# Patient Record
Sex: MALE | Race: BLACK OR AFRICAN AMERICAN | NOT HISPANIC OR LATINO | Employment: FULL TIME | URBAN - METROPOLITAN AREA
[De-identification: names, ages, dates, MRNs, and addresses within clinical notes are randomized per-mention and may not be internally consistent; named-entity substitution may affect disease eponyms.]

---

## 2017-03-03 ENCOUNTER — OFFICE VISIT (OUTPATIENT)
Dept: UROLOGY | Facility: CLINIC | Age: 57
End: 2017-03-03
Payer: COMMERCIAL

## 2017-03-03 VITALS
WEIGHT: 273.81 LBS | HEIGHT: 73 IN | BODY MASS INDEX: 36.29 KG/M2 | HEART RATE: 93 BPM | DIASTOLIC BLOOD PRESSURE: 90 MMHG | SYSTOLIC BLOOD PRESSURE: 168 MMHG

## 2017-03-03 DIAGNOSIS — B00.9 HSV-2 INFECTION: ICD-10-CM

## 2017-03-03 DIAGNOSIS — N40.1 BPH WITH URINARY OBSTRUCTION: Primary | ICD-10-CM

## 2017-03-03 DIAGNOSIS — N13.8 BPH WITH URINARY OBSTRUCTION: Primary | ICD-10-CM

## 2017-03-03 DIAGNOSIS — N48.1 BALANITIS: ICD-10-CM

## 2017-03-03 PROCEDURE — 99213 OFFICE O/P EST LOW 20 MIN: CPT | Mod: PBBFAC | Performed by: NURSE PRACTITIONER

## 2017-03-03 PROCEDURE — 99999 PR PBB SHADOW E&M-EST. PATIENT-LVL III: CPT | Mod: PBBFAC,,, | Performed by: NURSE PRACTITIONER

## 2017-03-03 PROCEDURE — 99214 OFFICE O/P EST MOD 30 MIN: CPT | Mod: S$PBB,,, | Performed by: NURSE PRACTITIONER

## 2017-03-03 RX ORDER — VALACYCLOVIR HYDROCHLORIDE 1 G/1
1000 TABLET, FILM COATED ORAL 3 TIMES DAILY
Qty: 21 TABLET | Refills: 1 | Status: SHIPPED | OUTPATIENT
Start: 2017-03-03 | End: 2017-03-03 | Stop reason: SDUPTHER

## 2017-03-03 RX ORDER — NAPROXEN 500 MG/1
TABLET ORAL
Refills: 0 | COMMUNITY
Start: 2017-02-10 | End: 2017-03-03

## 2017-03-03 RX ORDER — CLOTRIMAZOLE AND BETAMETHASONE DIPROPIONATE 10; .64 MG/G; MG/G
CREAM TOPICAL 2 TIMES DAILY
Qty: 15 G | Refills: 2 | Status: SHIPPED | OUTPATIENT
Start: 2017-03-03 | End: 2017-04-02

## 2017-03-03 RX ORDER — NAPROXEN 500 MG/1
500 TABLET ORAL 2 TIMES DAILY
COMMUNITY
End: 2018-06-05 | Stop reason: ALTCHOICE

## 2017-03-03 RX ORDER — TAMSULOSIN HYDROCHLORIDE 0.4 MG/1
0.4 CAPSULE ORAL NIGHTLY
Qty: 30 CAPSULE | Refills: 12 | Status: SHIPPED | OUTPATIENT
Start: 2017-03-03 | End: 2018-06-05 | Stop reason: SDUPTHER

## 2017-03-03 RX ORDER — VALACYCLOVIR HYDROCHLORIDE 1 G/1
1000 TABLET, FILM COATED ORAL 3 TIMES DAILY
Qty: 21 TABLET | Refills: 1
Start: 2017-03-03 | End: 2018-06-05 | Stop reason: SDUPTHER

## 2017-03-03 NOTE — PATIENT INSTRUCTIONS
Herpes  If you have herpes, youre not alone. Millions of Americans have it. Herpes has no cure. But you can control it and learn how to protect yourself and others from outbreaks.  What is herpes?  Herpes is a chronic (lifelong) virus. It can cause sores and discomfort. You get it from contact with someone who carries the virus. If sores occur on the lips, you have oral herpes. If sores occur on the penis or around the vagina, you have genital herpes.  Herpes outbreaks  · The first outbreak of herpes sores is usually the most severe. Then, the soldiers of the bodys immune system, white blood cells, produce antibodies. These antibodies help neutralize the herpes virus and may help make future attacks less severe.  · Some people have only one outbreak of sores. Some people have periods of frequent outbreaks (every few weeks). Outbreaks of herpes sores may occur less frequently over time.  · Herpes sores may appear without a cause. Outbreaks are more likely when the immune system is weak. Other viral infections (such as a cold) can cause outbreaks. Stress from a poor diet, fatigue, or emotional upset can lead to outbreaks of sores.  To help prevent outbreaks  · The best way to prevent sores is to boost your immune system. To do this:  ¨ Get plenty of sleep.  ¨ Reduce stress and tension.  ¨ Eat a balanced diet. Your health care provider may suggest taking supplements to help assure that you get all the nutrients you need.  · To prevent oral herpes outbreaks, avoid overexposure to wind, sun, and extreme temperatures. Use sunscreen and lip balm on affected areas.  · Ask your health care provider about medications that can help prevent outbreaks.  How herpes spreads to others  Herpes can be spread during an outbreak. But even without sores present, you can still shed the virus and infect others. You can take steps to prevent this.  To protect yourself and others  · If you have an oral sore, avoid kissing and  oral-genital contact.  · If you have a genital sore, avoid intercourse. Also avoid oral-genital contact.  · Wash your hands after touching a sore.  · Use a condom each time you have sex. You can pass the virus even when sores arent present. If youre unsure about the timing of certain kinds of physical contact, ask your health care provider.  · Tell any new partners that you have herpes.  · If youre a woman, have Pap tests as often as your healthcare provider recommends.   · In some cases, daily antiviral medication (valavyclovir), in addition to consistent condom use, may reduce your chances of spreading herpes to an uninfected partner. Ask your doctor if this medication would be helpful for you.  Resources  American Social Health Association STD Hotline  546.921.2368  www.ashastd.org  Centers for Disease Control and Prevention  852.132.3624  www.cdc.gov/std   Date Last Reviewed: 10/27/2014  © 6209-0732 [employer]. [address] [place] [address], [place] [address]. All rights reserved. This information is not intended as a substitute for professional medical care. Always follow your healthcare professional's instructions.        Living with Herpes  To speed healing, take care of open herpes sores. To reduce outbreaks, take care of your health. And to keep from infecting others, learn how to avoid spreading the virus.     To ease symptoms  · Start episodic treatment at the first sign of symptoms, such as itching or tingling.  · Take ibuprofen or acetaminophen to limit any pain.  · Sit in a warm or cool bath or use a moist compress to lessen the itching of sores. For some women, genital outbreaks cause burning during urination. In such cases, urinating in a tub of warm water helps reduce burning.  · Wear white cotton underwear and loose clothing during outbreaks. Dont wear nylon underwear or tight clothes. They can prevent sores from healing.     To speed healing  · Wash sores with mild soap and water. Pat  the sores completely dry.  · Always wash your hands after touching a sore.  · Dont bandage sores. Air helps them heal.  · Avoid using any ointment unless it is prescribed. Applying the wrong jelly or cream may hold in moisture and slow healing.  · Dont pick at the sores. This can slow healing, and might cause a sore to become infected.  · If you wear contacts, wash your hands well before putting them in.     To reduce outbreaks  · Eat a balanced diet. Your health care provider may suggest taking supplements. These help ensure that you get all the nutrients you need.  · Get plenty of sleep. This helps your immune system work its best.  · Limit stress and tension. Both can weaken the bodys defenses.  · Limit exposure to sun, wind, and extreme heat or cold. Wear sunscreen and lip balm to help prevent outbreaks.     To protect others  · Tell your current sex partner and any future partners that you have herpes. If you dont know what to say, ask your health care provider for help.  · Use a latex condom that covers the affected areas each time you have sex. This reduces the risk of passing herpes to your partner.  · Avoid kissing when you have an oral sore.  · Do not have intercourse when genital sores are present. Also keep in mind, herpes can be passed during oral sex and with anal contact.  · Dont share towels, toothbrushes, lip balm, or lipstick when you have a sore.  · Some evidence supports taking daily antiviral medications to help reduce the likelihood of transmission to your partner.  Date Last Reviewed: 10/28/2014  © 7501-9076 [employer]. [address] [place] [address], [place] [address]. All rights reserved. This information is not intended as a substitute for professional medical care. Always follow your healthcare professional's instructions.

## 2017-03-03 NOTE — PROGRESS NOTES
Subjective:       Patient ID: Bhupinder Garces is a 56 y.o. male.    Chief Complaint: Benign Prostatic Hypertrophy (follow up per patient -no longer on flomax )    HPI Comments: Bhupinder Garces is a 54 y.o. Male with history of BPH.  He was last seen in clinic 07/15/2015  He has history of back pain, prostatitis, & hematospermia.    Today he is here for annual check.  He no longer taking Flomax.  LUTS include frequency/nocturia 3-5x.    He not having issues with ED, but does complain of itching around head of penis.  He reports history of HSV and believes having a break out.  No lesions, burning; only itching.  urinary frequency/nocturia.                                 PSA                     0.62                02/05/2015                        PSA                      0.40                03/22/2011               Past Medical History:    Prostatitis                                                   No past surgical history on file.    No family history on file.      Social History    Marital Status:              Spouse Name:                       Years of Education:                 Number of children:               Occupational History    None on file    Social History Main Topics    Smoking Status: Never Smoker                      Smokeless Status: Not on file                       Alcohol Use: No              Drug Use: Not on file     Sexual Activity: Not on file          Other Topics            Concern    None on file    Social History Narrative    None on file        Allergies:  Review of patient's allergies indicates no known allergies.    Medications:  Current outpatient prescriptions: oxaprozin (DAYPRO) 600 mg tablet, Take 1 tablet (600 mg total) by mouth every 12 (twelve) hours. Take with food., Disp: 60 tablet, Rfl: 1;  tamsulosin (FLOMAX) 0.4 mg Cp24, TAKE 1 CAPSULE BY MOUTH EVERY DAY, Disp: 90 capsule, Rfl: 11                Review of Systems   Constitutional: Negative.  Negative for activity change,  appetite change and fever.   HENT: Negative.  Negative for facial swelling and trouble swallowing.    Eyes: Negative.    Respiratory: Negative.  Negative for shortness of breath.    Cardiovascular: Negative.  Negative for chest pain and palpitations.   Gastrointestinal: Negative for abdominal pain, constipation, diarrhea, nausea and vomiting.   Genitourinary: Positive for frequency and nocturia. Negative for difficulty urinating, dysuria, enuresis, flank pain, genital sores, hematuria, penile pain, scrotal swelling, testicular pain and urgency.        FOS good;  (+) penis itching.     Musculoskeletal: Negative for back pain, gait problem and neck stiffness.   Skin: Negative.  Negative for wound.   Neurological: Negative for dizziness, tremors, seizures, syncope, speech difficulty, light-headedness and headaches.   Hematological: Does not bruise/bleed easily.   Psychiatric/Behavioral: Negative for confusion and hallucinations. The patient is not nervous/anxious.        Objective:      Physical Exam   Nursing note and vitals reviewed.  Constitutional: He is oriented to person, place, and time. Vital signs are normal. He appears well-developed and well-nourished. He is active and cooperative.  Non-toxic appearance. He does not have a sickly appearance.   Urine dipped clear of infection in the office today.     HENT:   Head: Normocephalic and atraumatic.   Right Ear: External ear normal.   Left Ear: External ear normal.   Nose: Nose normal.   Mouth/Throat: Mucous membranes are normal.   Eyes: Conjunctivae and lids are normal. No scleral icterus.   Neck: Trachea normal, normal range of motion and full passive range of motion without pain. Neck supple. No JVD present. No tracheal deviation present.   Cardiovascular: Normal rate, regular rhythm, S1 normal and S2 normal.    Pulmonary/Chest: Effort normal and breath sounds normal. No respiratory distress. He exhibits no tenderness.   Abdominal: Soft. Normal appearance and  bowel sounds are normal. There is no hepatosplenomegaly. There is no tenderness. There is no rebound, no guarding and no CVA tenderness.   Genitourinary: Rectum normal and testes normal. Rectal exam shows no external hemorrhoid, no mass and no tenderness. Prostate is enlarged. Right testis shows no mass, no swelling and no tenderness. Left testis shows no mass, no swelling and no tenderness. Penile erythema present. No phimosis, paraphimosis, hypospadias or penile tenderness. No discharge found.       Genitourinary Comments: (+) erythema around corona; no lesions/ulcerations noted. hypopigmented areas around distal penis     Musculoskeletal: Normal range of motion.   Neurological: He is alert and oriented to person, place, and time. He has normal strength.   Skin: Skin is warm, dry and intact.     Psychiatric: He has a normal mood and affect. His behavior is normal. Judgment and thought content normal.       Assessment:       1. BPH with urinary obstruction    2. Balanitis    3. HSV-2 infection        Plan:         I spent 25 minutes with the patient of which more than half was spent in direct consultation with the patient in regards to our treatment and plan.    Education and recommendations of today's plan of care including home remedies.  Discussed his LUTS; diet modifications; avoiding bladder irritants.  Restart Flomax; benefits, risks, se discussed  Discussed HSV and typical symptoms with outbreak.  This appears to be more balanitis; trial of Lotrisone cream BID x 7 days.  Rx to hold for Valtrex 1000mg TID x 7days for typical outbreak.  Educational material given.  PSA today.  RTC 3 months to check LUTS

## 2017-03-03 NOTE — MR AVS SNAPSHOT
[facility] - Urology 4th Floor  [address]  [facility] [address]  Phone: [phone]                  Bhupinder Garces   3/3/2017 2:40 PM   Office Visit    Description:  Male : 1960   Provider:  Poly King NP   Department:  [facility] - Urology 4th Floor           Reason for Visit     Benign Prostatic Hypertrophy           Diagnoses this Visit        Comments    BPH with urinary obstruction    -  Primary     Balanitis         HSV-1 infection                To Do List           Goals (5 Years of Data)     None      Follow-Up and Disposition     Return in about 3 months (around 6/3/2017).       These Medications        Disp Refills Start End    tamsulosin (FLOMAX) 0.4 mg Cp24 30 capsule 12 3/3/2017 2017    Take 1 capsule (0.4 mg total) by mouth every evening. - Oral    Pharmacy: [address] [place] [address] [address] [facility] Ph #: [phone]       clotrimazole-betamethasone 1-0.05% (LOTRISONE) cream 15 g 2 3/3/2017 2017    Apply topically 2 (two) times daily. Apply very thin layer to clean affected area twice a day for 7 - 10 days. - Topical (Top)    Pharmacy: [address] [place] [address], [place] - [address] [facility] Ph #: [phone]       valacyclovir (VALTREX) 1000 MG tablet 21 tablet 1 3/3/2017 3/10/2017    Take 1 tablet (1,000 mg total) by mouth 3 (three) times daily. - Oral    Pharmacy: [address] [place] [address] [place] [facility] [address] [facility]: [phone]         [facility]     [facility] On Call Nurse Care Line -  Assistance  Registered nurses in the [facility] provide clinical advisement, health education, appointment booking, and other advisory services.  Call for this free service at [phone].             Medications           Message regarding Medications     Verify the changes and/or additions to your medication  "regime listed below are the same as discussed with your clinician today.  If any of these changes or additions are incorrect, please notify your healthcare provider.        START taking these NEW medications        Refills    tamsulosin (FLOMAX) 0.4 mg Cp24 12    Sig: Take 1 capsule (0.4 mg total) by mouth every evening.    Class: Normal    Route: Oral    clotrimazole-betamethasone 1-0.05% (LOTRISONE) cream 2    Sig: Apply topically 2 (two) times daily. Apply very thin layer to clean affected area twice a day for 7 - 10 days.    Class: Normal    Route: Topical (Top)    valacyclovir (VALTREX) 1000 MG tablet 1    Sig: Take 1 tablet (1,000 mg total) by mouth 3 (three) times daily.    Class: Print    Route: Oral           Verify that the below list of medications is an accurate representation of the medications you are currently taking.  If none reported, the list may be blank. If incorrect, please contact your healthcare provider. Carry this list with you in case of emergency.           Current Medications     naproxen (NAPROSYN) 500 MG tablet Take 500 mg by mouth 2 (two) times daily.    clotrimazole-betamethasone 1-0.05% (LOTRISONE) cream Apply topically 2 (two) times daily. Apply very thin layer to clean affected area twice a day for 7 - 10 days.    tamsulosin (FLOMAX) 0.4 mg Cp24 Take 1 capsule (0.4 mg total) by mouth every evening.    valacyclovir (VALTREX) 1000 MG tablet Take 1 tablet (1,000 mg total) by mouth 3 (three) times daily.           Clinical Reference Information           Your Vitals Were     BP Pulse Height Weight BMI    168/90 93 6' 1" (1.854 m) 124.2 kg (273 lb 13 oz) 36.13 kg/m2      Blood Pressure          Most Recent Value    BP  (!)  168/90      Allergies as of 3/3/2017     No Known Allergies      Immunizations Administered on Date of Encounter - 3/3/2017     None      Orders Placed During Today's Visit      Normal Orders This Visit    POCT urine dipstick without microscope     Future " Labs/Procedures Expected by Expires    Prostate Specific Antigen, Diagnostic  3/3/2017 4/7/2018      [facility] Sign-Up     Activating your [facility] account is as easy as 1-2-3!     1) Visit [email or web address], select Sign Up Now, enter this activation code and your date of birth, then select Next.  [email or web address]  Expires: 4/17/2017  3:48 PM      2) Create a username and password to use when you visit [facility] in the future and select a security question in case you lose your password and select Next.    3) Enter your e-mail address and click Sign Up!    Additional Information  If you have questions, please e-mail [email or web address] or call [phone] to talk to our [facility] staff. Remember, [facility] is NOT to be used for urgent needs. For medical emergencies, dial 911.         Instructions      Herpes  If you have herpes, youre not alone. Millions of Americans have it. Herpes has no cure. But you can control it and learn how to protect yourself and others from outbreaks.  What is herpes?  Herpes is a chronic (lifelong) virus. It can cause sores and discomfort. You get it from contact with someone who carries the virus. If sores occur on the lips, you have oral herpes. If sores occur on the penis or around the vagina, you have genital herpes.  Herpes outbreaks  · The first outbreak of herpes sores is usually the most severe. Then, the soldiers of the bodys immune system, white blood cells, produce antibodies. These antibodies help neutralize the herpes virus and may help make future attacks less severe.  · Some people have only one outbreak of sores. Some people have periods of frequent outbreaks (every few weeks). Outbreaks of herpes sores may occur less frequently over time.  · Herpes sores may appear without a cause. Outbreaks are more likely when the immune system is weak. Other viral infections (such as a cold) can cause outbreaks. Stress from a poor diet, fatigue, or emotional upset can lead to  outbreaks of sores.  To help prevent outbreaks  · The best way to prevent sores is to boost your immune system. To do this:  ¨ Get plenty of sleep.  ¨ Reduce stress and tension.  ¨ Eat a balanced diet. Your health care provider may suggest taking supplements to help assure that you get all the nutrients you need.  · To prevent oral herpes outbreaks, avoid overexposure to wind, sun, and extreme temperatures. Use sunscreen and lip balm on affected areas.  · Ask your health care provider about medications that can help prevent outbreaks.  How herpes spreads to others  Herpes can be spread during an outbreak. But even without sores present, you can still shed the virus and infect others. You can take steps to prevent this.  To protect yourself and others  · If you have an oral sore, avoid kissing and oral-genital contact.  · If you have a genital sore, avoid intercourse. Also avoid oral-genital contact.  · Wash your hands after touching a sore.  · Use a condom each time you have sex. You can pass the virus even when sores arent present. If youre unsure about the timing of certain kinds of physical contact, ask your health care provider.  · Tell any new partners that you have herpes.  · If youre a woman, have Pap tests as often as your healthcare provider recommends.   · In some cases, daily antiviral medication (valavyclovir), in addition to consistent condom use, may reduce your chances of spreading herpes to an uninfected partner. Ask your doctor if this medication would be helpful for you.  Resources  American Social Health Association STD Hotline  148.775.2974  www.ashastd.org  Centers for Disease Control and Prevention  723.501.4024  www.cdc.gov/std   Date Last Reviewed: 10/27/2014  © 7737-8191 [employer]. [address] [place] [address], [place] [address]. All rights reserved. This information is not intended as a substitute for professional medical care. Always follow your healthcare professional's  instructions.        Living with Herpes  To speed healing, take care of open herpes sores. To reduce outbreaks, take care of your health. And to keep from infecting others, learn how to avoid spreading the virus.     To ease symptoms  · Start episodic treatment at the first sign of symptoms, such as itching or tingling.  · Take ibuprofen or acetaminophen to limit any pain.  · Sit in a warm or cool bath or use a moist compress to lessen the itching of sores. For some women, genital outbreaks cause burning during urination. In such cases, urinating in a tub of warm water helps reduce burning.  · Wear white cotton underwear and loose clothing during outbreaks. Dont wear nylon underwear or tight clothes. They can prevent sores from healing.     To speed healing  · Wash sores with mild soap and water. Pat the sores completely dry.  · Always wash your hands after touching a sore.  · Dont bandage sores. Air helps them heal.  · Avoid using any ointment unless it is prescribed. Applying the wrong jelly or cream may hold in moisture and slow healing.  · Dont pick at the sores. This can slow healing, and might cause a sore to become infected.  · If you wear contacts, wash your hands well before putting them in.     To reduce outbreaks  · Eat a balanced diet. Your health care provider may suggest taking supplements. These help ensure that you get all the nutrients you need.  · Get plenty of sleep. This helps your immune system work its best.  · Limit stress and tension. Both can weaken the bodys defenses.  · Limit exposure to sun, wind, and extreme heat or cold. Wear sunscreen and lip balm to help prevent outbreaks.     To protect others  · Tell your current sex partner and any future partners that you have herpes. If you dont know what to say, ask your health care provider for help.  · Use a latex condom that covers the affected areas each time you have sex. This reduces the risk of passing herpes to your  partner.  · Avoid kissing when you have an oral sore.  · Do not have intercourse when genital sores are present. Also keep in mind, herpes can be passed during oral sex and with anal contact.  · Dont share towels, toothbrushes, lip balm, or lipstick when you have a sore.  · Some evidence supports taking daily antiviral medications to help reduce the likelihood of transmission to your partner.  Date Last Reviewed: 10/28/2014  © 3145-6993 [employer]. [address] [place] [address], [place] [address]. All rights reserved. This information is not intended as a substitute for professional medical care. Always follow your healthcare professional's instructions.             Language Assistance Services     ATTENTION: Language assistance services are available, free of charge. Please call [phone].      ATENCIÓN: Si marthala hossein, tiene a moreland disposición servicios gratuitos de asistencia lingüística. Llame al [phone].     CHÚ Ý: N?u b?n nói Ti?ng Vi?t, có các d?ch v? h? tr? ngôn ng? mi?n phí dành cho b?n. G?i s? [phone].         [facility] 4th [facility] complies with applicable Federal civil rights laws and does not discriminate on the basis of race, color, national origin, age, disability, or sex.

## 2018-06-05 ENCOUNTER — LAB VISIT (OUTPATIENT)
Dept: LAB | Facility: HOSPITAL | Age: 58
End: 2018-06-05

## 2018-06-05 ENCOUNTER — OFFICE VISIT (OUTPATIENT)
Dept: UROLOGY | Facility: CLINIC | Age: 58
End: 2018-06-05
Payer: COMMERCIAL

## 2018-06-05 VITALS
SYSTOLIC BLOOD PRESSURE: 166 MMHG | BODY MASS INDEX: 34.71 KG/M2 | DIASTOLIC BLOOD PRESSURE: 87 MMHG | HEART RATE: 94 BPM | WEIGHT: 261.94 LBS | HEIGHT: 73 IN

## 2018-06-05 DIAGNOSIS — B00.9 HSV-2 INFECTION: ICD-10-CM

## 2018-06-05 DIAGNOSIS — B00.1 RECURRENT COLD SORES: ICD-10-CM

## 2018-06-05 DIAGNOSIS — N40.1 BPH WITH URINARY OBSTRUCTION: ICD-10-CM

## 2018-06-05 DIAGNOSIS — L29.3 ITCHING OF PENIS: ICD-10-CM

## 2018-06-05 DIAGNOSIS — N13.8 BPH WITH URINARY OBSTRUCTION: Primary | ICD-10-CM

## 2018-06-05 DIAGNOSIS — N13.8 BPH WITH URINARY OBSTRUCTION: ICD-10-CM

## 2018-06-05 DIAGNOSIS — N40.1 BPH WITH URINARY OBSTRUCTION: Primary | ICD-10-CM

## 2018-06-05 LAB
ALBUMIN SERPL BCP-MCNC: 3.9 G/DL
ALP SERPL-CCNC: 66 U/L
ALT SERPL W/O P-5'-P-CCNC: 24 U/L
AST SERPL-CCNC: 36 U/L
BASOPHILS # BLD AUTO: 0.03 K/UL
BASOPHILS NFR BLD: 0.5 %
BILIRUB DIRECT SERPL-MCNC: 0.2 MG/DL
BILIRUB SERPL-MCNC: 0.6 MG/DL
COMPLEXED PSA SERPL-MCNC: 0.72 NG/ML
CREAT SERPL-MCNC: 1.4 MG/DL
DIFFERENTIAL METHOD: ABNORMAL
EOSINOPHIL # BLD AUTO: 0.1 K/UL
EOSINOPHIL NFR BLD: 1.3 %
ERYTHROCYTE [DISTWIDTH] IN BLOOD BY AUTOMATED COUNT: 12.8 %
EST. GFR  (AFRICAN AMERICAN): >60 ML/MIN/1.73 M^2
EST. GFR  (NON AFRICAN AMERICAN): 55 ML/MIN/1.73 M^2
HCT VFR BLD AUTO: 40.1 %
HGB BLD-MCNC: 13.1 G/DL
IMM GRANULOCYTES # BLD AUTO: 0.02 K/UL
IMM GRANULOCYTES NFR BLD AUTO: 0.3 %
LYMPHOCYTES # BLD AUTO: 2.4 K/UL
LYMPHOCYTES NFR BLD: 38.6 %
MCH RBC QN AUTO: 28.6 PG
MCHC RBC AUTO-ENTMCNC: 32.7 G/DL
MCV RBC AUTO: 88 FL
MONOCYTES # BLD AUTO: 0.5 K/UL
MONOCYTES NFR BLD: 8.1 %
NEUTROPHILS # BLD AUTO: 3.2 K/UL
NEUTROPHILS NFR BLD: 51.2 %
NRBC BLD-RTO: 0 /100 WBC
PLATELET # BLD AUTO: 278 K/UL
PMV BLD AUTO: 9.8 FL
PROT SERPL-MCNC: 7.5 G/DL
RBC # BLD AUTO: 4.58 M/UL
WBC # BLD AUTO: 6.16 K/UL

## 2018-06-05 PROCEDURE — 3008F BODY MASS INDEX DOCD: CPT | Mod: CPTII,S$GLB,, | Performed by: NURSE PRACTITIONER

## 2018-06-05 PROCEDURE — 84153 ASSAY OF PSA TOTAL: CPT

## 2018-06-05 PROCEDURE — 86696 HERPES SIMPLEX TYPE 2 TEST: CPT

## 2018-06-05 PROCEDURE — 99214 OFFICE O/P EST MOD 30 MIN: CPT | Mod: S$GLB,,, | Performed by: NURSE PRACTITIONER

## 2018-06-05 PROCEDURE — 80076 HEPATIC FUNCTION PANEL: CPT

## 2018-06-05 PROCEDURE — 85025 COMPLETE CBC W/AUTO DIFF WBC: CPT

## 2018-06-05 PROCEDURE — 82565 ASSAY OF CREATININE: CPT

## 2018-06-05 PROCEDURE — 36415 COLL VENOUS BLD VENIPUNCTURE: CPT

## 2018-06-05 PROCEDURE — 99999 PR PBB SHADOW E&M-EST. PATIENT-LVL III: CPT | Mod: PBBFAC,,, | Performed by: NURSE PRACTITIONER

## 2018-06-05 RX ORDER — TAMSULOSIN HYDROCHLORIDE 0.4 MG/1
0.4 CAPSULE ORAL NIGHTLY
Qty: 90 CAPSULE | Refills: 3 | Status: SHIPPED | OUTPATIENT
Start: 2018-06-05 | End: 2020-01-08 | Stop reason: SDUPTHER

## 2018-06-05 RX ORDER — VALACYCLOVIR HYDROCHLORIDE 500 MG/1
500 TABLET, FILM COATED ORAL DAILY
Qty: 90 TABLET | Refills: 3 | Status: SHIPPED | OUTPATIENT
Start: 2018-06-12 | End: 2020-01-08 | Stop reason: SDUPTHER

## 2018-06-05 RX ORDER — CLOTRIMAZOLE AND BETAMETHASONE DIPROPIONATE 10; .64 MG/G; MG/G
CREAM TOPICAL 2 TIMES DAILY
Qty: 15 G | Refills: 2 | Status: SHIPPED | OUTPATIENT
Start: 2018-06-05 | End: 2019-11-25 | Stop reason: SDUPTHER

## 2018-06-05 RX ORDER — VALACYCLOVIR HYDROCHLORIDE 1 G/1
1000 TABLET, FILM COATED ORAL 3 TIMES DAILY
Qty: 21 TABLET | Refills: 1 | Status: SHIPPED | OUTPATIENT
Start: 2018-06-05 | End: 2018-06-12

## 2018-06-05 NOTE — PATIENT INSTRUCTIONS
BPH (Enlarged Prostate)  The prostate is a gland at the base of the bladder. As some men get older, the prostate may get bigger in size. This problem is called benign prostatic hyperplasia (BPH). BPH puts pressure on the urethra. This is the tube that carries urine from the bladder to the penis. It may interfere with the flow of urine. It may also keep the bladder from emptying fully.    Symptoms of BPH include trouble starting urination and feeling as though the bladder isnt emptying all the way. It also includes a weak urine stream, dribbling and leaking of urine, and frequent and urgent urination (especially at night). BPH can increase the risk of urinary infections. It can also block off urine flow completely. If this occurs, a thin tube (catheter) may be passed into the bladder to help drain urine.  If symptoms are mild, no treatment may be needed right now. If symptoms are more severe, treatment is likely needed. The goal of treatment is to improve urine flow and reduce symptoms. Treatments can include medicine and procedures. Your healthcare provider will discuss treatment options with you as needed.  Home care  The following guidelines will help you care for yourself at home:  · Urinate as soon as you feel the urge. Don't try to hold your urine.  · Don't limit your fluid intake during the day. Drink 6 to 8 glasses of water or liquids a day. This prevents bacteria from building up in the bladder.  · Avoid drinking fluids after dinner to help reduce urination during the night.  · Avoid medicines that can worsen your symptoms. These include certain cold and allergy medicines and antidepressants. Diuretics used for high blood pressure can also worsen symptoms. Talk to your doctor about the medicines you take. Other choices may work better for you.  Prostate cancer screening  BPH does not increase the risk of prostate cancer. But because prostate cancer is a common cancer in men, screening is sometimes  recommended. This may help detect the cancer in its early stages when treatment is most effective. Factors that can increase the risk of prostate cancer include being -American or having a father or brother who had prostate cancer. A high-fat diet may also increase the risk of prostate cancer. Talk to your healthcare provider to see whether you should be screened for prostate cancer.  Follow-up care  Follow up with your healthcare provider, or as advised  To learn more, go to:  · National Kidney & Urologic Diseases Information Clearinghouse  kidney.niddk.nih.gov, 645.537.6889  When to seek medical advice  Call your healthcare provider right away if any of these occur:  · Fever of 100.4°F (38.0°C) or higher, or as advised  · Unable to pass urine for 8 hours  · Increasing pressure or pain in your bladder (lower abdomen)  · Blood in the urine  · Increasing low back pain, not related to injury  · Symptoms of urinary infection (increased urge to urinate, burning when passing urine, foul-smelling urine)  Date Last Reviewed: 7/1/2016  © 2886-3680 [employer]. [address] [place] [address]. All rights reserved. This information is not intended as a substitute for professional medical care. Always follow your healthcare professional's instructions.        Understanding Cold Sores  Cold sores are small blisters or sores on the lip or sometimes inside the mouth. Many people get them from time to time. Cold sores usually are not serious, and they usually heal in a week or two. They are caused by 2 related viruses, herpes simplex type 1 and 2. These viruses spread very easily. Many people have one or both of these viruses in their body. More than 4 in every 5 people are infected with herpes simplex type 1. Once you have the virus that causes cold sores, it stays in your body for the rest of your life. But it can be inactive for long periods.  What causes a cold sore?  Cold sores are usually  caused by herpes simplex virus type 1. Less often, they are caused by herpes simplex virus type 2. Herpes simplex virus type 2 is the more common cause of genital sores. The herpes viruses can enter the body through a break in the skin such as a scrape. Or they may enter through mucous membranes such as the lips or mouth. Some ways to get the viruses include:  · Kissing someone who has a cold sore  · Sharing a drinking glass, eating utensils, or lip balm with someone who has a cold sore  · Having oral sex with someone who has a cold sore  A  baby can also get the infection at birth.  If you have a herpes virus, you can to pass it along even when you dont have a sore.  Cold sores flare up occasionally. Things that can cause an outbreak include:  · Sun exposure  · Fever  · Stress or exhaustion  · Menstruation  · Skin irritation  · Another unrelated Illness such as pneumonia, urinary infection, or cancer  What are the symptoms of a cold sore?  Symptoms can include:  · A blister-like sore or cluster of sores. These often occur at the edge of the lips but may appear inside the mouth.  · Skin redness around the sores.  · Pain or itching in the area of the outbreak. Often the pain or itching develops 12 to 24 hours before the sore become visible.  · Flu-like symptoms, including swollen glands, headache, body ache, or fever. These typically occur only at the time of the first infection.  Cold sores may also occur on fingers. They may rarely infect the eyes, a serious possible complication.  Some people have symptoms a day or two before an outbreak. They may feel tenderness, burning, itching, or tingling before a cold sore appears. Cold sores tend to come back in the same area that they first appeared.  How are cold sores treated?  Treatment for cold sores focuses on relieving and shortening symptoms. For people with frequent outbreaks, treatment works to decrease how often future episodes.  Treatments may  include:  · Prescription or over-the-counter pain medicines. These can help with discomfort, especially if sores are inside the mouth.  · Antiviral medicines. These may be pills that are taken by mouth or a cream to apply to sores. They may help shorten an outbreak and reduce the severity of symptoms.  They may be used to help prevent future outbreaks if you have disabling recurrent infections.  · Self-care such as extra rest and drinking more fluids. These may help relieve the flu-like symptoms of a first outbreak.  How are cold sores diagnosed?  Your healthcare provider makes the diagnosis mainly by looking at the sores and doing a clinical exam.  This may be confirmed by swab tests or blood tests.  How can I prevent cold sores?  You can help reduce the spread of the herpes viruses that cause cold sores. This can help both you and others avoid getting cold sores. Follow these tips:  · Do not kiss others if you have a cold sore. Also avoid kissing someone with a cold sore.  · Do not share eating utensils, lip balm, razors, or towels with someone who has a cold sore.  · Wash your hands after touching the area of a cold sore. The herpes virus can be carried from your face to your hands when you touch the area of a cold sore. When this happens, wash your hands thoroughly, for at least 20 seconds. When you cant wash with soap and water, use an alcohol-based hand .  · Disinfect things you touch often, such as phones and keyboards.    · If you feel a cold sore coming on, do the same things you would do when a cold sore is present to avoid spreading the virus.  · Use condoms to help prevent passing on the viruses through sex.  What are the possible complications of a cold sore?  Cold sores usually go away by themselves within 2 weeks. For most people cold sores are not serious. The viruses that cause cold sores can cause more serious illness, though. People who have a weak immune system may get more serious  infections from herpes viruses. These include people being treated for cancer or who have HIV disease. Babies may also become very ill from a herpes infection.      When should I call my healthcare provider?  Call your healthcare provider right away if you have any of these:  · Fever of 100.4°F (38°C) or higher, or as directed  · Pain that gets worse  · You cannot eat or drink because of painful sores  · Symptoms dont get better within 5 to 7 days  · Blisters spread beyond the mouth or lip to areas on the chest, arms, face, or legs   Date Last Reviewed: 3/28/2016  © 9727-7798 [employer]. [address] [place] [id number]. All rights reserved. This information is not intended as a substitute for professional medical care. Always follow your healthcare professional's instructions.        The Herpes Virus  Herpes is a virus that can cause sores on the skin. There are 2 types of the virus. Depending on how you come in contact with the virus, either type can cause outbreaks near the mouth or on the sex organs.  Understanding the herpes virus  Herpes reproduces only when it is inside the body. It does so by tricking a healthy cell into producing copies of the herpes virus. Each copy can infect nearby cells. But, before too long, the bodys defenses rally to stop the attack. The immune system forces the virus to retreat. Even then, the virus stays inside the body but does not cause disease. For some people, an acute outbreak never happens again. For others, outbreaks are more likely to occur due to menstruation, illness, poor diet, fatigue, exposure to cold or strong sunlight, or stress.    How the herpes virus attacks  1. The herpes virus enters the body through a small break in the skin. The virus can also enter by direct contact with mucous membranes, such as those of the lips, vagina, or anus.  2. Inside the body, the herpes virus binds to a special site on a skin cell. Then part of the virus  moves into the cell.  3. Inside the skin cell, the virus releases a set of instructions. These commands cause the cell to begin making copies of the herpes virus.  4. Herpes blisters appear on the skin. Herpes blisters may also appear on mucous membranes lining the mouth, vagina, or anus.  Date Last Reviewed: 1/1/2017  © 6530-2394 [employer]. [address] [place] [address], [place] [address]. All rights reserved. This information is not intended as a substitute for professional medical care. Always follow your healthcare professional's instructions.        Living with Herpes  To speed healing, take care of open herpes sores. To reduce outbreaks, take care of your health. And to keep from infecting others, learn how to avoid spreading the virus.     To ease symptoms  · Start episodic treatment at the first sign of symptoms, such as itching or tingling.  · Take ibuprofen or acetaminophen to limit any pain.  · Sit in a warm or cool bath or use a moist compress to lessen the itching of sores. For some women, genital outbreaks cause burning during urination. In such cases, urinating in a tub of warm water helps reduce burning.  · Wear white cotton underwear and loose clothing during outbreaks. Dont wear nylon underwear or tight clothes. They can prevent sores from healing.     To speed healing  · Wash sores with mild soap and water. Pat (don't rub) the sores completely dry.  · Always wash your hands after touching a sore.  · Dont bandage sores. Air helps them heal.  · Avoid using any ointment unless it is prescribed. Applying the wrong jelly or cream may hold in moisture and slow healing.  · Dont pick at the sores. This can slow healing, and might cause a sore to become infected.  · If you wear contacts, wash your hands well before putting them in.     To reduce outbreaks  · Eat a balanced diet. Your health care provider may suggest taking supplements. These help ensure that you get all the nutrients you  need.  · Get plenty of sleep. This helps your immune system work its best.  · Limit stress and tension. Both can weaken the bodys defenses.  · Limit exposure to sun, wind, and extreme heat or cold. Wear sunscreen and lip balm to help prevent outbreaks.     To protect others  · Tell your current sex partner and any future partners that you have herpes. If you dont know what to say, ask your healthcare provider for help.  · Use a latex condom that covers the affected areas each time you have sex. This reduces the risk of passing herpes to your partner.  · Avoid kissing when you have an oral sore.  · Do not have intercourse when genital sores are present. Also keep in mind, herpes can be passed during oral sex and with anal contact.  · Dont share towels, toothbrushes, lip balm, or lipstick when you have a sore.  · If you have very frequent outbreaks, taking daily antiviral medicines can help reduce the likelihood of transmission to your partner.  Date Last Reviewed: 1/1/2017 © 2000-2017 [employer]. [address] [place] [address]. All rights reserved. This information is not intended as a substitute for professional medical care. Always follow your healthcare professional's instructions.        Living with Herpes  To speed healing, take care of open herpes sores. To reduce outbreaks, take care of your health. And to keep from infecting others, learn how to avoid spreading the virus.     To ease symptoms  · Start episodic treatment at the first sign of symptoms, such as itching or tingling.  · Take ibuprofen or acetaminophen to limit any pain.  · Sit in a warm or cool bath or use a moist compress to lessen the itching of sores. For some women, genital outbreaks cause burning during urination. In such cases, urinating in a tub of warm water helps reduce burning.  · Wear white cotton underwear and loose clothing during outbreaks. Dont wear nylon underwear or tight clothes. They can prevent  sores from healing.     To speed healing  · Wash sores with mild soap and water. Pat (don't rub) the sores completely dry.  · Always wash your hands after touching a sore.  · Dont bandage sores. Air helps them heal.  · Avoid using any ointment unless it is prescribed. Applying the wrong jelly or cream may hold in moisture and slow healing.  · Dont pick at the sores. This can slow healing, and might cause a sore to become infected.  · If you wear contacts, wash your hands well before putting them in.     To reduce outbreaks  · Eat a balanced diet. Your health care provider may suggest taking supplements. These help ensure that you get all the nutrients you need.  · Get plenty of sleep. This helps your immune system work its best.  · Limit stress and tension. Both can weaken the bodys defenses.  · Limit exposure to sun, wind, and extreme heat or cold. Wear sunscreen and lip balm to help prevent outbreaks.     To protect others  · Tell your current sex partner and any future partners that you have herpes. If you dont know what to say, ask your healthcare provider for help.  · Use a latex condom that covers the affected areas each time you have sex. This reduces the risk of passing herpes to your partner.  · Avoid kissing when you have an oral sore.  · Do not have intercourse when genital sores are present. Also keep in mind, herpes can be passed during oral sex and with anal contact.  · Dont share towels, toothbrushes, lip balm, or lipstick when you have a sore.  · If you have very frequent outbreaks, taking daily antiviral medicines can help reduce the likelihood of transmission to your partner.  Date Last Reviewed: 1/1/2017  © 4302-8782 [employer]. [address] [place] [address], [place] [address]. All rights reserved. This information is not intended as a substitute for professional medical care. Always follow your healthcare professional's instructions.

## 2018-06-05 NOTE — PROGRESS NOTES
Subjective:       Patient ID: Bhupinder Garces is a 57 y.o. male.    Chief Complaint: Benign Prostatic Hypertrophy    Bhupinder Garces is a 57 y.o. Male with history of BPH.  He has history of back pain, prostatitis, & hematospermia.  He was last seen in clinic 03/03/2017  He has been taking Flomax for his LUTS.    Today he is here for annual check.  He taking Flomax;   Pleased with how he urinates;   No issues with ED.                PSA                  0.56                03/03/2017                 PSA                  0.62                02/05/2015                   PSA                  0.40                03/22/2011                 PSA                  0.4                 10/01/2007                 PSA                  0.4                 12/06/2005                 PSA                  0.3                 04/12/2004                               Past Medical History:    Prostatitis                                                   No past surgical history on file.    No family history on file.      Social History    Marital Status:              Spouse Name:                       Years of Education:                 Number of children:               Occupational History    None on file    Social History Main Topics    Smoking Status: Never Smoker                      Smokeless Status: Not on file                       Alcohol Use: No              Drug Use: Not on file     Sexual Activity: Not on file          Other Topics            Concern    None on file    Social History Narrative    None on file        Allergies:  Review of patient's allergies indicates no known allergies.    Medications:  Current outpatient prescriptions: oxaprozin (DAYPRO) 600 mg tablet, Take 1 tablet (600 mg total) by mouth every 12 (twelve) hours. Take with food., Disp: 60 tablet, Rfl: 1;  tamsulosin (FLOMAX) 0.4 mg Cp24, TAKE 1 CAPSULE BY MOUTH EVERY DAY, Disp: 90 capsule, Rfl: 11                  Review of Systems   Constitutional:  Negative for activity change, appetite change, chills and fever.   HENT: Negative for facial swelling and trouble swallowing.    Eyes: Negative for visual disturbance.   Respiratory: Negative for chest tightness and shortness of breath.    Cardiovascular: Negative for chest pain and palpitations.   Gastrointestinal: Negative.  Negative for abdominal pain, constipation, diarrhea, nausea and vomiting.   Genitourinary: Negative for difficulty urinating, dysuria, flank pain, hematuria, penile pain, penile swelling, scrotal swelling and testicular pain.        Ok with how he urinates.  No issues with ED.  Itching around head of penis     Musculoskeletal: Negative for back pain, gait problem, myalgias and neck stiffness.   Skin: Positive for rash.        Itching on penis  Penile itching.     Neurological: Negative for dizziness and speech difficulty.   Hematological: Does not bruise/bleed easily.   Psychiatric/Behavioral: Negative for behavioral problems.       Objective:      Physical Exam   Nursing note and vitals reviewed.  Constitutional: He is oriented to person, place, and time. Vital signs are normal. He appears well-developed and well-nourished. He is active and cooperative.  Non-toxic appearance. He does not have a sickly appearance.   Urine dipped clear of infection in the office today.     HENT:   Head: Normocephalic and atraumatic.   Right Ear: External ear normal.   Left Ear: External ear normal.   Nose: Nose normal.   Mouth/Throat: Mucous membranes are normal.   Eyes: Conjunctivae and lids are normal. No scleral icterus.   Neck: Trachea normal, normal range of motion and full passive range of motion without pain. Neck supple. No JVD present. No tracheal deviation present.   Cardiovascular: Normal rate, S1 normal and S2 normal.    Pulmonary/Chest: Effort normal. No respiratory distress. He exhibits no tenderness.   Abdominal: Soft. Normal appearance and bowel sounds are normal. There is no hepatosplenomegaly.  There is no tenderness. There is no CVA tenderness.   Genitourinary: Testes normal. Prostate is enlarged. Right testis shows no swelling and no tenderness. Left testis shows no swelling and no tenderness. Penile erythema and penile tenderness present. No hypospadias. No discharge found.   Genitourinary Comments: Few erythematous vessicle noted on ventral area of corona.   Hyperpigmentation noted.     Musculoskeletal: Normal range of motion.   Neurological: He is alert and oriented to person, place, and time. He has normal strength.   Skin: Skin is warm, dry and intact.     Psychiatric: He has a normal mood and affect. His behavior is normal. Judgment and thought content normal.       Assessment:       1. BPH with urinary obstruction    2. HSV-2 infection    3. Recurrent cold sores    4. Itching of penis        Plan:         I spent 25 minutes with the patient of which more than half was spent in direct consultation with the patient in regards to our treatment and plan.    Education and recommendations of today's plan of care including home remedies.  We discussed his BPH and LUTS; discussed the contributory factors.  Refilled Flomax.  We discussed HSV; recurrence; transmission and triggers.  Initially take Valtrex 1000mg TIDx 7 days then in one week Valtrex 500mg daily for prevention.  Lotrisone cream for area of itching  Today, get PSA, Cr cbc, hsv, lft  RTC one year with new labs

## 2018-06-08 LAB
HSV1 IGG SERPL QL IA: POSITIVE
HSV2 IGG SERPL QL IA: POSITIVE

## 2019-04-12 ENCOUNTER — OFFICE VISIT (OUTPATIENT)
Dept: SURGERY | Facility: CLINIC | Age: 59
End: 2019-04-12
Payer: MEDICAID

## 2019-04-12 VITALS
SYSTOLIC BLOOD PRESSURE: 146 MMHG | WEIGHT: 265.88 LBS | BODY MASS INDEX: 35.24 KG/M2 | HEIGHT: 73 IN | HEART RATE: 98 BPM | DIASTOLIC BLOOD PRESSURE: 85 MMHG

## 2019-04-12 DIAGNOSIS — Z12.11 SCREENING FOR COLON CANCER: ICD-10-CM

## 2019-04-12 DIAGNOSIS — K64.8 INTERNAL HEMORRHOID, BLEEDING: Primary | ICD-10-CM

## 2019-04-12 PROCEDURE — 99213 OFFICE O/P EST LOW 20 MIN: CPT | Mod: PBBFAC | Performed by: NURSE PRACTITIONER

## 2019-04-12 PROCEDURE — 99999 PR PBB SHADOW E&M-EST. PATIENT-LVL III: CPT | Mod: PBBFAC,,, | Performed by: NURSE PRACTITIONER

## 2019-04-12 PROCEDURE — 99999 PR PBB SHADOW E&M-EST. PATIENT-LVL III: ICD-10-PCS | Mod: PBBFAC,,, | Performed by: NURSE PRACTITIONER

## 2019-04-12 PROCEDURE — 99204 PR OFFICE/OUTPT VISIT, NEW, LEVL IV, 45-59 MIN: ICD-10-PCS | Mod: S$PBB,,, | Performed by: NURSE PRACTITIONER

## 2019-04-12 PROCEDURE — 99204 OFFICE O/P NEW MOD 45 MIN: CPT | Mod: S$PBB,,, | Performed by: NURSE PRACTITIONER

## 2019-04-12 NOTE — PROGRESS NOTES
Subjective:       Patient ID: Bhupinder Garces is a 58 y.o. male.    Chief Complaint: Rectal Bleeding    HPI     58 M who presents to clinic for rectal bleeding with bowel movements for the past week. BRB with all BMs. Has a hard, straining bowel movement daily. Denies nay abdominal pain, rectal pain, change in bowel habits, unexplained weight loss. He is not on any anticoagulation.     Colonoscopy: Over 15 years ago per patient  No family history of colon or rectal cancer     Review of Systems   Constitutional: Negative for appetite change, chills, fatigue, fever and unexpected weight change.   Respiratory: Negative for shortness of breath.    Cardiovascular: Negative for chest pain.   Gastrointestinal: Positive for blood in stool. Negative for abdominal distention, abdominal pain, anal bleeding, constipation, diarrhea, nausea, rectal pain and vomiting.       Objective:      Physical Exam   Constitutional: He is oriented to person, place, and time. He appears well-developed and well-nourished.   Eyes: Conjunctivae and EOM are normal.   Pulmonary/Chest: Effort normal. No respiratory distress.   Abdominal: Soft. He exhibits no distension. There is no tenderness.   Genitourinary: Rectal exam shows no mass and no tenderness.   Genitourinary Comments: Normal perianal skin. eversion of anus revealed no abnormality or fissure, MARGO revealed no masses, blood or stool in vault, normal sphincter tone, anoscopy revealed grade IIinternal hemorrhoids with no bleeding or stigmata of same.     Musculoskeletal: Normal range of motion.   Neurological: He is alert and oriented to person, place, and time.   Skin: Skin is warm and dry.   Psychiatric: He has a normal mood and affect. His behavior is normal.       Assessment:       1. Internal hemorrhoid, bleeding    2. Screening for colon cancer        Plan:       High fiber diet  Fiber supplement  Avoid straining or sitting on the toilet for prolonged time periods  Schedule c scope  (possible RBL)  rtc prn

## 2019-04-16 DIAGNOSIS — Z12.11 SPECIAL SCREENING FOR MALIGNANT NEOPLASMS, COLON: Primary | ICD-10-CM

## 2019-04-16 RX ORDER — POLYETHYLENE GLYCOL 3350, SODIUM SULFATE ANHYDROUS, SODIUM BICARBONATE, SODIUM CHLORIDE, POTASSIUM CHLORIDE 236; 22.74; 6.74; 5.86; 2.97 G/4L; G/4L; G/4L; G/4L; G/4L
4 POWDER, FOR SOLUTION ORAL ONCE
Qty: 4000 ML | Refills: 0 | Status: SHIPPED | OUTPATIENT
Start: 2019-04-16 | End: 2019-04-16

## 2019-05-06 ENCOUNTER — ANESTHESIA EVENT (OUTPATIENT)
Dept: ENDOSCOPY | Facility: HOSPITAL | Age: 59
End: 2019-05-06
Payer: MEDICAID

## 2019-05-07 ENCOUNTER — ANESTHESIA (OUTPATIENT)
Dept: ENDOSCOPY | Facility: HOSPITAL | Age: 59
End: 2019-05-07
Payer: MEDICAID

## 2019-05-07 ENCOUNTER — HOSPITAL ENCOUNTER (OUTPATIENT)
Facility: HOSPITAL | Age: 59
Discharge: HOME | End: 2019-05-07
Attending: COLON & RECTAL SURGERY | Admitting: COLON & RECTAL SURGERY
Payer: MEDICAID

## 2019-05-07 VITALS
DIASTOLIC BLOOD PRESSURE: 89 MMHG | SYSTOLIC BLOOD PRESSURE: 148 MMHG | HEIGHT: 73 IN | TEMPERATURE: 98 F | BODY MASS INDEX: 35.12 KG/M2 | HEART RATE: 70 BPM | OXYGEN SATURATION: 96 % | WEIGHT: 265 LBS | RESPIRATION RATE: 16 BRPM

## 2019-05-07 DIAGNOSIS — Z12.11 SCREENING FOR COLON CANCER: Primary | ICD-10-CM

## 2019-05-07 PROCEDURE — E9220 PRA ENDO ANESTHESIA: HCPCS | Mod: ,,, | Performed by: NURSE ANESTHETIST, CERTIFIED REGISTERED

## 2019-05-07 PROCEDURE — 00811 ANES LWR INTST NDSC NOS: CPT | Performed by: COLON & RECTAL SURGERY

## 2019-05-07 PROCEDURE — 63600175 PHARM REV CODE 636 W HCPCS: Performed by: NURSE ANESTHETIST, CERTIFIED REGISTERED

## 2019-05-07 PROCEDURE — 27201012 HC FORCEPS, HOT/COLD, DISP: Performed by: COLON & RECTAL SURGERY

## 2019-05-07 PROCEDURE — 37000009 HC ANESTHESIA EA ADD 15 MINS: Performed by: COLON & RECTAL SURGERY

## 2019-05-07 PROCEDURE — 45380 PR COLONOSCOPY,BIOPSY: ICD-10-PCS | Mod: ,,, | Performed by: COLON & RECTAL SURGERY

## 2019-05-07 PROCEDURE — 25000003 PHARM REV CODE 250: Performed by: NURSE PRACTITIONER

## 2019-05-07 PROCEDURE — E9220 PRA ENDO ANESTHESIA: ICD-10-PCS | Mod: ,,, | Performed by: NURSE ANESTHETIST, CERTIFIED REGISTERED

## 2019-05-07 PROCEDURE — 37000008 HC ANESTHESIA 1ST 15 MINUTES: Performed by: COLON & RECTAL SURGERY

## 2019-05-07 PROCEDURE — 45380 COLONOSCOPY AND BIOPSY: CPT | Performed by: COLON & RECTAL SURGERY

## 2019-05-07 PROCEDURE — 88305 TISSUE SPECIMEN TO PATHOLOGY - SURGERY: ICD-10-PCS | Mod: 26,,, | Performed by: PATHOLOGY

## 2019-05-07 PROCEDURE — 45380 COLONOSCOPY AND BIOPSY: CPT | Mod: ,,, | Performed by: COLON & RECTAL SURGERY

## 2019-05-07 PROCEDURE — 88305 TISSUE EXAM BY PATHOLOGIST: CPT | Performed by: PATHOLOGY

## 2019-05-07 PROCEDURE — 88305 TISSUE EXAM BY PATHOLOGIST: CPT | Mod: 26,,, | Performed by: PATHOLOGY

## 2019-05-07 RX ORDER — SODIUM CHLORIDE 0.9 % (FLUSH) 0.9 %
10 SYRINGE (ML) INJECTION
Status: DISCONTINUED | OUTPATIENT
Start: 2019-05-07 | End: 2020-01-08

## 2019-05-07 RX ORDER — SODIUM CHLORIDE 9 MG/ML
INJECTION, SOLUTION INTRAVENOUS CONTINUOUS
Status: DISCONTINUED | OUTPATIENT
Start: 2019-05-07 | End: 2020-01-08

## 2019-05-07 RX ORDER — LIDOCAINE HCL/PF 100 MG/5ML
SYRINGE (ML) INTRAVENOUS
Status: DISCONTINUED | OUTPATIENT
Start: 2019-05-07 | End: 2019-05-07

## 2019-05-07 RX ORDER — PROPOFOL 10 MG/ML
VIAL (ML) INTRAVENOUS
Status: DISCONTINUED | OUTPATIENT
Start: 2019-05-07 | End: 2019-05-07

## 2019-05-07 RX ADMIN — PROPOFOL 50 MG: 10 INJECTION, EMULSION INTRAVENOUS at 12:05

## 2019-05-07 RX ADMIN — LIDOCAINE HYDROCHLORIDE 100 MG: 20 INJECTION, SOLUTION INTRAVENOUS at 12:05

## 2019-05-07 RX ADMIN — SODIUM CHLORIDE: 0.9 INJECTION, SOLUTION INTRAVENOUS at 10:05

## 2019-05-07 RX ADMIN — PROPOFOL 50 MG: 10 INJECTION, EMULSION INTRAVENOUS at 01:05

## 2019-05-07 RX ADMIN — PROPOFOL 100 MG: 10 INJECTION, EMULSION INTRAVENOUS at 12:05

## 2019-05-07 NOTE — PROVATION PATIENT INSTRUCTIONS
Discharge Summary/Instructions after an Endoscopic Procedure  Patient Name: Bhupinder Garecs  Patient MRN: [id number]  Patient YOB: 1960  Tuesday, May 07, 2019  Fermin Wade MD  RESTRICTIONS:  During your procedure today, you received medications for sedation.  These   medications may affect your judgment, balance and coordination.  Therefore,   for 24 hours, you have the following restrictions:   - DO NOT drive a car, operate machinery, make legal/financial decisions,   sign important papers or drink alcohol.    ACTIVITY:  Today: no heavy lifting, straining or running due to procedural   sedation/anesthesia.  The following day: return to full activity including work.  DIET:  Eat and drink normally unless instructed otherwise.     TREATMENT FOR COMMON SIDE EFFECTS:  - Mild abdominal pain, nausea, belching, bloating or excessive gas:  rest,   eat lightly and use a heating pad.  - Sore Throat: treat with throat lozenges and/or gargle with warm salt   water.  - Because air was used during the procedure, expelling large amounts of air   from your rectum or belching is normal.  - If a bowel prep was taken, you may not have a bowel movement for 1-3 days.    This is normal.  SYMPTOMS TO WATCH FOR AND REPORT TO YOUR PHYSICIAN:  1. Abdominal pain or bloating, other than gas cramps.  2. Chest pain.  3. Back pain.  4. Signs of infection such as: chills or fever occurring within 24 hours   after the procedure.  5. Rectal bleeding, which would show as bright red, maroon, or black stools.   (A tablespoon of blood from the rectum is not serious, especially if   hemorrhoids are present.)  6. Vomiting.  7. Weakness or dizziness.  GO DIRECTLY TO THE NEAREST EMERGENCY ROOM IF YOU HAVE ANY OF THE FOLLOWING:      Difficulty breathing              Chills and/or fever over 101 F   Persistent vomiting and/or vomiting blood   Severe abdominal pain   Severe chest pain   Black, tarry stools   Bleeding- more than one tablespoon   Any  other symptom or condition that you feel may need urgent attention  Your doctor recommends these additional instructions:  If any biopsies were taken, your doctors clinic will contact you in 1 to 2   weeks with any results.  - Discharge patient to home.   - Resume previous diet.   - Continue present medications.   - Await pathology results.   - Repeat colonoscopy date to be determined after pending pathology results   are reviewed for surveillance based on pathology results.   - Patient has a contact number available for emergencies.  The signs and   symptoms of potential delayed complications were discussed with the   patient.  Return to normal activities tomorrow.  Written discharge   instructions were provided to the patient.  For questions, problems or results please call your physician - Fermin Wade MD at Work:  [phone].  [facility], EMERGENCY ROOM PHONE NUMBER: [phone]  IF A COMPLICATION OR EMERGENCY SITUATION ARISES AND YOU ARE UNABLE TO REACH   YOUR PHYSICIAN - GO DIRECTLY TO THE EMERGENCY ROOM.  Fermin Wade MD  5/7/2019 1:14:14 PM  This report has been verified and signed electronically.  [employer]

## 2019-05-07 NOTE — PLAN OF CARE
POC reiviewed with pt. Pt verbalized understanding of discharge instructions including diet, s/s to notify md,  and follow up. pt refused wheelchair and will ambulate with family

## 2019-05-07 NOTE — H&P
History & Physical  Surgery      SUBJECTIVE:     Chief Complaint/Reason for Admission: Colonoscopy    History of Present Illness: Bhupinder Garces is a 58 y.o. male who presented to clinic 04/12/19 for rectal bleeding with bowel movements for one week. BRB with all BMs. Has a hard, straining bowel movement daily. Denies any abdominal pain, rectal pain, change in bowel habits, unexplained weight loss. He is not on any anticoagulation. Physical exam in office revealed Grade II internal hemorrhoids.     Colonoscopy: Over 15 years ago per patient  No family history of colon or rectal cancer           No current facility-administered medications on file prior to encounter.      Current Outpatient Medications on File Prior to Encounter   Medication Sig    tamsulosin (FLOMAX) 0.4 mg Cp24 Take 1 capsule (0.4 mg total) by mouth every evening.    valACYclovir (VALTREX) 500 MG tablet Take 1 tablet (500 mg total) by mouth once daily.       Review of patient's allergies indicates:  No Known Allergies    Past Medical History:   Diagnosis Date    Prostatitis      No past surgical history on file.  No family history on file.  Social History     Tobacco Use    Smoking status: Never Smoker   Substance Use Topics    Alcohol use: No    Drug use: Not on file        Review of Systems   Constitutional: Negative for appetite change, chills, diaphoresis, fatigue, fever and unexpected weight change.   HENT: Negative for congestion and sore throat.    Respiratory: Negative for shortness of breath and wheezing.    Cardiovascular: Negative for chest pain and palpitations.   Gastrointestinal: Negative for abdominal distention, abdominal pain, blood in stool, constipation and diarrhea.        Hard stools   Genitourinary: Negative for difficulty urinating and frequency.   Musculoskeletal: Negative for arthralgias and myalgias.   Skin: Negative for color change and pallor.   Neurological: Negative for light-headedness and headaches.    Psychiatric/Behavioral: Negative for confusion and hallucinations.     OBJECTIVE:     Vital Signs (Most Recent)  Temp: 97.5 °F (36.4 °C) (05/07/19 1028)  Pulse: 70 (05/07/19 1028)  Resp: 20 (05/07/19 1028)  BP: (!) 156/86 (05/07/19 1028)  SpO2: 96 % (05/07/19 1028)    Physical Exam   Constitutional: He is oriented to person, place, and time. He appears well-developed and well-nourished. No distress.   HENT:   Head: Normocephalic and atraumatic.   Eyes: Conjunctivae and EOM are normal.   Cardiovascular: Normal rate, regular rhythm and intact distal pulses.   Pulmonary/Chest: Effort normal. No respiratory distress.   Abdominal: Soft. He exhibits no distension. There is no tenderness.   Neurological: He is alert and oriented to person, place, and time.   Skin: Skin is warm and dry.   Psychiatric: He has a normal mood and affect. Thought content normal.   Vitals reviewed.    Laboratory  reviewed    Diagnostic Results:  reviewed    ASSESSMENT/PLAN:     A/P: 59 yo M with recent hx of bright red blood per rectum and daily hard stools presents for colonoscopy.    - will proceed with scope  - consent signed today

## 2019-05-07 NOTE — ANESTHESIA PREPROCEDURE EVALUATION
05/07/2019  Bhupinder Garces is a 58 y.o., male.    Anesthesia Evaluation    I have reviewed the Patient Summary Reports.     I have reviewed the Medications.     Review of Systems  Anesthesia Hx:  Neg history of prior surgery. Denies Family Hx of Anesthesia complications.   Denies Personal Hx of Anesthesia complications.   Social:  Alcohol Use, Social Alcohol Use    Hematology/Oncology:  Hematology Normal   Oncology Normal     EENT/Dental:EENT/Dental Normal   Cardiovascular:  Cardiovascular Normal Exercise tolerance: good     Pulmonary:  Pulmonary Normal    Renal/:  Renal/ Normal     Hepatic/GI:  Hepatic/GI Normal Bowel Prep.    Musculoskeletal:  Musculoskeletal Normal    Neurological:  Neurology Normal    Endocrine:  Endocrine Normal    Dermatological:  Skin Normal    Psych:  Psychiatric Normal           Physical Exam  General:  Well nourished, Obesity    Airway/Jaw/Neck:  Airway Findings: Mouth Opening: Normal Tongue: Normal  General Airway Assessment: Adult  Improves to I with phonation.  TM Distance: Normal, at least 6 cm     Eyes/Ears/Nose:  EYES/EARS/NOSE FINDINGS: Normal   Dental:  Dental Findings: In tact   Chest/Lungs:  Chest/Lungs Findings: Clear to auscultation, Normal Respiratory Rate     Heart/Vascular:  Heart Findings: Rate: Normal  Rhythm: Regular Rhythm  Sounds: Normal  Heart murmur: negative       Mental Status:  Mental Status Findings:  Cooperative, Alert and Oriented         Anesthesia Plan  Type of Anesthesia, risks & benefits discussed:  Anesthesia Type:  general  Patient's Preference: General  Intra-op Monitoring Plan: standard ASA monitors  Intra-op Monitoring Plan Comments:   Post Op Pain Control Plan:   Post Op Pain Control Plan Comments:   Induction:   IV  Beta Blocker:  Patient is not currently on a Beta-Blocker (No further documentation required).       Informed Consent: Patient  understands risks and agrees with Anesthesia plan.  Questions answered. Anesthesia consent signed with patient.  ASA Score: 2     Day of Surgery Review of History & Physical:    H&P update referred to the surgeon.         Ready For Surgery From Anesthesia Perspective.

## 2019-05-07 NOTE — DISCHARGE INSTRUCTIONS
Colonoscopy     A camera attached to a flexible tube with a viewing lens is used to take video pictures.     Colonoscopy is a test to view the inside of your lower digestive tract (colon and rectum). Sometimes it can show the last part of the small intestine (ileum). During the test, small pieces of tissue may be removed for testing. This is called a biopsy. Small growths, such as polyps, may also be removed.   Why is colonoscopy done?  The test is done to help look for colon cancer. And it can help find the source of abdominal pain, bleeding, and changes in bowel habits. It may be needed once a year, depending on factors such as your:  · Age  · Health history  · Family health history  · Symptoms  · Results from any prior colonoscopy  Risks and possible complications  These include:  · Bleeding               · A puncture or tear in the colon   · Risks of anesthesia  · A cancer lesion not being seen  Getting ready   To prepare for the test:  · Talk with your healthcare provider about the risks of the test (see below). Also ask your healthcare provider about alternatives to the test.  · Tell your healthcare provider about any medicines you take. Also tell him or her about any health conditions you may have.  · Make sure your rectum and colon are empty for the test. Follow the diet and bowel prep instructions exactly. If you dont, the test may need to be rescheduled.  · Plan for a friend or family member to drive you home after the test.     Colonoscopy provides an inside view of the entire colon.     You may discuss the results with your doctor right away or at a future visit.  During the test   The test is usually done in the hospital on an outpatient basis. This means you go home the same day. The procedure takes about 30 minutes. During that time:  · You are given relaxing (sedating) medicine through an IV line. You may be drowsy, or fully asleep.  · The healthcare provider will first give you a physical exam to  check for anal and rectal problems.  · Then the anus is lubricated and the scope inserted.  · If you are awake, you may have a feeling similar to needing to have a bowel movement. You may also feel pressure as air is pumped into the colon. Its OK to pass gas during the procedure.  · Biopsy, polyp removal, or other treatments may be done during the test.  After the test   You may have gas right after the test. It can help to try to pass it to help prevent later bloating. Your healthcare provider may discuss the results with you right away. Or you may need to schedule a follow-up visit to talk about the results. After the test, you can go back to your normal eating and other activities. You may be tired from the sedation and need to rest for a few hours.  Date Last Reviewed: 11/1/2016 © 2000-2017 [employer]. [address] [place] [address], [place] [address]. All rights reserved. This information is not intended as a substitute for professional medical care. Always follow your healthcare professional's instructions.

## 2019-05-07 NOTE — TRANSFER OF CARE
"Anesthesia Transfer of Care Note    Patient: Bhupinder Garces    Procedure(s) Performed: Procedure(s) (LRB):  COLONOSCOPY possible RBL  (N/A)    Patient location: GI    Anesthesia Type: general    Transport from OR: Transported from OR on room air with adequate spontaneous ventilation    Post pain: adequate analgesia    Post assessment: no apparent anesthetic complications and tolerated procedure well    Post vital signs: stable    Level of consciousness: awake, alert and oriented    Nausea/Vomiting: no nausea/vomiting    Complications: none    Transfer of care protocol was followed      Last vitals:   Visit Vitals  BP (!) 156/86 (BP Location: Left arm, Patient Position: Sitting)   Pulse 70   Temp 36.4 °C (97.5 °F) (Temporal)   Resp 20   Ht 6' 1" (1.854 m)   Wt 120.2 kg (265 lb)   SpO2 96%   BMI 34.96 kg/m²     "

## 2019-05-08 NOTE — ANESTHESIA POSTPROCEDURE EVALUATION
Anesthesia Post Evaluation    Patient: Bhupinder Garces    Procedure(s) Performed: Procedure(s) (LRB):  COLONOSCOPY possible RBL  (N/A)    Final Anesthesia Type: general  Patient location during evaluation: PACU  Patient participation: Yes- Able to Participate  Level of consciousness: awake and alert and oriented  Post-procedure vital signs: reviewed and stable  Pain management: adequate  Airway patency: patent  PONV status at discharge: No PONV  Anesthetic complications: no      Cardiovascular status: blood pressure returned to baseline and hemodynamically stable  Respiratory status: unassisted  Hydration status: euvolemic  Follow-up not needed.          Vitals Value Taken Time   /89 5/7/2019  1:45 PM   Temp 36.5 °C (97.7 °F) 5/7/2019  1:15 PM   Pulse 70 5/7/2019  1:45 PM   Resp 16 5/7/2019  1:45 PM   SpO2 96 % 5/7/2019  1:45 PM         Event Time     Out of Recovery 13:49:02          Pain/Leopoldo Score: Leopoldo Score: 10 (5/7/2019  1:30 PM)

## 2019-05-14 ENCOUNTER — TELEPHONE (OUTPATIENT)
Dept: ENDOSCOPY | Facility: HOSPITAL | Age: 59
End: 2019-05-14

## 2019-05-15 ENCOUNTER — PATIENT MESSAGE (OUTPATIENT)
Dept: SURGERY | Facility: CLINIC | Age: 59
End: 2019-05-15

## 2019-11-25 DIAGNOSIS — L29.3 ITCHING OF PENIS: ICD-10-CM

## 2019-11-25 RX ORDER — CLOTRIMAZOLE AND BETAMETHASONE DIPROPIONATE 10; .64 MG/G; MG/G
CREAM TOPICAL
Qty: 15 G | Refills: 0 | Status: SHIPPED | OUTPATIENT
Start: 2019-11-25 | End: 2020-01-08 | Stop reason: SDUPTHER

## 2019-12-23 ENCOUNTER — TELEPHONE (OUTPATIENT)
Dept: UROLOGY | Facility: CLINIC | Age: 59
End: 2019-12-23

## 2019-12-23 NOTE — TELEPHONE ENCOUNTER
Called pt and scheduled his appt per his request.  Pt verbalized understanding to all.  All questions answered.  appt mailed.

## 2019-12-23 NOTE — TELEPHONE ENCOUNTER
----- Message from Marily Alfaro sent at 12/23/2019  2:06 PM CST -----  Contact: pt  [phone]  Pt needs prescription ( valACYclovir (VALTREX) 500 MG tablet) refilled. Pt also needs to make appt.  I was not able to schedule appt.  Please call pt..

## 2020-01-08 ENCOUNTER — OFFICE VISIT (OUTPATIENT)
Dept: UROLOGY | Facility: CLINIC | Age: 60
End: 2020-01-08
Payer: MEDICAID

## 2020-01-08 ENCOUNTER — LAB VISIT (OUTPATIENT)
Dept: LAB | Facility: HOSPITAL | Age: 60
End: 2020-01-08
Payer: MEDICAID

## 2020-01-08 VITALS
HEIGHT: 73 IN | HEART RATE: 94 BPM | SYSTOLIC BLOOD PRESSURE: 187 MMHG | DIASTOLIC BLOOD PRESSURE: 95 MMHG | BODY MASS INDEX: 34.99 KG/M2 | WEIGHT: 264 LBS

## 2020-01-08 DIAGNOSIS — N40.1 BPH WITH URINARY OBSTRUCTION: ICD-10-CM

## 2020-01-08 DIAGNOSIS — N13.8 BPH WITH URINARY OBSTRUCTION: Primary | ICD-10-CM

## 2020-01-08 DIAGNOSIS — B00.9 HSV-2 INFECTION: ICD-10-CM

## 2020-01-08 DIAGNOSIS — N40.1 BPH WITH URINARY OBSTRUCTION: Primary | ICD-10-CM

## 2020-01-08 DIAGNOSIS — L29.3 ITCHING OF PENIS: ICD-10-CM

## 2020-01-08 DIAGNOSIS — B00.1 RECURRENT COLD SORES: ICD-10-CM

## 2020-01-08 DIAGNOSIS — N13.8 BPH WITH URINARY OBSTRUCTION: ICD-10-CM

## 2020-01-08 DIAGNOSIS — B00.9 HSV (HERPES SIMPLEX VIRUS) INFECTION: ICD-10-CM

## 2020-01-08 LAB
ALBUMIN SERPL BCP-MCNC: 3.9 G/DL (ref 3.5–5.2)
ALP SERPL-CCNC: 64 U/L (ref 55–135)
ALT SERPL W/O P-5'-P-CCNC: 31 U/L (ref 10–44)
AST SERPL-CCNC: 46 U/L (ref 10–40)
BASOPHILS # BLD AUTO: 0.03 K/UL (ref 0–0.2)
BASOPHILS NFR BLD: 0.4 % (ref 0–1.9)
BILIRUB DIRECT SERPL-MCNC: 0.3 MG/DL (ref 0.1–0.3)
BILIRUB SERPL-MCNC: 0.9 MG/DL (ref 0.1–1)
COMPLEXED PSA SERPL-MCNC: 0.59 NG/ML (ref 0–4)
CREAT SERPL-MCNC: 1.3 MG/DL (ref 0.5–1.4)
DIFFERENTIAL METHOD: ABNORMAL
EOSINOPHIL # BLD AUTO: 0 K/UL (ref 0–0.5)
EOSINOPHIL NFR BLD: 0.4 % (ref 0–8)
ERYTHROCYTE [DISTWIDTH] IN BLOOD BY AUTOMATED COUNT: 12.7 % (ref 11.5–14.5)
EST. GFR  (AFRICAN AMERICAN): >60 ML/MIN/1.73 M^2
EST. GFR  (NON AFRICAN AMERICAN): 59.3 ML/MIN/1.73 M^2
HCT VFR BLD AUTO: 41 % (ref 40–54)
HGB BLD-MCNC: 13.2 G/DL (ref 14–18)
IMM GRANULOCYTES # BLD AUTO: 0.01 K/UL (ref 0–0.04)
IMM GRANULOCYTES NFR BLD AUTO: 0.1 % (ref 0–0.5)
LYMPHOCYTES # BLD AUTO: 1.6 K/UL (ref 1–4.8)
LYMPHOCYTES NFR BLD: 20.5 % (ref 18–48)
MCH RBC QN AUTO: 28.9 PG (ref 27–31)
MCHC RBC AUTO-ENTMCNC: 32.2 G/DL (ref 32–36)
MCV RBC AUTO: 90 FL (ref 82–98)
MONOCYTES # BLD AUTO: 0.4 K/UL (ref 0.3–1)
MONOCYTES NFR BLD: 4.8 % (ref 4–15)
NEUTROPHILS # BLD AUTO: 5.9 K/UL (ref 1.8–7.7)
NEUTROPHILS NFR BLD: 73.8 % (ref 38–73)
NRBC BLD-RTO: 0 /100 WBC
PLATELET # BLD AUTO: 270 K/UL (ref 150–350)
PMV BLD AUTO: 9.5 FL (ref 9.2–12.9)
PROT SERPL-MCNC: 7.3 G/DL (ref 6–8.4)
RBC # BLD AUTO: 4.56 M/UL (ref 4.6–6.2)
WBC # BLD AUTO: 7.95 K/UL (ref 3.9–12.7)

## 2020-01-08 PROCEDURE — 80076 HEPATIC FUNCTION PANEL: CPT

## 2020-01-08 PROCEDURE — 99214 PR OFFICE/OUTPT VISIT, EST, LEVL IV, 30-39 MIN: ICD-10-PCS | Mod: S$PBB,,, | Performed by: NURSE PRACTITIONER

## 2020-01-08 PROCEDURE — 82565 ASSAY OF CREATININE: CPT

## 2020-01-08 PROCEDURE — 84153 ASSAY OF PSA TOTAL: CPT

## 2020-01-08 PROCEDURE — 99213 OFFICE O/P EST LOW 20 MIN: CPT | Mod: PBBFAC | Performed by: NURSE PRACTITIONER

## 2020-01-08 PROCEDURE — 36415 COLL VENOUS BLD VENIPUNCTURE: CPT

## 2020-01-08 PROCEDURE — 99999 PR PBB SHADOW E&M-EST. PATIENT-LVL III: CPT | Mod: PBBFAC,,, | Performed by: NURSE PRACTITIONER

## 2020-01-08 PROCEDURE — 99214 OFFICE O/P EST MOD 30 MIN: CPT | Mod: S$PBB,,, | Performed by: NURSE PRACTITIONER

## 2020-01-08 PROCEDURE — 85025 COMPLETE CBC W/AUTO DIFF WBC: CPT

## 2020-01-08 PROCEDURE — 99999 PR PBB SHADOW E&M-EST. PATIENT-LVL III: ICD-10-PCS | Mod: PBBFAC,,, | Performed by: NURSE PRACTITIONER

## 2020-01-08 RX ORDER — TAMSULOSIN HYDROCHLORIDE 0.4 MG/1
0.4 CAPSULE ORAL NIGHTLY
Qty: 90 CAPSULE | Refills: 3 | Status: SHIPPED | OUTPATIENT
Start: 2020-01-08 | End: 2021-03-23 | Stop reason: SDUPTHER

## 2020-01-08 RX ORDER — VALACYCLOVIR HYDROCHLORIDE 500 MG/1
500 TABLET, FILM COATED ORAL DAILY
Qty: 90 TABLET | Refills: 3 | Status: SHIPPED | OUTPATIENT
Start: 2020-01-08 | End: 2021-03-23 | Stop reason: SDUPTHER

## 2020-01-08 RX ORDER — CLOTRIMAZOLE AND BETAMETHASONE DIPROPIONATE 10; .64 MG/G; MG/G
CREAM TOPICAL
Qty: 15 G | Refills: 0 | Status: SHIPPED | OUTPATIENT
Start: 2020-01-08 | End: 2020-08-03

## 2020-01-08 NOTE — PROGRESS NOTES
Subjective:       Patient ID: Bhupinder Garces is a 59 y.o. male.    Chief Complaint: Benign Prostatic Hypertrophy (prostate check)    Bhupinder Garces is a 59 y.o. Male with history of BPH.  He has history of back pain, prostatitis, & hematospermia HSV (+) 1&2.  He was last seen in clinic 06/05/2018    Here today for f/u visit and medication refill.   Worsening LUTS; out of medication.   He drinks coffee all day; even at night    No issues with ED.                    PSA                  0.72                06/05/2018                 PSA                  0.56                03/03/2017                 PSA                  0.62                02/05/2015                   PSA                  0.40                03/22/2011                 PSA                  0.4                 10/01/2007                 PSA                  0.4                 12/06/2005                 PSA                  0.3                 04/12/2004                               Past Medical History:    Prostatitis                                                   No past surgical history on file.    No family history on file.      Social History    Marital Status:              Spouse Name:                       Years of Education:                 Number of children:               Occupational History    None on file    Social History Main Topics    Smoking Status: Never Smoker                      Smokeless Status: Not on file                       Alcohol Use: No              Drug Use: Not on file     Sexual Activity: Not on file          Other Topics            Concern    None on file    Social History Narrative    None on file        Allergies:  Review of patient's allergies indicates no known allergies.    Medications:  Current outpatient prescriptions: oxaprozin (DAYPRO) 600 mg tablet, Take 1 tablet (600 mg total) by mouth every 12 (twelve) hours. Take with food., Disp: 60 tablet, Rfl: 1;  tamsulosin (FLOMAX) 0.4 mg Cp24, TAKE 1 CAPSULE BY  MOUTH EVERY DAY, Disp: 90 capsule, Rfl: 11                Review of Systems   Constitutional: Negative for activity change, appetite change, chills and fever.   HENT: Positive for mouth sores. Negative for facial swelling and trouble swallowing.    Eyes: Negative for visual disturbance.   Respiratory: Negative for chest tightness and shortness of breath.    Cardiovascular: Negative for chest pain and palpitations.   Gastrointestinal: Negative.  Negative for abdominal pain, constipation, diarrhea, nausea and vomiting.   Genitourinary: Positive for frequency, nocturia and urgency. Negative for difficulty urinating, dysuria, flank pain, hematuria, penile pain, penile swelling, scrotal swelling and testicular pain.        FOS is good  Nocturia 3-4x     Musculoskeletal: Negative for back pain, gait problem, myalgias and neck stiffness.   Skin: Positive for rash.   Neurological: Negative for dizziness and speech difficulty.   Hematological: Does not bruise/bleed easily.   Psychiatric/Behavioral: Negative for behavioral problems.       Objective:      Physical Exam   Nursing note and vitals reviewed.  Constitutional: He is oriented to person, place, and time. Vital signs are normal. He appears well-developed and well-nourished. He is active and cooperative.  Non-toxic appearance. He does not have a sickly appearance.   Urine dipped clear of infection of in the office today.     HENT:   Head: Normocephalic and atraumatic.   Right Ear: External ear normal.   Left Ear: External ear normal.   Nose: Nose normal.   Mouth/Throat: Mucous membranes are normal.   Eyes: Conjunctivae and lids are normal. No scleral icterus.   Neck: Trachea normal, normal range of motion and full passive range of motion without pain. Neck supple. No JVD present. No tracheal deviation present.   Cardiovascular: Normal rate, S1 normal and S2 normal.    Pulmonary/Chest: Effort normal. No respiratory distress. He exhibits no tenderness.   Abdominal: Soft.  Normal appearance. There is no hepatosplenomegaly. There is no tenderness. There is no CVA tenderness.   Genitourinary: Rectum normal, testes normal and penis normal. Rectal exam shows no external hemorrhoid, no mass and no tenderness. Prostate is enlarged. Prostate is not tender.       Musculoskeletal: Normal range of motion.   Neurological: He is alert and oriented to person, place, and time. He has normal strength.   Skin: Skin is warm, dry and intact.     Psychiatric: He has a normal mood and affect. His behavior is normal. Judgment and thought content normal.       Assessment:       1. BPH with urinary obstruction    2. HSV (herpes simplex virus) infection    3. Itching of penis    4. HSV-2 infection    5. Recurrent cold sores        Plan:         I spent 25 minutes with the patient of which more than half was spent in direct consultation with the patient in regards to our treatment and plan.    Education and recommendations of today's plan of care including home remedies.  We discussed his LUTS; diet modifications.   Bladder irritants and coffee.  Refilled his medication.  RTC one year with PSA

## 2020-04-01 ENCOUNTER — HOSPITAL ENCOUNTER (EMERGENCY)
Facility: HOSPITAL | Age: 60
Discharge: HOME | End: 2020-04-01
Payer: MEDICAID

## 2020-04-01 VITALS
DIASTOLIC BLOOD PRESSURE: 93 MMHG | SYSTOLIC BLOOD PRESSURE: 167 MMHG | HEART RATE: 80 BPM | OXYGEN SATURATION: 99 % | TEMPERATURE: 98 F | RESPIRATION RATE: 18 BRPM

## 2020-04-01 DIAGNOSIS — R05.9 COUGH: Primary | ICD-10-CM

## 2020-04-01 DIAGNOSIS — R07.89 CHEST WALL PAIN: ICD-10-CM

## 2020-04-01 DIAGNOSIS — R07.9 CHEST PAIN: ICD-10-CM

## 2020-04-01 PROCEDURE — 99284 EMERGENCY DEPT VISIT MOD MDM: CPT | Mod: 25,ER

## 2020-04-01 RX ORDER — BENZONATATE 100 MG/1
100 CAPSULE ORAL 3 TIMES DAILY PRN
Qty: 20 CAPSULE | Refills: 0 | Status: SHIPPED | OUTPATIENT
Start: 2020-04-01 | End: 2021-11-03 | Stop reason: ALTCHOICE

## 2020-04-01 NOTE — DISCHARGE INSTRUCTIONS
Stay at home  Check for fever with a thermometer. If temp >100.4 can take tylenol or motrin  Social distancing  Can call [phone] or [phone] for any further questions of concerns about COVID-19  Return to ED ONLY if having bad shortness of breath

## 2020-04-01 NOTE — ED PROVIDER NOTES
Encounter Date: 4/1/2020       History     Chief Complaint   Patient presents with    Chest Pain    Cough     Nonproductive cough with chest wall pain x 2-3 days. No fever. No dyspnea. No known exposure to covid. Well appearing and no distress.    The history is provided by the patient.     Review of patient's allergies indicates:  No Known Allergies  Past Medical History:   Diagnosis Date    Prostatitis      Past Surgical History:   Procedure Laterality Date    COLONOSCOPY N/A 5/7/2019    Procedure: COLONOSCOPY possible RBL ;  Surgeon: Fermin Wade MD;  Location: [facility] ([address] [place] [address]);  Service: Colon and Rectal;  Laterality: N/A;     No family history on file.  Social History     Tobacco Use    Smoking status: Never Smoker   Substance Use Topics    Alcohol use: No    Drug use: Never     Review of Systems   Constitutional: Negative for chills and fever.   HENT: Negative for congestion.    Respiratory: Positive for cough. Negative for shortness of breath and wheezing.    Cardiovascular: Negative for chest pain.   Gastrointestinal: Negative for abdominal pain, diarrhea, nausea and vomiting.   All other systems reviewed and are negative.      Physical Exam     Initial Vitals [04/01/20 1121]   BP Pulse Resp Temp SpO2   (!) 188/100 80 16 98.2 °F (36.8 °C) 97 %      MAP       --         Physical Exam    Nursing note and vitals reviewed.  Constitutional: He appears well-developed and well-nourished.   HENT:   Head: Normocephalic and atraumatic.   Eyes: Conjunctivae and EOM are normal.   Neck: Normal range of motion. Neck supple.   Cardiovascular: Normal rate and regular rhythm.   Pulmonary/Chest: No respiratory distress.   No increased work of breathing.   Speaking in full sentences   Musculoskeletal: Normal range of motion.   Neurological: He is alert. GCS score is 15. GCS eye subscore is 4. GCS verbal subscore is 5. GCS motor subscore is 6.   Psychiatric: He has a normal mood and affect. His behavior is normal.          ED Course   Procedures  Labs Reviewed - No data to display  EKG Readings: (Independently Interpreted)   Rhythm: Normal Sinus Rhythm. Ectopy: No Ectopy. Conduction: Normal. ST Segments: Normal ST Segments. T Waves: Normal. Clinical Impression: Normal Sinus Rhythm       Imaging Results          X-Ray Chest AP Portable (Final result)  Result time 04/01/20 12:27:27    Final result by Sanchez Garcia MD (04/01/20 12:27:27)                 Impression:      No acute abnormality.      Electronically signed by: Sanchez Garcia  Date:    04/01/2020  Time:    12:27             Narrative:    EXAMINATION:  XR CHEST AP PORTABLE    CLINICAL HISTORY:  Chest Pain;.    TECHNIQUE:  Single frontal portable view of the chest was performed.    COMPARISON:  None    FINDINGS:  Support devices: None    The lungs are clear, with normal appearance of pulmonary vasculature and no pleural effusion or pneumothorax.    The cardiac silhouette is normal in size. The hilar and mediastinal contours are unremarkable.    Bones are intact.                                 Medical Decision Making:   Clinical Tests:   Radiological Study: Ordered and Reviewed  Medical Tests: Ordered and Reviewed  ED Management:  Does not meed current criteria for COVID testing  CXR unremarkable.  No distress/dyspnea/fever.  COVID precautions  ED return precautions.                                   Clinical Impression:       ICD-10-CM ICD-9-CM   1. Cough R05 786.2   2. Chest pain R07.9 786.50   3. Chest wall pain R07.89 786.52         Disposition:   Disposition: Discharged  Condition: Stable     ED Disposition Condition    Discharge Stable        ED Prescriptions     Medication Sig Dispense Start Date End Date Auth. Provider    benzonatate (TESSALON) 100 MG capsule Take 1 capsule (100 mg total) by mouth 3 (three) times daily as needed for Cough. 20 capsule 4/1/2020  Orlando Garza MD        Follow-up Information    None                                    Orlando BEE  MD Kayla  04/01/20 [id number]

## 2020-04-01 NOTE — ED NOTES
Pt to rm 3 via ambulation c/o midsternal chest pain radiating to back 8/10 with dry cough x4 days. Afebrile at this time with no hx of fever. Pt placed on monitor. EKG obtained. VSS. A/OX4. NAD noted. Pt waiting to be seen by ERP at this time. RN will continue to monitor.

## 2020-12-21 ENCOUNTER — IMMUNIZATION (OUTPATIENT)
Dept: PHARMACY | Facility: CLINIC | Age: 60
End: 2020-12-21
Payer: MEDICAID

## 2020-12-21 ENCOUNTER — PATIENT MESSAGE (OUTPATIENT)
Dept: PHARMACY | Facility: CLINIC | Age: 60
End: 2020-12-21

## 2020-12-23 ENCOUNTER — TELEPHONE (OUTPATIENT)
Dept: SURGERY | Facility: CLINIC | Age: 60
End: 2020-12-23

## 2020-12-23 NOTE — TELEPHONE ENCOUNTER
----- Message from Shannon Smith sent at 12/23/2020  8:24 AM CST -----  Regarding: Appt  Pt is calling to speak with Staff because he was last seen by Sofia Morrow for blood in his stool in 2019.  Pt has Medicaid and needs to be scheduled for the same issue.  In addition, he had a Colonoscopy with Dr. Fermin Wade, however,  did not find his name to come up for scheduling in the process.    He is requesting a returned call for scheduling with any provider taking Medicaid at [phone], or [phone].    Thank you.

## 2020-12-23 NOTE — TELEPHONE ENCOUNTER
Left message to return call regarding scheduling an appt. Will message patient through the portal as well.

## 2021-01-12 ENCOUNTER — TELEPHONE (OUTPATIENT)
Dept: SURGERY | Facility: CLINIC | Age: 61
End: 2021-01-12

## 2021-03-19 ENCOUNTER — TELEPHONE (OUTPATIENT)
Dept: UROLOGY | Facility: CLINIC | Age: 61
End: 2021-03-19

## 2021-03-23 ENCOUNTER — LAB VISIT (OUTPATIENT)
Dept: LAB | Facility: HOSPITAL | Age: 61
End: 2021-03-23
Payer: MEDICAID

## 2021-03-23 ENCOUNTER — OFFICE VISIT (OUTPATIENT)
Dept: UROLOGY | Facility: CLINIC | Age: 61
End: 2021-03-23
Payer: MEDICAID

## 2021-03-23 VITALS
HEIGHT: 73 IN | SYSTOLIC BLOOD PRESSURE: 160 MMHG | WEIGHT: 263.25 LBS | BODY MASS INDEX: 34.89 KG/M2 | HEART RATE: 87 BPM | DIASTOLIC BLOOD PRESSURE: 84 MMHG

## 2021-03-23 DIAGNOSIS — B00.9 HSV-2 INFECTION: ICD-10-CM

## 2021-03-23 DIAGNOSIS — N40.1 BPH WITH URINARY OBSTRUCTION: ICD-10-CM

## 2021-03-23 DIAGNOSIS — N40.1 BPH WITH URINARY OBSTRUCTION: Primary | ICD-10-CM

## 2021-03-23 DIAGNOSIS — N13.8 BPH WITH URINARY OBSTRUCTION: ICD-10-CM

## 2021-03-23 DIAGNOSIS — B00.1 RECURRENT COLD SORES: ICD-10-CM

## 2021-03-23 DIAGNOSIS — N13.8 BPH WITH URINARY OBSTRUCTION: Primary | ICD-10-CM

## 2021-03-23 DIAGNOSIS — R33.9 INCOMPLETE EMPTYING OF BLADDER: ICD-10-CM

## 2021-03-23 DIAGNOSIS — B00.9 HSV (HERPES SIMPLEX VIRUS) INFECTION: ICD-10-CM

## 2021-03-23 LAB
BILIRUB SERPL-MCNC: NORMAL MG/DL
BLOOD URINE, POC: NORMAL
CLARITY, POC UA: CLEAR
COLOR, POC UA: YELLOW
COMPLEXED PSA SERPL-MCNC: 0.83 NG/ML (ref 0–4)
GLUCOSE UR QL STRIP: NORMAL
KETONES UR QL STRIP: NORMAL
LEUKOCYTE ESTERASE URINE, POC: NORMAL
NITRITE, POC UA: NORMAL
PH, POC UA: 5
POC RESIDUAL URINE VOLUME: 160 ML (ref 0–100)
PROTEIN, POC: NORMAL
SPECIFIC GRAVITY, POC UA: 1.02
UROBILINOGEN, POC UA: NORMAL

## 2021-03-23 PROCEDURE — 99214 OFFICE O/P EST MOD 30 MIN: CPT | Mod: S$PBB,,, | Performed by: NURSE PRACTITIONER

## 2021-03-23 PROCEDURE — 81002 URINALYSIS NONAUTO W/O SCOPE: CPT | Mod: PBBFAC | Performed by: NURSE PRACTITIONER

## 2021-03-23 PROCEDURE — 99999 PR PBB SHADOW E&M-EST. PATIENT-LVL III: ICD-10-PCS | Mod: PBBFAC,,, | Performed by: NURSE PRACTITIONER

## 2021-03-23 PROCEDURE — 99999 PR PBB SHADOW E&M-EST. PATIENT-LVL III: CPT | Mod: PBBFAC,,, | Performed by: NURSE PRACTITIONER

## 2021-03-23 PROCEDURE — 99213 OFFICE O/P EST LOW 20 MIN: CPT | Mod: PBBFAC,25 | Performed by: NURSE PRACTITIONER

## 2021-03-23 PROCEDURE — 99214 PR OFFICE/OUTPT VISIT, EST, LEVL IV, 30-39 MIN: ICD-10-PCS | Mod: S$PBB,,, | Performed by: NURSE PRACTITIONER

## 2021-03-23 PROCEDURE — 51798 US URINE CAPACITY MEASURE: CPT | Mod: PBBFAC | Performed by: NURSE PRACTITIONER

## 2021-03-23 PROCEDURE — 36415 COLL VENOUS BLD VENIPUNCTURE: CPT | Performed by: NURSE PRACTITIONER

## 2021-03-23 PROCEDURE — 84153 ASSAY OF PSA TOTAL: CPT | Performed by: NURSE PRACTITIONER

## 2021-03-23 RX ORDER — TAMSULOSIN HYDROCHLORIDE 0.4 MG/1
0.4 CAPSULE ORAL NIGHTLY
Qty: 90 CAPSULE | Refills: 3 | Status: SHIPPED | OUTPATIENT
Start: 2021-03-23 | End: 2021-11-03 | Stop reason: SDUPTHER

## 2021-03-23 RX ORDER — VALACYCLOVIR HYDROCHLORIDE 1 G/1
1000 TABLET, FILM COATED ORAL 3 TIMES DAILY
Qty: 21 TABLET | Refills: 0 | Status: SHIPPED | OUTPATIENT
Start: 2021-03-23 | End: 2021-03-30

## 2021-03-23 RX ORDER — VALACYCLOVIR HYDROCHLORIDE 500 MG/1
500 TABLET, FILM COATED ORAL DAILY
Qty: 90 TABLET | Refills: 0 | Status: SHIPPED | OUTPATIENT
Start: 2021-03-31 | End: 2021-11-03 | Stop reason: SDUPTHER

## 2021-03-24 ENCOUNTER — TELEPHONE (OUTPATIENT)
Dept: UROLOGY | Facility: CLINIC | Age: 61
End: 2021-03-24

## 2021-03-25 ENCOUNTER — TELEPHONE (OUTPATIENT)
Dept: UROLOGY | Facility: CLINIC | Age: 61
End: 2021-03-25

## 2021-10-21 ENCOUNTER — OFFICE VISIT (OUTPATIENT)
Dept: SURGERY | Facility: CLINIC | Age: 61
End: 2021-10-21
Payer: MEDICAID

## 2021-10-21 VITALS
HEART RATE: 97 BPM | WEIGHT: 263.44 LBS | BODY MASS INDEX: 34.76 KG/M2 | DIASTOLIC BLOOD PRESSURE: 91 MMHG | SYSTOLIC BLOOD PRESSURE: 156 MMHG

## 2021-10-21 DIAGNOSIS — K64.8 INTERNAL HEMORRHOID, BLEEDING: Primary | ICD-10-CM

## 2021-10-21 PROCEDURE — 99999 PR PBB SHADOW E&M-EST. PATIENT-LVL III: ICD-10-PCS | Mod: PBBFAC,,, | Performed by: NURSE PRACTITIONER

## 2021-10-21 PROCEDURE — 99213 OFFICE O/P EST LOW 20 MIN: CPT | Mod: S$PBB,25,, | Performed by: NURSE PRACTITIONER

## 2021-10-21 PROCEDURE — 46600 DIAGNOSTIC ANOSCOPY SPX: CPT | Mod: S$PBB,,, | Performed by: NURSE PRACTITIONER

## 2021-10-21 PROCEDURE — 99213 PR OFFICE/OUTPT VISIT, EST, LEVL III, 20-29 MIN: ICD-10-PCS | Mod: S$PBB,25,, | Performed by: NURSE PRACTITIONER

## 2021-10-21 PROCEDURE — 46600 PR DIAG2STIC A2SCOPY: ICD-10-PCS | Mod: S$PBB,,, | Performed by: NURSE PRACTITIONER

## 2021-10-21 PROCEDURE — 46600 DIAGNOSTIC ANOSCOPY SPX: CPT | Mod: PBBFAC | Performed by: NURSE PRACTITIONER

## 2021-10-21 PROCEDURE — 99213 OFFICE O/P EST LOW 20 MIN: CPT | Mod: PBBFAC | Performed by: NURSE PRACTITIONER

## 2021-10-21 PROCEDURE — 99999 PR PBB SHADOW E&M-EST. PATIENT-LVL III: CPT | Mod: PBBFAC,,, | Performed by: NURSE PRACTITIONER

## 2021-10-21 RX ORDER — HYDROCORTISONE 25 MG/G
CREAM TOPICAL 2 TIMES DAILY
Qty: 28 G | Refills: 2 | Status: SHIPPED | OUTPATIENT
Start: 2021-10-21 | End: 2022-09-23 | Stop reason: CLARIF

## 2021-10-21 RX ORDER — HYDROCORTISONE ACETATE 25 MG/1
25 SUPPOSITORY RECTAL 2 TIMES DAILY
Qty: 20 SUPPOSITORY | Refills: 1 | Status: SHIPPED | OUTPATIENT
Start: 2021-10-21 | End: 2021-11-10

## 2021-11-03 ENCOUNTER — LAB VISIT (OUTPATIENT)
Dept: LAB | Facility: HOSPITAL | Age: 61
End: 2021-11-03
Payer: MEDICAID

## 2021-11-03 ENCOUNTER — OFFICE VISIT (OUTPATIENT)
Dept: UROLOGY | Facility: CLINIC | Age: 61
End: 2021-11-03
Payer: MEDICAID

## 2021-11-03 VITALS
DIASTOLIC BLOOD PRESSURE: 85 MMHG | BODY MASS INDEX: 34.99 KG/M2 | HEIGHT: 73 IN | SYSTOLIC BLOOD PRESSURE: 167 MMHG | HEART RATE: 80 BPM | WEIGHT: 264 LBS

## 2021-11-03 DIAGNOSIS — N40.1 BPH WITH URINARY OBSTRUCTION: ICD-10-CM

## 2021-11-03 DIAGNOSIS — N40.1 BPH WITH URINARY OBSTRUCTION: Primary | ICD-10-CM

## 2021-11-03 DIAGNOSIS — R35.0 URINARY FREQUENCY: ICD-10-CM

## 2021-11-03 DIAGNOSIS — B00.9 HSV-2 INFECTION: ICD-10-CM

## 2021-11-03 DIAGNOSIS — R10.2 MALE PELVIC PAIN: ICD-10-CM

## 2021-11-03 DIAGNOSIS — B00.1 RECURRENT COLD SORES: ICD-10-CM

## 2021-11-03 DIAGNOSIS — B00.9 HSV (HERPES SIMPLEX VIRUS) INFECTION: ICD-10-CM

## 2021-11-03 DIAGNOSIS — N13.8 BPH WITH URINARY OBSTRUCTION: Primary | ICD-10-CM

## 2021-11-03 DIAGNOSIS — N13.8 BPH WITH URINARY OBSTRUCTION: ICD-10-CM

## 2021-11-03 LAB
ALBUMIN SERPL BCP-MCNC: 3.9 G/DL (ref 3.5–5.2)
ALP SERPL-CCNC: 63 U/L (ref 55–135)
ALT SERPL W/O P-5'-P-CCNC: 23 U/L (ref 10–44)
AST SERPL-CCNC: 38 U/L (ref 10–40)
BILIRUB DIRECT SERPL-MCNC: 0.2 MG/DL (ref 0.1–0.3)
BILIRUB SERPL-MCNC: 0.8 MG/DL (ref 0.1–1)
COMPLEXED PSA SERPL-MCNC: 0.93 NG/ML (ref 0–4)
CREAT SERPL-MCNC: 1.3 MG/DL (ref 0.5–1.4)
EST. GFR  (AFRICAN AMERICAN): >60 ML/MIN/1.73 M^2
EST. GFR  (NON AFRICAN AMERICAN): 58.9 ML/MIN/1.73 M^2
PROT SERPL-MCNC: 7.6 G/DL (ref 6–8.4)

## 2021-11-03 PROCEDURE — 82565 ASSAY OF CREATININE: CPT | Performed by: NURSE PRACTITIONER

## 2021-11-03 PROCEDURE — 99999 PR PBB SHADOW E&M-EST. PATIENT-LVL III: ICD-10-PCS | Mod: PBBFAC,,, | Performed by: NURSE PRACTITIONER

## 2021-11-03 PROCEDURE — 99999 PR PBB SHADOW E&M-EST. PATIENT-LVL III: CPT | Mod: PBBFAC,,, | Performed by: NURSE PRACTITIONER

## 2021-11-03 PROCEDURE — 99215 OFFICE O/P EST HI 40 MIN: CPT | Mod: S$PBB,,, | Performed by: NURSE PRACTITIONER

## 2021-11-03 PROCEDURE — 99213 OFFICE O/P EST LOW 20 MIN: CPT | Mod: PBBFAC | Performed by: NURSE PRACTITIONER

## 2021-11-03 PROCEDURE — 99215 PR OFFICE/OUTPT VISIT, EST, LEVL V, 40-54 MIN: ICD-10-PCS | Mod: S$PBB,,, | Performed by: NURSE PRACTITIONER

## 2021-11-03 PROCEDURE — 84153 ASSAY OF PSA TOTAL: CPT | Performed by: NURSE PRACTITIONER

## 2021-11-03 PROCEDURE — 36415 COLL VENOUS BLD VENIPUNCTURE: CPT | Performed by: NURSE PRACTITIONER

## 2021-11-03 PROCEDURE — 80076 HEPATIC FUNCTION PANEL: CPT | Performed by: NURSE PRACTITIONER

## 2021-11-03 RX ORDER — NAPROXEN 500 MG/1
500 TABLET ORAL 2 TIMES DAILY WITH MEALS
Qty: 30 TABLET | Refills: 1 | Status: SHIPPED | OUTPATIENT
Start: 2021-11-03 | End: 2022-06-24

## 2021-11-03 RX ORDER — VALACYCLOVIR HYDROCHLORIDE 500 MG/1
500 TABLET, FILM COATED ORAL DAILY
Qty: 90 TABLET | Refills: 3 | Status: SHIPPED | OUTPATIENT
Start: 2021-11-03 | End: 2022-09-23 | Stop reason: CLARIF

## 2021-11-03 RX ORDER — VALACYCLOVIR HYDROCHLORIDE 1 G/1
TABLET, FILM COATED ORAL
Qty: 6 TABLET | Refills: 11 | Status: SHIPPED | OUTPATIENT
Start: 2021-11-03 | End: 2022-09-23 | Stop reason: CLARIF

## 2021-11-03 RX ORDER — TAMSULOSIN HYDROCHLORIDE 0.4 MG/1
0.4 CAPSULE ORAL NIGHTLY
Qty: 90 CAPSULE | Refills: 3 | Status: SHIPPED | OUTPATIENT
Start: 2021-11-03 | End: 2022-09-23 | Stop reason: CLARIF

## 2021-12-01 ENCOUNTER — TELEPHONE (OUTPATIENT)
Dept: SURGERY | Facility: CLINIC | Age: 61
End: 2021-12-01
Payer: MEDICAID

## 2022-03-18 ENCOUNTER — TELEPHONE (OUTPATIENT)
Dept: GASTROENTEROLOGY | Facility: CLINIC | Age: 62
End: 2022-03-18
Payer: MEDICAID

## 2022-03-18 ENCOUNTER — TELEPHONE (OUTPATIENT)
Dept: SURGERY | Facility: CLINIC | Age: 62
End: 2022-03-18
Payer: MEDICAID

## 2022-03-18 NOTE — TELEPHONE ENCOUNTER
----- Message from Emilee Chaudhari sent at 3/18/2022  3:14 PM CDT -----  Regarding: Appointment  Contact: [phone]  Calling in regard to scheduling an appointment for blood in stool as soon as possible. Please call and schedule.

## 2022-03-18 NOTE — TELEPHONE ENCOUNTER
----- Message from Nikos King MA sent at 3/18/2022  3:24 PM CDT -----  Regarding: FW: Appointment  Contact: [phone]  Can you schedule this patient with an appointment? They have medicaid and I cannot. Let me know if you can't do it.Thanks.  ----- Message -----  From: Marlene Nguyen NP  Sent: 3/18/2022   3:17 PM CDT  To: Nikos King MA  Subject: FW: Appointment                                    ----- Message -----  From: Emilee Chaudhari  Sent: 3/18/2022   3:16 PM CDT  To: [facility] Clinical Staff, Wendy Rosario Staff  Subject: Appointment                                      Calling in regard to scheduling an appointment for blood in stool as soon as possible. Please call and schedule.

## 2022-04-18 NOTE — PROGRESS NOTES
"[facility]  Colon and Rectal Surgery    [address]  [place], [place]  Tel: [phone]  Fax: [phone]  [email or web address]   MD Maikel Garcia MD Brian Kann, MD W. Forrest Johnston, MD Matthew Giglia, MD Jennifer Paruch, MD William Kethman, MD Danielle Kay, MD     Patient name: Bhupinder Garces   YOB: 1960   MRN: [id number]  Date of visit: 04/19/2022    It was a pleasure seeing Mr. Garces in the Colon and Rectal Surgery clinic here at [facility].     As you know, Mr. Garces is a 61 year old man with a history of BPH who presents for evaluation of hemorrhoidal disease. He was seen previously by Ms. Nguyen - described intermittent bleeding with bowel movements with some prolapse of tissue and pruritis. He has had RBL approximately 20 years ago. He did note some straining and prolonged time on the toilet. He was not taking fiber or stool softeners at that time. He continues to have constipation and he strains to go to the bathroom. He still is not taking stool softeners or fiber because he didn't know if they were safe. He has no family history of CRC.    Last Colonoscopy completed on 5/7/2019  - One 4 mm polyp in the transverse colon, removed with a jumbo cold forceps. Resected and retrieved.   - The examination was otherwise normal  - Repeat in 10 years    The patient was informed of the availability of a certified  without charge. A certified  was not necessary for this visit.    Physical Examination  BP (!) 219/108 (BP Location: Left arm, Patient Position: Sitting, BP Method: Large (Automatic))   Pulse 95   Ht 6' 1" (1.854 m)   Wt 121.9 kg (268 lb 11.9 oz)   BMI 35.46 kg/m²      A chaperone was present for the physical examination.    Constitutional: well developed, no cough, no dyspnea, alert, and no acute distress    Head: Normocephalic, no lesions, without obvious abnormality  Eye: Normal external " eye, conjunctiva, and lids  Cardiovascular: regular rate and regular rhythm  Respiratory: normal air entry  Gastrointestinal: soft, non-tender    Perianal Skin: Normal  Digital Rectal Exam:  Normal resting tone  Normal squeeze pressure   Relaxation with bear down is present  Mass(es) appreciated: none    Musculoskeletal: full range of motion without pain  Neurologic: alert, oriented, normal speech, no focal findings or movement disorder noted  Psychiatric: appropriate, normal mood    Unable to tolerate anoscopy - no gross blood on MARGO    Assessment and Plan of Care    Thank you again for referring Mr. Garces to my care. In summary, Mr. Garces is a 61 year old man presenting with hemorrhoidal disease. We discussed treatment options and have provided the following recommendations:    1. We had a discussion about conservative management options for symptoms related to hemorrhoids. Irritation or itching can be treated with Lidocaine cream/ointment, over-the-counter Hydrocortisone suppositories, and warm baths or soaking. Bleeding often improves by reducing hard stools and straining - Fiber supplementation (Metamucil 20-30 grams daily) and hydration (1.5 - 2 L daily) are effective. For him specifically, I have recommended Miralax (capful) daily in a bottle of water to prevent constipation and improve bowel function. Regular exercise, avoiding constipating medications (narcotic pain medications), limiting alcohol and fatty foods, reduced time on the toilet and focusing on not straining can also be effective. If symptoms persist will consider repeat colonoscopy.    Please do not hesitate to contact me if you have any questions.      Celso Garcia MD - Staff Surgeon  Department of Colon & Rectal Surgery  [facility]

## 2022-04-19 ENCOUNTER — OFFICE VISIT (OUTPATIENT)
Dept: SURGERY | Facility: CLINIC | Age: 62
End: 2022-04-19
Payer: COMMERCIAL

## 2022-04-19 VITALS
HEART RATE: 95 BPM | WEIGHT: 268.75 LBS | DIASTOLIC BLOOD PRESSURE: 108 MMHG | SYSTOLIC BLOOD PRESSURE: 219 MMHG | BODY MASS INDEX: 35.62 KG/M2 | HEIGHT: 73 IN

## 2022-04-19 DIAGNOSIS — K64.8 INTERNAL HEMORRHOID, BLEEDING: Primary | ICD-10-CM

## 2022-04-19 PROCEDURE — 1159F MED LIST DOCD IN RCRD: CPT | Mod: CPTII,S$GLB,, | Performed by: STUDENT IN AN ORGANIZED HEALTH CARE EDUCATION/TRAINING PROGRAM

## 2022-04-19 PROCEDURE — 99214 OFFICE O/P EST MOD 30 MIN: CPT | Mod: S$GLB,,, | Performed by: STUDENT IN AN ORGANIZED HEALTH CARE EDUCATION/TRAINING PROGRAM

## 2022-04-19 PROCEDURE — 99999 PR PBB SHADOW E&M-EST. PATIENT-LVL III: CPT | Mod: PBBFAC,,, | Performed by: STUDENT IN AN ORGANIZED HEALTH CARE EDUCATION/TRAINING PROGRAM

## 2022-04-19 PROCEDURE — 3008F BODY MASS INDEX DOCD: CPT | Mod: CPTII,S$GLB,, | Performed by: STUDENT IN AN ORGANIZED HEALTH CARE EDUCATION/TRAINING PROGRAM

## 2022-04-19 PROCEDURE — 3008F PR BODY MASS INDEX (BMI) DOCUMENTED: ICD-10-PCS | Mod: CPTII,S$GLB,, | Performed by: STUDENT IN AN ORGANIZED HEALTH CARE EDUCATION/TRAINING PROGRAM

## 2022-04-19 PROCEDURE — 99213 OFFICE O/P EST LOW 20 MIN: CPT | Mod: PBBFAC | Performed by: STUDENT IN AN ORGANIZED HEALTH CARE EDUCATION/TRAINING PROGRAM

## 2022-04-19 PROCEDURE — 3077F SYST BP >= 140 MM HG: CPT | Mod: CPTII,S$GLB,, | Performed by: STUDENT IN AN ORGANIZED HEALTH CARE EDUCATION/TRAINING PROGRAM

## 2022-04-19 PROCEDURE — 3077F PR MOST RECENT SYSTOLIC BLOOD PRESSURE >= 140 MM HG: ICD-10-PCS | Mod: CPTII,S$GLB,, | Performed by: STUDENT IN AN ORGANIZED HEALTH CARE EDUCATION/TRAINING PROGRAM

## 2022-04-19 PROCEDURE — 99214 PR OFFICE/OUTPT VISIT, EST, LEVL IV, 30-39 MIN: ICD-10-PCS | Mod: S$GLB,,, | Performed by: STUDENT IN AN ORGANIZED HEALTH CARE EDUCATION/TRAINING PROGRAM

## 2022-04-19 PROCEDURE — 3080F DIAST BP >= 90 MM HG: CPT | Mod: CPTII,S$GLB,, | Performed by: STUDENT IN AN ORGANIZED HEALTH CARE EDUCATION/TRAINING PROGRAM

## 2022-04-19 PROCEDURE — 1159F PR MEDICATION LIST DOCUMENTED IN MEDICAL RECORD: ICD-10-PCS | Mod: CPTII,S$GLB,, | Performed by: STUDENT IN AN ORGANIZED HEALTH CARE EDUCATION/TRAINING PROGRAM

## 2022-04-19 PROCEDURE — 3080F PR MOST RECENT DIASTOLIC BLOOD PRESSURE >= 90 MM HG: ICD-10-PCS | Mod: CPTII,S$GLB,, | Performed by: STUDENT IN AN ORGANIZED HEALTH CARE EDUCATION/TRAINING PROGRAM

## 2022-04-19 PROCEDURE — 99999 PR PBB SHADOW E&M-EST. PATIENT-LVL III: ICD-10-PCS | Mod: PBBFAC,,, | Performed by: STUDENT IN AN ORGANIZED HEALTH CARE EDUCATION/TRAINING PROGRAM

## 2022-04-19 NOTE — PATIENT INSTRUCTIONS
We had a discussion about conservative management options for symptoms related to hemorrhoids. Irritation or itching can be treated with Lidocaine cream/ointment, over-the-counter Hydrocortisone suppositories, and warm baths or soaking. Bleeding often improves by reducing hard stools and straining - Fiber supplementation (Metamucil 20-30 grams daily) and hydration (1.5 - 2 L daily) are effective. For him specifically, I have recommended Miralax (capful) daily in a bottle of water to prevent constipation and improve bowel function. Regular exercise, avoiding constipating medications (narcotic pain medications), limiting alcohol and fatty foods, reduced time on the toilet and focusing on not straining can also be effective.

## 2022-07-12 ENCOUNTER — OFFICE VISIT (OUTPATIENT)
Dept: SURGERY | Facility: CLINIC | Age: 62
End: 2022-07-12
Payer: COMMERCIAL

## 2022-07-12 VITALS
BODY MASS INDEX: 36.37 KG/M2 | DIASTOLIC BLOOD PRESSURE: 96 MMHG | HEIGHT: 73 IN | HEART RATE: 95 BPM | SYSTOLIC BLOOD PRESSURE: 196 MMHG | WEIGHT: 274.38 LBS

## 2022-07-12 DIAGNOSIS — K64.8 INTERNAL HEMORRHOID, BLEEDING: Primary | ICD-10-CM

## 2022-07-12 PROCEDURE — 3008F PR BODY MASS INDEX (BMI) DOCUMENTED: ICD-10-PCS | Mod: CPTII,S$GLB,, | Performed by: STUDENT IN AN ORGANIZED HEALTH CARE EDUCATION/TRAINING PROGRAM

## 2022-07-12 PROCEDURE — 1159F MED LIST DOCD IN RCRD: CPT | Mod: CPTII,S$GLB,, | Performed by: STUDENT IN AN ORGANIZED HEALTH CARE EDUCATION/TRAINING PROGRAM

## 2022-07-12 PROCEDURE — 3080F DIAST BP >= 90 MM HG: CPT | Mod: CPTII,S$GLB,, | Performed by: STUDENT IN AN ORGANIZED HEALTH CARE EDUCATION/TRAINING PROGRAM

## 2022-07-12 PROCEDURE — 99212 OFFICE O/P EST SF 10 MIN: CPT | Mod: S$GLB,,, | Performed by: STUDENT IN AN ORGANIZED HEALTH CARE EDUCATION/TRAINING PROGRAM

## 2022-07-12 PROCEDURE — 99999 PR PBB SHADOW E&M-EST. PATIENT-LVL III: ICD-10-PCS | Mod: PBBFAC,,, | Performed by: STUDENT IN AN ORGANIZED HEALTH CARE EDUCATION/TRAINING PROGRAM

## 2022-07-12 PROCEDURE — 99213 OFFICE O/P EST LOW 20 MIN: CPT | Mod: PBBFAC | Performed by: STUDENT IN AN ORGANIZED HEALTH CARE EDUCATION/TRAINING PROGRAM

## 2022-07-12 PROCEDURE — 99999 PR PBB SHADOW E&M-EST. PATIENT-LVL III: CPT | Mod: PBBFAC,,, | Performed by: STUDENT IN AN ORGANIZED HEALTH CARE EDUCATION/TRAINING PROGRAM

## 2022-07-12 PROCEDURE — 3080F PR MOST RECENT DIASTOLIC BLOOD PRESSURE >= 90 MM HG: ICD-10-PCS | Mod: CPTII,S$GLB,, | Performed by: STUDENT IN AN ORGANIZED HEALTH CARE EDUCATION/TRAINING PROGRAM

## 2022-07-12 PROCEDURE — 1159F PR MEDICATION LIST DOCUMENTED IN MEDICAL RECORD: ICD-10-PCS | Mod: CPTII,S$GLB,, | Performed by: STUDENT IN AN ORGANIZED HEALTH CARE EDUCATION/TRAINING PROGRAM

## 2022-07-12 PROCEDURE — 3008F BODY MASS INDEX DOCD: CPT | Mod: CPTII,S$GLB,, | Performed by: STUDENT IN AN ORGANIZED HEALTH CARE EDUCATION/TRAINING PROGRAM

## 2022-07-12 PROCEDURE — 3077F SYST BP >= 140 MM HG: CPT | Mod: CPTII,S$GLB,, | Performed by: STUDENT IN AN ORGANIZED HEALTH CARE EDUCATION/TRAINING PROGRAM

## 2022-07-12 PROCEDURE — 3077F PR MOST RECENT SYSTOLIC BLOOD PRESSURE >= 140 MM HG: ICD-10-PCS | Mod: CPTII,S$GLB,, | Performed by: STUDENT IN AN ORGANIZED HEALTH CARE EDUCATION/TRAINING PROGRAM

## 2022-07-12 PROCEDURE — 99212 PR OFFICE/OUTPT VISIT, EST, LEVL II, 10-19 MIN: ICD-10-PCS | Mod: S$GLB,,, | Performed by: STUDENT IN AN ORGANIZED HEALTH CARE EDUCATION/TRAINING PROGRAM

## 2022-07-12 NOTE — PROGRESS NOTES
"[facility]  Colon and Rectal Surgery    [address]  [place], [place]  Tel: [phone]  Fax: [phone]  [email or web address]   MD Maikel Garcia MD Brian Kann, MD W. Forrest Johnston, MD Matthew Giglia, MD Jennifer Paruch, MD William Kethman, MD Danielle Kay, MD     Patient name: Bhupinder Garces   YOB: 1960   MRN: [id number]  Date of visit: 07/12/2022    It was a pleasure seeing Mr. Garces in the Colon and Rectal Surgery clinic here at [facility].     As you know, Mr. Garces is a 61 year old man with a history of BPH who presents for evaluation of hemorrhoidal disease. He was seen previously by Ms. Nguyen - described intermittent bleeding with bowel movements with some prolapse of tissue and pruritis. He has had RBL approximately 20 years ago. He did note some straining and prolonged time on the toilet. He was not taking fiber or stool softeners at that time. He continues to have constipation and he strains to go to the bathroom. He still is not taking stool softeners or fiber because he didn't know if they were safe. He has no family history of CRC.    Last Colonoscopy completed on 5/7/2019  - One 4 mm polyp in the transverse colon, removed with a jumbo cold forceps. Resected and retrieved.   - The examination was otherwise normal  - Repeat in 10 years    7/12/2022  Follow-up for hemorrhoidal disease. He initially did well with Dulcolax and focusing on not straining/pushing. He had not had bleeding until recently when he strained when having a bowel movement and had a self-limited episode of blood in his stool.    The patient was informed of the availability of a certified  without charge. A certified  was not necessary for this visit.    Physical Examination  BP (!) 192/101 (BP Location: Left arm, Patient Position: Sitting, BP Method: Large (Automatic))   Pulse 95   Ht 6' 1" (1.854 m)   Wt 124.5 kg (274 " lb 6.4 oz)   BMI 36.20 kg/m²      A chaperone was present for the physical examination.    Constitutional: well developed, no cough, no dyspnea, alert, and no acute distress      Deferred MARGO/anoscopy due to patient preference    Assessment and Plan of Care    Thank you again for referring Mr. Garces to my care. In summary, Mr. Garces is a 61 year old man presenting with hemorrhoidal disease. We discussed treatment options and have provided the following recommendations:    1. We had a discussion about conservative management options for symptoms related to hemorrhoids. Bleeding often improves by reducing hard stools and straining - Fiber supplementation (Metamucil 20-30 grams daily) and hydration (1.5 - 2 L daily) are effective. For him specifically, I have recommended Miralax (capful) daily in a bottle of water to prevent constipation and improve bowel function. Regular exercise, avoiding constipating medications (narcotic pain medications), limiting alcohol and fatty foods, reduced time on the toilet and focusing on not straining can also be effective.  2. Follow-up in 6 months - symptoms improved when taking stool softeners regularly, recommended continuing this, if symptoms continue to recur will repeat colonoscopy    Please do not hesitate to contact me if you have any questions.      Celso Garcia MD - Staff Surgeon  Department of Colon & Rectal Surgery  [facility]

## 2022-08-05 ENCOUNTER — HOSPITAL ENCOUNTER (EMERGENCY)
Facility: HOSPITAL | Age: 62
Discharge: HOME | End: 2022-08-05
Attending: EMERGENCY MEDICINE
Payer: MEDICAID

## 2022-08-05 VITALS
SYSTOLIC BLOOD PRESSURE: 158 MMHG | TEMPERATURE: 98 F | DIASTOLIC BLOOD PRESSURE: 77 MMHG | RESPIRATION RATE: 17 BRPM | WEIGHT: 277 LBS | HEART RATE: 71 BPM | OXYGEN SATURATION: 98 % | BODY MASS INDEX: 36.55 KG/M2

## 2022-08-05 DIAGNOSIS — R42 DIZZINESS: ICD-10-CM

## 2022-08-05 DIAGNOSIS — I10 ELEVATED BLOOD PRESSURE READING WITH DIAGNOSIS OF HYPERTENSION: ICD-10-CM

## 2022-08-05 DIAGNOSIS — R42 VERTIGO: Primary | ICD-10-CM

## 2022-08-05 LAB
ALBUMIN SERPL BCP-MCNC: 4.4 G/DL (ref 3.5–5.2)
ALP SERPL-CCNC: 63 U/L (ref 38–126)
ALT SERPL W/O P-5'-P-CCNC: 33 U/L (ref 10–44)
AMORPH CRY UR QL COMP ASSIST: ABNORMAL
AMPHET+METHAMPHET UR QL: NEGATIVE
ANION GAP SERPL CALC-SCNC: 5 MMOL/L (ref 8–16)
APTT BLDCRRT: 22.4 SEC (ref 21–32)
AST SERPL-CCNC: 62 U/L (ref 15–46)
BACTERIA #/AREA URNS AUTO: ABNORMAL /HPF
BARBITURATES UR QL SCN>200 NG/ML: NEGATIVE
BASOPHILS # BLD AUTO: 0.03 K/UL (ref 0–0.2)
BASOPHILS NFR BLD: 0.4 % (ref 0–1.9)
BENZODIAZ UR QL SCN>200 NG/ML: NEGATIVE
BILIRUB SERPL-MCNC: 1.1 MG/DL (ref 0.1–1)
BILIRUB UR QL STRIP: NEGATIVE
BZE UR QL SCN: NEGATIVE
CALCIUM SERPL-MCNC: 9 MG/DL (ref 8.7–10.5)
CANNABINOIDS UR QL SCN: NEGATIVE
CHLORIDE SERPL-SCNC: 102 MMOL/L (ref 95–110)
CLARITY UR REFRACT.AUTO: ABNORMAL
CO2 SERPL-SCNC: 27 MMOL/L (ref 23–29)
COLOR UR AUTO: YELLOW
CREAT SERPL-MCNC: 1.25 MG/DL (ref 0.5–1.4)
CREAT UR-MCNC: 170.5 MG/DL (ref 23–375)
DIFFERENTIAL METHOD: ABNORMAL
EOSINOPHIL # BLD AUTO: 0.1 K/UL (ref 0–0.5)
EOSINOPHIL NFR BLD: 1.3 % (ref 0–8)
ERYTHROCYTE [DISTWIDTH] IN BLOOD BY AUTOMATED COUNT: 13 % (ref 11.5–14.5)
EST. GFR  (NO RACE VARIABLE): >60 ML/MIN/1.73 M^2
GLUCOSE SERPL-MCNC: 135 MG/DL (ref 70–110)
GLUCOSE UR QL STRIP: NEGATIVE
HCT VFR BLD AUTO: 39.1 % (ref 40–54)
HGB BLD-MCNC: 12.8 G/DL (ref 14–18)
HGB UR QL STRIP: NEGATIVE
IMM GRANULOCYTES # BLD AUTO: 0.01 K/UL (ref 0–0.04)
IMM GRANULOCYTES NFR BLD AUTO: 0.1 % (ref 0–0.5)
INR PPP: 1 (ref 0.8–1.2)
KETONES UR QL STRIP: NEGATIVE
LEUKOCYTE ESTERASE UR QL STRIP: NEGATIVE
LYMPHOCYTES # BLD AUTO: 4.1 K/UL (ref 1–4.8)
LYMPHOCYTES NFR BLD: 58.7 % (ref 18–48)
MAGNESIUM SERPL-MCNC: 1.8 MG/DL (ref 1.6–2.6)
MCH RBC QN AUTO: 27.5 PG (ref 27–31)
MCHC RBC AUTO-ENTMCNC: 32.7 G/DL (ref 32–36)
MCV RBC AUTO: 84 FL (ref 82–98)
METHADONE UR QL SCN>300 NG/ML: NEGATIVE
MICROSCOPIC COMMENT: ABNORMAL
MONOCYTES # BLD AUTO: 0.5 K/UL (ref 0.3–1)
MONOCYTES NFR BLD: 6.7 % (ref 4–15)
NEUTROPHILS # BLD AUTO: 2.3 K/UL (ref 1.8–7.7)
NEUTROPHILS NFR BLD: 32.8 % (ref 38–73)
NITRITE UR QL STRIP: NEGATIVE
NRBC BLD-RTO: 0 /100 WBC
OPIATES UR QL SCN: NEGATIVE
PCP UR QL SCN>25 NG/ML: NEGATIVE
PH UR STRIP: 8.5 [PH] (ref 5–8)
PLATELET # BLD AUTO: 273 K/UL (ref 150–450)
PMV BLD AUTO: 10.2 FL (ref 9.2–12.9)
POTASSIUM SERPL-SCNC: 3.8 MMOL/L (ref 3.5–5.1)
PROT SERPL-MCNC: 7.5 G/DL (ref 6–8.4)
PROT UR QL STRIP: NEGATIVE
PROTHROMBIN TIME: 11.1 SEC (ref 9–12.5)
RBC # BLD AUTO: 4.66 M/UL (ref 4.6–6.2)
RBC #/AREA URNS AUTO: 1 /HPF (ref 0–4)
SODIUM SERPL-SCNC: 134 MMOL/L (ref 136–145)
SP GR UR STRIP: 1.01 (ref 1–1.03)
TOXICOLOGY INFORMATION: NORMAL
TROPONIN I SERPL-MCNC: <0.012 NG/ML (ref 0.01–0.03)
URN SPEC COLLECT METH UR: ABNORMAL
UROBILINOGEN UR STRIP-ACNC: 1 EU/DL
UUN UR-MCNC: 12 MG/DL (ref 2–20)
WBC # BLD AUTO: 7.02 K/UL (ref 3.9–12.7)
WBC #/AREA URNS AUTO: 2 /HPF (ref 0–5)

## 2022-08-05 PROCEDURE — 25000003 PHARM REV CODE 250: Mod: ER | Performed by: EMERGENCY MEDICINE

## 2022-08-05 PROCEDURE — 93010 EKG 12-LEAD: ICD-10-PCS | Mod: ,,, | Performed by: INTERNAL MEDICINE

## 2022-08-05 PROCEDURE — 93010 ELECTROCARDIOGRAM REPORT: CPT | Mod: ,,, | Performed by: INTERNAL MEDICINE

## 2022-08-05 PROCEDURE — 63600175 PHARM REV CODE 636 W HCPCS: Mod: ER | Performed by: EMERGENCY MEDICINE

## 2022-08-05 PROCEDURE — 81000 URINALYSIS NONAUTO W/SCOPE: CPT | Mod: 59,ER | Performed by: EMERGENCY MEDICINE

## 2022-08-05 PROCEDURE — 84484 ASSAY OF TROPONIN QUANT: CPT | Mod: ER | Performed by: EMERGENCY MEDICINE

## 2022-08-05 PROCEDURE — 80307 DRUG TEST PRSMV CHEM ANLYZR: CPT | Mod: ER | Performed by: EMERGENCY MEDICINE

## 2022-08-05 PROCEDURE — 93005 ELECTROCARDIOGRAM TRACING: CPT | Mod: ER

## 2022-08-05 PROCEDURE — 85025 COMPLETE CBC W/AUTO DIFF WBC: CPT | Mod: ER | Performed by: EMERGENCY MEDICINE

## 2022-08-05 PROCEDURE — 85610 PROTHROMBIN TIME: CPT | Mod: ER | Performed by: EMERGENCY MEDICINE

## 2022-08-05 PROCEDURE — 96374 THER/PROPH/DIAG INJ IV PUSH: CPT | Mod: ER

## 2022-08-05 PROCEDURE — 99285 EMERGENCY DEPT VISIT HI MDM: CPT | Mod: 25,ER

## 2022-08-05 PROCEDURE — 83735 ASSAY OF MAGNESIUM: CPT | Mod: ER | Performed by: EMERGENCY MEDICINE

## 2022-08-05 PROCEDURE — 85730 THROMBOPLASTIN TIME PARTIAL: CPT | Mod: ER | Performed by: EMERGENCY MEDICINE

## 2022-08-05 PROCEDURE — 80053 COMPREHEN METABOLIC PANEL: CPT | Mod: ER | Performed by: EMERGENCY MEDICINE

## 2022-08-05 RX ORDER — MECLIZINE HCL 12.5 MG 12.5 MG/1
50 TABLET ORAL
Status: COMPLETED | OUTPATIENT
Start: 2022-08-05 | End: 2022-08-05

## 2022-08-05 RX ORDER — ONDANSETRON 2 MG/ML
4 INJECTION INTRAMUSCULAR; INTRAVENOUS
Status: COMPLETED | OUTPATIENT
Start: 2022-08-05 | End: 2022-08-05

## 2022-08-05 RX ORDER — MECLIZINE HYDROCHLORIDE 50 MG/1
50 TABLET ORAL 2 TIMES DAILY PRN
Qty: 20 TABLET | Refills: 0 | Status: SHIPPED | OUTPATIENT
Start: 2022-08-05 | End: 2023-08-22

## 2022-08-05 RX ADMIN — ONDANSETRON HYDROCHLORIDE 4 MG: 2 INJECTION, SOLUTION INTRAMUSCULAR; INTRAVENOUS at 11:08

## 2022-08-05 RX ADMIN — MECLIZINE 50 MG: 12.5 TABLET ORAL at 12:08

## 2022-08-05 NOTE — ED PROVIDER NOTES
Encounter Date: 8/5/2022       History     Chief Complaint   Patient presents with    dizziness and nausea     Pt states he has been dizzy for 2 hrs and is nauseated. Denies recent illness or trauma     This is a 61-year-old male with no significant medical history except prostatitis presenting to the emergency department for dizziness.  Patient works out regularly.  He states that he was at the gym last night doing cardio in the field of mild dizziness sensation.  He quit working out.  He was fine throughout the night.  Woke up this morning.  Dizziness started again 2 hours ago.  Worse with certain head movements.  Associated nausea.  He vomited once while he was here.  Generalized weakness.  The patient states he feels like the ground is about to come from under him.  He denies any headache preceding the dizziness.  No chest pain, shortness of breath, cough, fevers/chills or any other complaints.        Review of patient's allergies indicates:  No Known Allergies  Past Medical History:   Diagnosis Date    Prostatitis      Past Surgical History:   Procedure Laterality Date    COLONOSCOPY N/A 5/7/2019    Procedure: COLONOSCOPY possible RBL ;  Surgeon: Fermin Wade MD;  Location: [address];  Service: Colon and Rectal;  Laterality: N/A;     No family history on file.  Social History     Tobacco Use    Smoking status: Never Smoker    Smokeless tobacco: Never Used   Substance Use Topics    Alcohol use: No    Drug use: Never     Review of Systems   Constitutional: Negative for fever.   HENT: Negative for sore throat.    Respiratory: Negative for shortness of breath.    Cardiovascular: Negative for chest pain.   Gastrointestinal: Negative for nausea.   Genitourinary: Negative for dysuria.   Musculoskeletal: Negative for back pain.   Skin: Negative for rash.   Neurological: Negative for weakness.   Hematological: Does not bruise/bleed easily.   All other systems reviewed and are negative.      Physical  Exam     Initial Vitals [08/05/22 1105]   BP Pulse Resp Temp SpO2   (!) 168/85 78 17 98.8 °F (37.1 °C) 97 %      MAP       --         Physical Exam    Nursing note reviewed.  Constitutional: He appears well-developed and well-nourished.   HENT:   Head: Normocephalic and atraumatic.   Eyes: EOM are normal. Pupils are equal, round, and reactive to light.   Neck: Neck supple. No thyromegaly present.   Normal range of motion.  Cardiovascular: Normal rate, regular rhythm and normal heart sounds. Exam reveals no gallop and no friction rub.    No murmur heard.  Pulmonary/Chest: No respiratory distress. He has no wheezes. He has no rhonchi. He has no rales. He exhibits no tenderness.   Abdominal: Abdomen is soft. Bowel sounds are normal. He exhibits no distension and no mass. There is no abdominal tenderness. There is no rebound and no guarding.   Musculoskeletal:         General: No tenderness or edema. Normal range of motion.      Cervical back: Normal range of motion and neck supple.     Neurological: He is alert and oriented to person, place, and time. He has normal strength. GCS score is 15. GCS eye subscore is 4. GCS verbal subscore is 5. GCS motor subscore is 6.   No focal neurologic deficits.  NIH Stroke Scale = 0   Skin: Skin is warm and dry. Capillary refill takes less than 2 seconds. No rash and no abscess noted. No erythema. No pallor.   Psychiatric: He has a normal mood and affect.         ED Course   Procedures  Labs Reviewed   CBC W/ AUTO DIFFERENTIAL - Abnormal; Notable for the following components:       Result Value    Hemoglobin 12.8 (*)     Hematocrit 39.1 (*)     Gran % 32.8 (*)     Lymph % 58.7 (*)     All other components within normal limits   COMPREHENSIVE METABOLIC PANEL - Abnormal; Notable for the following components:    Sodium 134 (*)     Glucose 135 (*)     Total Bilirubin 1.1 (*)     AST 62 (*)     Anion Gap 5 (*)     All other components within normal limits   URINALYSIS, REFLEX TO URINE  CULTURE - Abnormal; Notable for the following components:    Appearance, UA Cloudy (*)     All other components within normal limits    Narrative:     Preferred Collection Type->Urine, Clean Catch  Specimen Source->Urine  Collection Type->Urine, Clean Catch   URINALYSIS MICROSCOPIC - Abnormal; Notable for the following components:    Amorphous, UA Many (*)     All other components within normal limits    Narrative:     Preferred Collection Type->Urine, Clean Catch  Specimen Source->Urine  Collection Type->Urine, Clean Catch   DRUG SCREEN PANEL, URINE EMERGENCY    Narrative:     Preferred Collection Type->Urine, Clean Catch  Specimen Source->Urine  Collection Type->Urine, Clean Catch   MAGNESIUM   PROTIME-INR   APTT   TROPONIN I     EKG Readings: (Independently Interpreted)   Rhythm: Normal Sinus Rhythm. Heart Rate: 76. Ectopy: No Ectopy. Conduction: Normal. ST Segments: Normal ST Segments. T Waves: Normal. Clinical Impression: Normal Sinus Rhythm     ECG Results          EKG 12-lead (Final result)  Result time 08/05/22 15:08:31    Final result by Interface, Lab In [facility] (08/05/22 15:08:31)                 Narrative:    Test Reason : R42,    Vent. Rate : 076 BPM     Atrial Rate : 076 BPM     P-R Int : 208 ms          QRS Dur : 084 ms      QT Int : 360 ms       P-R-T Axes : 059 063 042 degrees     QTc Int : 405 ms    Normal sinus rhythm  Normal ECG  When compared with ECG of 01-OCT-2007 09:23,  No significant change was found  Confirmed by Tai Upton MD ([id number]) on 8/5/2022 3:08:16 PM    Referred By: [email or web address]   SELF           Confirmed By:Tai Upton MD                            Imaging Results          CT Head Without Contrast (Final result)  Result time 08/05/22 12:03:37    Final result by Maikel Olvera MD (08/05/22 12:03:37)                 Impression:      No acute abnormality.    All CT scans at this facility use dose modulation, iterative reconstruction, and/or weight based dosing when appropriate  to reduce radiation dose to as low as reasonable achievable.      Electronically signed by: Maikel Olvera MD  Date:    08/05/2022  Time:    12:03             Narrative:    EXAMINATION:  CT HEAD WITHOUT CONTRAST    CLINICAL HISTORY:  Dizziness, persistent/recurrent, cardiac or vascular cause suspected;    TECHNIQUE:  Low dose axial CT images obtained throughout the head without intravenous contrast. Sagittal and coronal reconstructions were performed.    All CT scans at this facility use dose modulation, iterative reconstruction, and/or weight based dosing when appropriate to reduce radiation dose to as low as reasonable achievable.    COMPARISON:  None.    FINDINGS:  Intracranial compartment:    The brain parenchyma appears normal. No parenchymal mass, hemorrhage, edema or major vascular distribution infarct.    Ventricles and sulci are normal in size for age without evidence of hydrocephalus.    No extra-axial blood or fluid collections.    Skull/extracranial contents (limited evaluation): No fracture. Mastoid air cells and paranasal sinuses are essentially clear.                               X-Ray Chest 1 View (Final result)  Result time 08/05/22 12:00:14    Final result by Javy Paul MD (08/05/22 12:00:14)                 Impression:      No acute abnormality.      Electronically signed by: Javy Paul  Date:    08/05/2022  Time:    12:00             Narrative:    EXAMINATION:  XR CHEST 1 VIEW    CLINICAL HISTORY:  Dizziness and giddiness    TECHNIQUE:  Single frontal view of the chest was performed.    COMPARISON:  04/01/2020    FINDINGS:  The lungs are clear, with normal appearance of pulmonary vasculature and no pleural effusion or pneumothorax.    The cardiac silhouette is normal in size. The hilar and mediastinal contours are unremarkable.    Bones are intact.                                 Medications   ondansetron injection 4 mg (4 mg Intravenous Given 8/5/22 1146)   meclizine tablet 50 mg (50  mg Oral Given 8/5/22 1220)     Medical Decision Making:   Clinical Tests:   Lab Tests: Ordered and Reviewed  The following lab test(s) were unremarkable: CBC, Urinalysis, CMP, PT, PTT and Troponin  Radiological Study: Ordered and Reviewed  Medical Tests: Ordered and Reviewed  ED Management:  The patient is sitting comfortably laughing talking with family members at bedside.  He ambulated about the ED with no dizziness a normal gait.  He states his dizziness has resolved with the medication.  Unremarkable workup.  He had fluctuations in his blood pressure while here.  However, the patient does not have a diagnosis of hypertension.  Discussed the option to start blood pressure medication versus a wait and see approach.  The patient and wife decided that they did not want starting blood pressure medication at this time.  Patient was advised to keep a daily log of his blood pressure reading.  He was given a referral to primary care doctor.  The patient was advised to follow a low-salt low-fat diet.  He does endorse having a healthy diet already.  The patient exercises on a regular basis as well.  Patient was about to take the blood pressure log to primary care doctor for review.  He was given meclizine for his vertigo type symptoms.  Advised take the medication as prescribed.  Stressed ED return if any concerns.  The patient wife voiced understanding and happy agrees with plan of care.                      Clinical Impression:   Final diagnoses:  [R42] Dizziness  [R42] Vertigo (Primary)  [I10] Elevated blood pressure reading with diagnosis of hypertension          ED Disposition Condition    Discharge Stable        ED Prescriptions     Medication Sig Dispense Start Date End Date Auth. Provider    meclizine (ANTIVERT) 50 MG tablet Take 1 tablet (50 mg total) by mouth 2 (two) times daily as needed for Dizziness. 20 tablet 8/5/2022  Jhoan Lr MD        Follow-up Information     Follow up With Specialties Details  Why Contact Info    Rafia Valdez MD Internal Medicine Schedule an appointment as soon as possible for a visit today  [facility]  [address]  [facility] [id number]  [phone]             Jhoan Lr MD  08/05/22 1257

## 2022-08-05 NOTE — ED NOTES
"Patient ambulated around nurse station without assistance or difficulty denies dizziness just states his legs feel a little " wobbly" steady gait noted on ambulation.   "

## 2022-08-05 NOTE — DISCHARGE INSTRUCTIONS
Continue to exercise regularly as tolerated.  Follow a low-salt, low-fat diet.  Keep a daily log of your blood pressure readings.  I have given you the name of a primary care doctor to follow up with.  Call today to schedule an appointment.  Take the blood pressure log to the primary care doctor for review.  Take the medication as prescribed for your vertigo symptoms.  Return to the ED for worsening symptoms or any concerns.

## 2022-09-23 ENCOUNTER — HOSPITAL ENCOUNTER (EMERGENCY)
Facility: HOSPITAL | Age: 62
Discharge: HOME | End: 2022-09-23
Attending: EMERGENCY MEDICINE
Payer: MEDICAID

## 2022-09-23 VITALS
WEIGHT: 276 LBS | HEART RATE: 60 BPM | DIASTOLIC BLOOD PRESSURE: 80 MMHG | BODY MASS INDEX: 36.58 KG/M2 | HEIGHT: 73 IN | RESPIRATION RATE: 16 BRPM | SYSTOLIC BLOOD PRESSURE: 150 MMHG | OXYGEN SATURATION: 97 % | TEMPERATURE: 98 F

## 2022-09-23 DIAGNOSIS — R07.9 CHEST PAIN, UNSPECIFIED TYPE: Primary | ICD-10-CM

## 2022-09-23 DIAGNOSIS — R07.9 CHEST PAIN: ICD-10-CM

## 2022-09-23 LAB
ALBUMIN SERPL BCP-MCNC: 4.3 G/DL (ref 3.5–5.2)
ALP SERPL-CCNC: 56 U/L (ref 38–126)
ALT SERPL W/O P-5'-P-CCNC: 36 U/L (ref 10–44)
ANION GAP SERPL CALC-SCNC: 9 MMOL/L (ref 8–16)
AST SERPL-CCNC: 53 U/L (ref 15–46)
BASOPHILS # BLD AUTO: 0.01 K/UL (ref 0–0.2)
BASOPHILS NFR BLD: 0.1 % (ref 0–1.9)
BILIRUB SERPL-MCNC: 1 MG/DL (ref 0.1–1)
CALCIUM SERPL-MCNC: 8.8 MG/DL (ref 8.7–10.5)
CHLORIDE SERPL-SCNC: 103 MMOL/L (ref 95–110)
CO2 SERPL-SCNC: 24 MMOL/L (ref 23–29)
CREAT SERPL-MCNC: 1.22 MG/DL (ref 0.5–1.4)
DIFFERENTIAL METHOD: ABNORMAL
EOSINOPHIL # BLD AUTO: 0 K/UL (ref 0–0.5)
EOSINOPHIL NFR BLD: 0 % (ref 0–8)
ERYTHROCYTE [DISTWIDTH] IN BLOOD BY AUTOMATED COUNT: 14 % (ref 11.5–14.5)
EST. GFR  (NO RACE VARIABLE): >60 ML/MIN/1.73 M^2
GLUCOSE SERPL-MCNC: 135 MG/DL (ref 70–110)
HCT VFR BLD AUTO: 37.6 % (ref 40–54)
HGB BLD-MCNC: 12.4 G/DL (ref 14–18)
IMM GRANULOCYTES # BLD AUTO: 0.05 K/UL (ref 0–0.04)
IMM GRANULOCYTES NFR BLD AUTO: 0.3 % (ref 0–0.5)
LYMPHOCYTES # BLD AUTO: 2.1 K/UL (ref 1–4.8)
LYMPHOCYTES NFR BLD: 13.8 % (ref 18–48)
MAGNESIUM SERPL-MCNC: 2 MG/DL (ref 1.6–2.6)
MCH RBC QN AUTO: 28.1 PG (ref 27–31)
MCHC RBC AUTO-ENTMCNC: 33 G/DL (ref 32–36)
MCV RBC AUTO: 85 FL (ref 82–98)
MONOCYTES # BLD AUTO: 0.8 K/UL (ref 0.3–1)
MONOCYTES NFR BLD: 5.3 % (ref 4–15)
NEUTROPHILS # BLD AUTO: 12.1 K/UL (ref 1.8–7.7)
NEUTROPHILS NFR BLD: 80.5 % (ref 38–73)
NRBC BLD-RTO: 0 /100 WBC
PLATELET # BLD AUTO: 281 K/UL (ref 150–450)
PMV BLD AUTO: 10.7 FL (ref 9.2–12.9)
POTASSIUM SERPL-SCNC: 4.1 MMOL/L (ref 3.5–5.1)
PROT SERPL-MCNC: 7.3 G/DL (ref 6–8.4)
RBC # BLD AUTO: 4.42 M/UL (ref 4.6–6.2)
SODIUM SERPL-SCNC: 136 MMOL/L (ref 136–145)
TROPONIN ISTAT: <0.02 NG/ML (ref 0–0.08)
TROPONIN ISTAT: <0.02 NG/ML (ref 0–0.08)
UUN UR-MCNC: 23 MG/DL (ref 2–20)
WBC # BLD AUTO: 15.02 K/UL (ref 3.9–12.7)

## 2022-09-23 PROCEDURE — 83735 ASSAY OF MAGNESIUM: CPT | Mod: ER | Performed by: EMERGENCY MEDICINE

## 2022-09-23 PROCEDURE — 99285 EMERGENCY DEPT VISIT HI MDM: CPT | Mod: 25,ER

## 2022-09-23 PROCEDURE — 80053 COMPREHEN METABOLIC PANEL: CPT | Mod: ER | Performed by: EMERGENCY MEDICINE

## 2022-09-23 PROCEDURE — 84484 ASSAY OF TROPONIN QUANT: CPT | Mod: ER | Performed by: EMERGENCY MEDICINE

## 2022-09-23 PROCEDURE — 93010 ELECTROCARDIOGRAM REPORT: CPT | Mod: ,,, | Performed by: INTERNAL MEDICINE

## 2022-09-23 PROCEDURE — 85025 COMPLETE CBC W/AUTO DIFF WBC: CPT | Mod: ER | Performed by: EMERGENCY MEDICINE

## 2022-09-23 PROCEDURE — 93005 ELECTROCARDIOGRAM TRACING: CPT | Mod: ER

## 2022-09-23 PROCEDURE — 93010 EKG 12-LEAD: ICD-10-PCS | Mod: ,,, | Performed by: INTERNAL MEDICINE

## 2022-09-23 NOTE — DISCHARGE INSTRUCTIONS
Mr. Garces,    Thank you for letting me care for you today! It was nice meeting you, and I hope you feel better soon.   If you would like access to your chart and what was done today please utilize the [facility].   Please come back to [facility] for all of your future medical needs.    Our goal in the emergency department is to always give you outstanding care and exceptional service. You may receive a survey by mail or e-mail in the next week regarding your experience in our ED. We would greatly appreciate you completing and returning the survey. Your feedback provides us with a way to recognize our staff who give very good care and it helps us learn how to improve when your experience was below our aspiration of excellence.     Sincerely,    Stpehan Madison MD  Board Certified Emergency Physician

## 2022-09-24 NOTE — ED PROVIDER NOTES
Encounter Date: 9/23/2022       History     Chief Complaint   Patient presents with    Dizziness    Chest Pain     I was having chest pain since yesterday morning and was dizzy  with nausea. I took a Zofran yesterday and it got better. I was belching a lot yesterday. I am more tired that normal. It was a light pain on the left side my chest and relieved when I turned on my right side     62 yo man that presents with discomfort in the chest and belching which started yesterday after eating some shrimp. He took zofran this morning which improved his symptoms and now they have resolved but was concerned so wanted to be checked out. Denies previous illness, fevers, chills, trauma or injury. Nothing like this in the past he can recall.     Review of patient's allergies indicates:  No Known Allergies  Past Medical History:   Diagnosis Date    Prostatitis      Past Surgical History:   Procedure Laterality Date    COLONOSCOPY N/A 5/7/2019    Procedure: COLONOSCOPY possible RBL ;  Surgeon: Fermin Wade MD;  Location: [facility] ([address] [place] [address]);  Service: Colon and Rectal;  Laterality: N/A;     History reviewed. No pertinent family history.  Social History     Tobacco Use    Smoking status: Never    Smokeless tobacco: Never   Substance Use Topics    Alcohol use: No    Drug use: Never     Review of Systems  Constitutional-no fever  HEENT-no congestion  Eyes-no redness  Respiratory-no shortness of breath  Cardio-+ chest pain  GI-no abdominal pain, + belching  Endocrine-no cold intolerance  -no difficulty urinating  MSK-no myalgias  Skin-no rashes  Allergy-no environmental allergy  Neurologic-, no headache  Hematology-no swollen nodes  Behavioral-no confusion   Physical Exam     Initial Vitals [09/23/22 0905]   BP Pulse Resp Temp SpO2   (!) 168/91 88 15 98.1 °F (36.7 °C) 99 %      MAP       --         Physical Exam  Constitutional: Well appearing, no distress.  Eyes: Conjunctivae normal.  ENT       Head: Normocephalic,  atraumatic.       Nose: Normal external appearance        Mouth/Throat: no strigulous respirations   Hematological/Lymphatic/Immunilogical: no visible lymphadenopathy   Cardiovascular: Normal rate,   Respiratory: Normal respiratory effort.   Gastrointestinal: non distended   Musculoskeletal: Normal range of motion in all extremities. No obvious deformities or swelling.  Neurologic: Alert, oriented. Normal speech and language. No gross focal neurologic deficits are appreciated.  Skin: Skin is warm, dry. No rash noted.  Psychiatric: Mood and affect are normal.    ED Course   Procedures  Labs Reviewed   CBC W/ AUTO DIFFERENTIAL - Abnormal; Notable for the following components:       Result Value    WBC 15.02 (*)     RBC 4.42 (*)     Hemoglobin 12.4 (*)     Hematocrit 37.6 (*)     Gran # (ANC) 12.1 (*)     Immature Grans (Abs) 0.05 (*)     Gran % 80.5 (*)     Lymph % 13.8 (*)     All other components within normal limits   COMPREHENSIVE METABOLIC PANEL - Abnormal; Notable for the following components:    Glucose 135 (*)     BUN 23 (*)     AST 53 (*)     All other components within normal limits   MAGNESIUM   TROPONIN I   TROPONIN I        ECG Results              EKG 12-lead (Preliminary result)  Result time 09/23/22 09:15:56      ED Interpretation by Stephan Madison MD (09/23/22 09:15:56, [facility] - Emergency Dept, Emergency Medicine)    My EKG interpretation, sinus rhythm, 85 beats per minute, normal axis, no ST segment changes, when compared to previous EKG 08/05/2022 no appreciable changes                                  Imaging Results              X-Ray Chest AP Portable (Final result)  Result time 09/23/22 09:54:31      Final result by Crow Mike Jr., MD (09/23/22 09:54:31)                   Impression:      No acute findings or interval change.      Electronically signed by: Crow Mike Jr., MD  Date:    09/23/2022  Time:    09:54               Narrative:    EXAMINATION:  XR CHEST AP  PORTABLE    CLINICAL HISTORY:  Chest pain, unspecified    COMPARISON:  Prior from 08/05/2022.    FINDINGS:  Lungs are clear.  No pleural fluid or pneumothorax.  Heart size within normal limits.  No significant bony findings.                                       Medications - No data to display  Medical Decision Making:   History:   Old Medical Records: I decided to obtain old medical records.  Old Records Summarized: records from clinic visits.  Differential Diagnosis:   MI, pneumoina, reflux, pancreatitis  Clinical Tests:   Lab Tests: Ordered and Reviewed  Radiological Study: Ordered and Reviewed  Medical Tests: Ordered and Reviewed                        Clinical Impression:   Final diagnoses:  [R07.9] Chest pain  [R07.9] Chest pain, unspecified type (Primary)        ED Disposition Condition    Discharge Stable          ED Prescriptions    None       Follow-up Information       Follow up With Specialties Details Why Contact [facility] - Emergency Dept Emergency Medicine Go to  As needed [address] [facility] [address] [phone]             Stephan Madison MD  09/24/22 4828

## 2022-10-04 ENCOUNTER — PATIENT MESSAGE (OUTPATIENT)
Dept: ORTHOPEDICS | Facility: CLINIC | Age: 62
End: 2022-10-04
Payer: COMMERCIAL

## 2022-10-06 ENCOUNTER — OFFICE VISIT (OUTPATIENT)
Dept: ORTHOPEDICS | Facility: CLINIC | Age: 62
End: 2022-10-06
Payer: COMMERCIAL

## 2022-10-06 ENCOUNTER — HOSPITAL ENCOUNTER (OUTPATIENT)
Dept: RADIOLOGY | Facility: HOSPITAL | Age: 62
Discharge: HOME | End: 2022-10-06
Attending: ORTHOPAEDIC SURGERY
Payer: COMMERCIAL

## 2022-10-06 VITALS — HEIGHT: 73 IN | BODY MASS INDEX: 36.58 KG/M2 | WEIGHT: 276 LBS

## 2022-10-06 DIAGNOSIS — M25.561 RIGHT KNEE PAIN, UNSPECIFIED CHRONICITY: ICD-10-CM

## 2022-10-06 DIAGNOSIS — M25.561 RIGHT KNEE PAIN, UNSPECIFIED CHRONICITY: Primary | ICD-10-CM

## 2022-10-06 DIAGNOSIS — M17.11 PRIMARY OSTEOARTHRITIS OF RIGHT KNEE: ICD-10-CM

## 2022-10-06 PROCEDURE — 73562 XR KNEE ORTHO RIGHT WITH FLEXION: ICD-10-PCS | Mod: 26,LT,, | Performed by: RADIOLOGY

## 2022-10-06 PROCEDURE — 3008F BODY MASS INDEX DOCD: CPT | Mod: CPTII,S$GLB,, | Performed by: ORTHOPAEDIC SURGERY

## 2022-10-06 PROCEDURE — 1159F PR MEDICATION LIST DOCUMENTED IN MEDICAL RECORD: ICD-10-PCS | Mod: CPTII,S$GLB,, | Performed by: ORTHOPAEDIC SURGERY

## 2022-10-06 PROCEDURE — 99213 OFFICE O/P EST LOW 20 MIN: CPT | Mod: S$GLB,,, | Performed by: ORTHOPAEDIC SURGERY

## 2022-10-06 PROCEDURE — 73562 X-RAY EXAM OF KNEE 3: CPT | Mod: 26,LT,, | Performed by: RADIOLOGY

## 2022-10-06 PROCEDURE — 73564 XR KNEE ORTHO RIGHT WITH FLEXION: ICD-10-PCS | Mod: 26,RT,, | Performed by: RADIOLOGY

## 2022-10-06 PROCEDURE — 1159F MED LIST DOCD IN RCRD: CPT | Mod: CPTII,S$GLB,, | Performed by: ORTHOPAEDIC SURGERY

## 2022-10-06 PROCEDURE — 99999 PR PBB SHADOW E&M-EST. PATIENT-LVL III: CPT | Mod: PBBFAC,,, | Performed by: ORTHOPAEDIC SURGERY

## 2022-10-06 PROCEDURE — 1160F PR REVIEW ALL MEDS BY PRESCRIBER/CLIN PHARMACIST DOCUMENTED: ICD-10-PCS | Mod: CPTII,S$GLB,, | Performed by: ORTHOPAEDIC SURGERY

## 2022-10-06 PROCEDURE — 3008F PR BODY MASS INDEX (BMI) DOCUMENTED: ICD-10-PCS | Mod: CPTII,S$GLB,, | Performed by: ORTHOPAEDIC SURGERY

## 2022-10-06 PROCEDURE — 99999 PR PBB SHADOW E&M-EST. PATIENT-LVL III: ICD-10-PCS | Mod: PBBFAC,,, | Performed by: ORTHOPAEDIC SURGERY

## 2022-10-06 PROCEDURE — 73562 X-RAY EXAM OF KNEE 3: CPT | Mod: TC,59,LT

## 2022-10-06 PROCEDURE — 1160F RVW MEDS BY RX/DR IN RCRD: CPT | Mod: CPTII,S$GLB,, | Performed by: ORTHOPAEDIC SURGERY

## 2022-10-06 PROCEDURE — 73564 X-RAY EXAM KNEE 4 OR MORE: CPT | Mod: 26,RT,, | Performed by: RADIOLOGY

## 2022-10-06 PROCEDURE — 99213 PR OFFICE/OUTPT VISIT, EST, LEVL III, 20-29 MIN: ICD-10-PCS | Mod: S$GLB,,, | Performed by: ORTHOPAEDIC SURGERY

## 2022-10-06 RX ORDER — MELOXICAM 15 MG/1
15 TABLET ORAL DAILY
Qty: 30 TABLET | Refills: 1 | Status: SHIPPED | OUTPATIENT
Start: 2022-10-06 | End: 2023-08-22

## 2022-10-06 NOTE — PROGRESS NOTES
"Subjective:   Bhupinder Garces is a 61 y.o. male with history of two previous right knee arthroscopies, who presents for evaluation of bilateral knee pain (right worse than left).  Indicates his pain is primarily along the medial aspect of both knees, worsened with activity, and minimally relieved with ibuprofen and tylenol.  He recently had a corticosteroid injection of his right knee around two weeks ago, which only provided relief for around 7-10 days.  States he lifts weights daily but has been doing light-weight/high-rep workouts for his lower body exercises.  States his pain has progressed to where he is now limping.  He is currently a full-time  and ambulates without assistance at baseline.          Medications Ordered This Encounter   Medications    meloxicam (MOBIC) 15 MG tablet     Sig: Take 1 tablet (15 mg total) by mouth once daily.     Dispense:  30 tablet     Refill:  1        Past Medical History:   Diagnosis Date    Prostatitis        Past Surgical History:   Procedure Laterality Date    COLONOSCOPY N/A 5/7/2019    Procedure: COLONOSCOPY possible RBL ;  Surgeon: Fermin Wade MD;  Location: [facility] ([address] [place] [address]);  Service: Colon and Rectal;  Laterality: N/A;       No family history on file.    Social History     Socioeconomic History    Marital status:    Tobacco Use    Smoking status: Never    Smokeless tobacco: Never   Substance and Sexual Activity    Alcohol use: No    Drug use: Never       ROS:  A review of systems is updated and there are no reported vision changes, ear/nose/mouth/throat complaints,  chest pain, shortness of breath, abdominal pain, urological complaints, fevers or chills, psychiatric complaints. Musculoskeletal and neurologcial symptoms are as documented. All other systems are negative.    Objective:      Vitals:    10/06/22 1020   Weight: 125.2 kg (276 lb 0.3 oz)   Height: 6' 1" (1.854 m)     Physical Exam    MSK:  Right Lower Extremity  Inspection  - Skin intact " throughout, no open wounds  - No swelling  - No ecchymosis, erythema, or signs of cellulitis  Palpation  - TTP along medial joint line, otherwise non-ttp throughout  Range of motion  - AROM and PROM of the hip, ankle, and foot intact without pain  - ROM at knee limited due to pain      Flexion: 100 degrees      Extension: 0 degrees  Neurovascular  - TA/EHL/Gastroc/FHL assessed in isolation without deficit  - SILT throughout  - Compartments soft  - DP palpated   - Capillary Refill <3s  - Muscle tone normal      Imaging Review: I independently reviewed and interpreted the imaging and my findings are as follows:   Kellgren Vasile grade 3 osteoarthritis of the right knee primarily involving medial compartment    Assessment:     1. Right knee pain, unspecified chronicity    2. Primary osteoarthritis of right knee         Plan:     - Ambulatory referral to pain management placed  - Will follow up as needed for continued discussion of knee replacement surgery    The patient is prescribed meloxicam for short-term daily use, with intermittent as-needed use over the longer term.   Appropriate cautions are provided regarding possible GI and renal adverse effects.  The synergy of meloxicam and acetaminophen is described, and Tylenol Arthritis or the generic equivalent is recommended, as often as every 8 hours.  The patient is cautioned to avoid exceeding 3000 mg of Tylenol every 24 hours.  The record is reviewed regarding history renal or hepatic dysfunction, history of GI disease.     The patient's pathophysiology was explained in detail with reference to x-rays, models, other visual aids as appropriate.  Treatment options were discussed in detail.  Questions were invited and answered to the patient's satisfaction.

## 2022-10-08 PROBLEM — M17.11 PRIMARY OSTEOARTHRITIS OF RIGHT KNEE: Status: ACTIVE | Noted: 2022-10-08

## 2022-10-14 ENCOUNTER — TELEPHONE (OUTPATIENT)
Dept: PAIN MEDICINE | Facility: CLINIC | Age: 62
End: 2022-10-14
Payer: COMMERCIAL

## 2022-10-14 NOTE — TELEPHONE ENCOUNTER
This message is for patient in regards to his/her appointment 10/17/22 at 3:00 pm   With Dr. Debra Little MD.    [facility] Policy: For the safety of all patients and staff members.   During this visit we're informing all patients and visitors to wear face mask at all time here at [facility].  If you have any questions or concerns please contact [phone]

## 2022-11-01 ENCOUNTER — TELEPHONE (OUTPATIENT)
Dept: PAIN MEDICINE | Facility: CLINIC | Age: 62
End: 2022-11-01
Payer: COMMERCIAL

## 2022-11-01 NOTE — TELEPHONE ENCOUNTER
"This message is for patient in regards to his/her appointment 11/02/22 at 11:00a       [facility] Policy: For the safety of all patients and staff members.     Patient Visitor policy: Due to social distancing and limited seating staff are requesting patient to arrive to their schedule appointments on time.    Upon arriving to facility; patients are required to wear a face mask, if patient do not have a face mask one will be provided. Upon arriving patient we ask patients to contact clinic at this number [phone] to notify staff that they have arrived or they may do so by utilizing the Mobile checked in [facility](if patient have patient portal; clinical staff will send a message through there letting them know it's okay to proceed to their visit). Staff will give the patient the "okay" to come up or wait inside their vehicle until clinic is ready for patient to be seen by Dr. Noreen Chery MD. If you have any questions or concerns please contact [phone]      also inquired IPM within message.    [facility] providers and Mid-levels offer interventional, procedure--based options to treat chronic pain. The goal is to manage chronic pain by reducing pain frequency and intensity and address your functional goals for activities of daily living while simultaneously reducing or eliminating your reliance on medications. Please bring any records or images that you have from prior treatments for your pain. You will be presented with multi-modal treatment plan that may or may not include imaging, interventional procedures, physical/occupational/aqua therapy, pain creams, and non-narcotic pain medications used for the treatments of chronic pain.     Patient verbalized and confirmed appt  "

## 2022-11-02 ENCOUNTER — OFFICE VISIT (OUTPATIENT)
Dept: PAIN MEDICINE | Facility: CLINIC | Age: 62
End: 2022-11-02
Attending: ANESTHESIOLOGY
Payer: COMMERCIAL

## 2022-11-02 VITALS
RESPIRATION RATE: 19 BRPM | HEIGHT: 73 IN | WEIGHT: 279.13 LBS | DIASTOLIC BLOOD PRESSURE: 95 MMHG | TEMPERATURE: 98 F | HEART RATE: 77 BPM | SYSTOLIC BLOOD PRESSURE: 174 MMHG | BODY MASS INDEX: 36.99 KG/M2

## 2022-11-02 DIAGNOSIS — M25.561 CHRONIC PAIN OF BOTH KNEES: ICD-10-CM

## 2022-11-02 DIAGNOSIS — M17.0 BILATERAL PRIMARY OSTEOARTHRITIS OF KNEE: Primary | ICD-10-CM

## 2022-11-02 DIAGNOSIS — M25.562 CHRONIC PAIN OF BOTH KNEES: ICD-10-CM

## 2022-11-02 DIAGNOSIS — G89.29 CHRONIC PAIN OF BOTH KNEES: ICD-10-CM

## 2022-11-02 PROCEDURE — 1160F PR REVIEW ALL MEDS BY PRESCRIBER/CLIN PHARMACIST DOCUMENTED: ICD-10-PCS | Mod: CPTII,S$GLB,, | Performed by: ANESTHESIOLOGY

## 2022-11-02 PROCEDURE — 3077F PR MOST RECENT SYSTOLIC BLOOD PRESSURE >= 140 MM HG: ICD-10-PCS | Mod: CPTII,S$GLB,, | Performed by: ANESTHESIOLOGY

## 2022-11-02 PROCEDURE — 1159F PR MEDICATION LIST DOCUMENTED IN MEDICAL RECORD: ICD-10-PCS | Mod: CPTII,S$GLB,, | Performed by: ANESTHESIOLOGY

## 2022-11-02 PROCEDURE — 1160F RVW MEDS BY RX/DR IN RCRD: CPT | Mod: CPTII,S$GLB,, | Performed by: ANESTHESIOLOGY

## 2022-11-02 PROCEDURE — 99204 OFFICE O/P NEW MOD 45 MIN: CPT | Mod: S$GLB,,, | Performed by: ANESTHESIOLOGY

## 2022-11-02 PROCEDURE — 3077F SYST BP >= 140 MM HG: CPT | Mod: CPTII,S$GLB,, | Performed by: ANESTHESIOLOGY

## 2022-11-02 PROCEDURE — 99999 PR PBB SHADOW E&M-EST. PATIENT-LVL III: ICD-10-PCS | Mod: PBBFAC,,, | Performed by: ANESTHESIOLOGY

## 2022-11-02 PROCEDURE — 3008F PR BODY MASS INDEX (BMI) DOCUMENTED: ICD-10-PCS | Mod: CPTII,S$GLB,, | Performed by: ANESTHESIOLOGY

## 2022-11-02 PROCEDURE — 1159F MED LIST DOCD IN RCRD: CPT | Mod: CPTII,S$GLB,, | Performed by: ANESTHESIOLOGY

## 2022-11-02 PROCEDURE — 99204 PR OFFICE/OUTPT VISIT, NEW, LEVL IV, 45-59 MIN: ICD-10-PCS | Mod: S$GLB,,, | Performed by: ANESTHESIOLOGY

## 2022-11-02 PROCEDURE — 3008F BODY MASS INDEX DOCD: CPT | Mod: CPTII,S$GLB,, | Performed by: ANESTHESIOLOGY

## 2022-11-02 PROCEDURE — 99999 PR PBB SHADOW E&M-EST. PATIENT-LVL III: CPT | Mod: PBBFAC,,, | Performed by: ANESTHESIOLOGY

## 2022-11-02 PROCEDURE — 3080F DIAST BP >= 90 MM HG: CPT | Mod: CPTII,S$GLB,, | Performed by: ANESTHESIOLOGY

## 2022-11-02 PROCEDURE — 3080F PR MOST RECENT DIASTOLIC BLOOD PRESSURE >= 90 MM HG: ICD-10-PCS | Mod: CPTII,S$GLB,, | Performed by: ANESTHESIOLOGY

## 2022-11-02 NOTE — PROGRESS NOTES
Subjective:      Patient ID: Bhupinder Garces is a 61 y.o. male.    Chief Complaint: Knee Pain    Referred by: Self, Aaareferral     61 year old male presents today as a new consult referred from Orthopedics, Dr. Jimenez, for bilateral knee pain. Patient reports knee pain that has been present for many years. Gradually worsening over time. Localized to medial joint line. Worsened with ambulation and in the morning. Relieved with relative rest. No numbness or tingling. He did recently get a corticosteroid injection in his right knee with 7-10 days of relief. Patient would like to proceed with surgery and is not interested in non surgical options in the interim.         Interventional Pain History  9/2022 Right knee steroid injection   Past Medical History:   Diagnosis Date    Prostatitis        Past Surgical History:   Procedure Laterality Date    COLONOSCOPY N/A 5/7/2019    Procedure: COLONOSCOPY possible RBL ;  Surgeon: Fermin Wade MD;  Location: [facility] ([address] [place] [address]);  Service: Colon and Rectal;  Laterality: N/A;       Review of patient's allergies indicates:  No Known Allergies    Current Outpatient Medications   Medication Sig Dispense Refill    meloxicam (MOBIC) 15 MG tablet Take 1 tablet (15 mg total) by mouth once daily. 30 tablet 1    meclizine (ANTIVERT) 50 MG tablet Take 1 tablet (50 mg total) by mouth 2 (two) times daily as needed for Dizziness. (Patient not taking: Reported on 11/2/2022) 20 tablet 0     No current facility-administered medications for this visit.       History reviewed. No pertinent family history.    Social History     Socioeconomic History    Marital status:    Tobacco Use    Smoking status: Never    Smokeless tobacco: Never   Substance and Sexual Activity    Alcohol use: No    Drug use: Never           Review of Systems   Constitutional: Negative for chills, decreased appetite, fever, malaise/fatigue and night sweats.   HENT:  Negative for ear pain, hearing loss and sore throat.   "  Eyes:  Negative for blurred vision, pain and visual disturbance.   Cardiovascular:  Negative for chest pain and leg swelling.   Respiratory:  Negative for cough and shortness of breath.    Musculoskeletal:  Positive for arthritis, joint pain and joint swelling. Negative for back pain, falls, muscle weakness, myalgias and neck pain.   Gastrointestinal:  Negative for abdominal pain, bowel incontinence and dysphagia.   Genitourinary:  Positive for incomplete emptying. Negative for frequency and urgency.   Neurological:  Negative for headaches, light-headedness, loss of balance, numbness, paresthesias, sensory change and weakness.         Objective:   BP (!) 174/95   Pulse 77   Temp 97.9 °F (36.6 °C) (Oral)   Resp 19   Ht 6' 1.2" (1.859 m)   Wt 126.6 kg (279 lb 1.6 oz)   BMI 36.62 kg/m²   Pain Disability Index Review:  Last 3 PDI Scores 11/2/2022   Pain Disability Index (PDI) 70     Normocephalic.  Atraumatic.  Affect appropriate.  Breathing unlabored.  Extra ocular muscles intact.           General    Constitutional: He is oriented to person, place, and time. He appears well-developed and well-nourished.   HENT:   Head: Normocephalic and atraumatic.   Eyes: EOM are normal.   Cardiovascular:  Normal rate and regular rhythm.            Pulmonary/Chest: Effort normal and breath sounds normal.   Abdominal: Soft. He exhibits no distension. There is no abdominal tenderness.   Neurological: He is alert and oriented to person, place, and time. He has normal reflexes.   Psychiatric: He has a normal mood and affect. His behavior is normal. Judgment and thought content normal.           Right Knee Exam     Inspection   Swelling: absent  Deformity: absent    Tenderness   The patient is tender to palpation of the medial joint line.    Crepitus   The patient has crepitus of the medial joint line.    Range of Motion   Extension:  normal   Flexion:  normal     Tests   Meniscus   Diandra:  Medial - negative Lateral - " negative  Ligament Examination   Lachman: normal (-1 to 2mm)   PCL-Posterior Drawer: normal (0 to 2mm)     MCL - Valgus: normal (0 to 2mm)  LCL - Varus: normal    Other   Sensation: normal    Comments:  Medial pain with valgus stress    Left Knee Exam     Inspection   Deformity: absent    Tenderness   The patient tender to palpation of the medial joint line.    Crepitus   The patient has crepitus of the medial joint line.    Range of Motion   Extension:  normal   Flexion:  normal     Tests   Meniscus   Diandra:  Medial - negative Lateral - negative  Stability   Lachman: normal (-1 to 2mm)   PCL-Posterior Drawer: normal (0 to 2mm)  MCL - Valgus: normal (0 to 2mm)  LCL - Varus: normal (0 to 2mm)    Other   Sensation: normal    Comments:  Medial pain with valgus stress    Muscle Strength   Right Lower Extremity   Hip Abduction: 5/5   Quadriceps:  5/5   Hamstrin/5   Left Lower Extremity   Hip Abduction: 5/5   Quadriceps:  5/5   Hamstrin/5     Reflexes     Left Side  Achilles:  2+  Quadriceps:  2+    Right Side   Achilles:  2+  Quadriceps:  2+    Vascular Exam       Edema  Right Lower Leg: absent  Left Lower Leg: absent      Assessment:       Encounter Diagnoses   Name Primary?    Bilateral primary osteoarthritis of knee Yes    Chronic pain of both knees          Plan:   We discussed with the patient the assessment and recommendations. The following is the plan we agreed on:    - Patient declines non surgical interventions today.  - Patient would like to pursue surgical interventions for bilateral knee pain  - Discussed appropriate medication use. Recommended patient discontinue NSAID use. Recommend tylenol 500mg prn for pain. Instructed patient not to exceed 3000mg of tylenol in 24 hours.   - Patient to follow up with Dr. Jimenez to discuss knee replacement surgery        Bhupinder was seen today for knee pain.    Diagnoses and all orders for this visit:    Bilateral primary osteoarthritis of knee    Chronic pain  of both knees     Patrice Yanez MD  [employer] PM&R PGY-3     I have personally taken the history and examined this patient and agree with the resident's note as stated above.

## 2022-11-09 RX ORDER — TAMSULOSIN HYDROCHLORIDE 0.4 MG/1
1 CAPSULE ORAL DAILY
COMMUNITY
Start: 2022-10-02 | End: 2023-08-22 | Stop reason: SDUPTHER

## 2022-11-21 ENCOUNTER — TELEPHONE (OUTPATIENT)
Dept: ORTHOPEDICS | Facility: CLINIC | Age: 62
End: 2022-11-21
Payer: COMMERCIAL
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
## 2022-12-01 ENCOUNTER — HOSPITAL ENCOUNTER (OUTPATIENT)
Dept: RADIOLOGY | Facility: HOSPITAL | Age: 62
Discharge: HOME | End: 2022-12-01
Attending: ORTHOPAEDIC SURGERY
Payer: COMMERCIAL

## 2022-12-01 ENCOUNTER — OFFICE VISIT (OUTPATIENT)
Dept: ORTHOPEDICS | Facility: CLINIC | Age: 62
End: 2022-12-01
Payer: COMMERCIAL

## 2022-12-01 VITALS — BODY MASS INDEX: 37.36 KG/M2 | WEIGHT: 275.81 LBS | HEIGHT: 72 IN

## 2022-12-01 DIAGNOSIS — M17.11 PRIMARY OSTEOARTHRITIS OF RIGHT KNEE: ICD-10-CM

## 2022-12-01 DIAGNOSIS — M25.561 RIGHT KNEE PAIN, UNSPECIFIED CHRONICITY: ICD-10-CM

## 2022-12-01 DIAGNOSIS — M25.561 RIGHT KNEE PAIN, UNSPECIFIED CHRONICITY: Primary | ICD-10-CM

## 2022-12-01 PROCEDURE — 73562 X-RAY EXAM OF KNEE 3: CPT | Mod: TC,LT

## 2022-12-01 PROCEDURE — 20610 DRAIN/INJ JOINT/BURSA W/O US: CPT | Mod: RT,S$GLB,, | Performed by: ORTHOPAEDIC SURGERY

## 2022-12-01 PROCEDURE — 1160F RVW MEDS BY RX/DR IN RCRD: CPT | Mod: CPTII,S$GLB,, | Performed by: ORTHOPAEDIC SURGERY

## 2022-12-01 PROCEDURE — 99213 OFFICE O/P EST LOW 20 MIN: CPT | Mod: 25,S$GLB,, | Performed by: ORTHOPAEDIC SURGERY

## 2022-12-01 PROCEDURE — 1159F PR MEDICATION LIST DOCUMENTED IN MEDICAL RECORD: ICD-10-PCS | Mod: CPTII,S$GLB,, | Performed by: ORTHOPAEDIC SURGERY

## 2022-12-01 PROCEDURE — 1160F PR REVIEW ALL MEDS BY PRESCRIBER/CLIN PHARMACIST DOCUMENTED: ICD-10-PCS | Mod: CPTII,S$GLB,, | Performed by: ORTHOPAEDIC SURGERY

## 2022-12-01 PROCEDURE — 99213 PR OFFICE/OUTPT VISIT, EST, LEVL III, 20-29 MIN: ICD-10-PCS | Mod: 25,S$GLB,, | Performed by: ORTHOPAEDIC SURGERY

## 2022-12-01 PROCEDURE — 73564 X-RAY EXAM KNEE 4 OR MORE: CPT | Mod: 26,RT,, | Performed by: RADIOLOGY

## 2022-12-01 PROCEDURE — 20610 LARGE JOINT ASPIRATION/INJECTION: R KNEE: ICD-10-PCS | Mod: RT,S$GLB,, | Performed by: ORTHOPAEDIC SURGERY

## 2022-12-01 PROCEDURE — 73562 XR KNEE ORTHO RIGHT WITH FLEXION: ICD-10-PCS | Mod: 26,LT,, | Performed by: RADIOLOGY

## 2022-12-01 PROCEDURE — 99999 PR PBB SHADOW E&M-EST. PATIENT-LVL III: CPT | Mod: PBBFAC,,, | Performed by: ORTHOPAEDIC SURGERY

## 2022-12-01 PROCEDURE — 3008F PR BODY MASS INDEX (BMI) DOCUMENTED: ICD-10-PCS | Mod: CPTII,S$GLB,, | Performed by: ORTHOPAEDIC SURGERY

## 2022-12-01 PROCEDURE — 73562 X-RAY EXAM OF KNEE 3: CPT | Mod: 26,LT,, | Performed by: RADIOLOGY

## 2022-12-01 PROCEDURE — 3008F BODY MASS INDEX DOCD: CPT | Mod: CPTII,S$GLB,, | Performed by: ORTHOPAEDIC SURGERY

## 2022-12-01 PROCEDURE — 1159F MED LIST DOCD IN RCRD: CPT | Mod: CPTII,S$GLB,, | Performed by: ORTHOPAEDIC SURGERY

## 2022-12-01 PROCEDURE — 99999 PR PBB SHADOW E&M-EST. PATIENT-LVL III: ICD-10-PCS | Mod: PBBFAC,,, | Performed by: ORTHOPAEDIC SURGERY

## 2022-12-01 PROCEDURE — 73564 XR KNEE ORTHO RIGHT WITH FLEXION: ICD-10-PCS | Mod: 26,RT,, | Performed by: RADIOLOGY

## 2022-12-01 RX ADMIN — TRIAMCINOLONE ACETONIDE 40 MG: 40 INJECTION, SUSPENSION INTRA-ARTICULAR; INTRAMUSCULAR at 01:12
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
## 2022-12-10 RX ORDER — TRIAMCINOLONE ACETONIDE 40 MG/ML
40 INJECTION, SUSPENSION INTRA-ARTICULAR; INTRAMUSCULAR
Status: DISCONTINUED | OUTPATIENT
Start: 2022-12-01 | End: 2022-12-10 | Stop reason: HOSPADM

## 2022-12-10 NOTE — PROCEDURES
Large Joint Aspiration/Injection: R knee    Date/Time: 12/1/2022 1:15 PM  Performed by: Isak Jimenez MD  Authorized by: Isak Jimenez MD     Consent Done?:  Yes (Verbal)  Indications:  Pain  Site marked: the procedure site was marked    Timeout: prior to procedure the correct patient, procedure, and site was verified    Prep: patient was prepped and draped in usual sterile fashion      Local anesthesia used?: Yes    Local anesthetic:  Lidocaine 1% without epinephrine  Anesthetic total (ml):  5      Details:  Needle Size:  25 G  Ultrasonic Guidance for needle placement?: No    Approach:  Anterolateral  Location:  Knee  Site:  R knee  Medications:  40 mg triamcinolone acetonide 40 mg/mL  Patient tolerance:  Patient tolerated the procedure well with no immediate complications

## 2022-12-29 ENCOUNTER — TELEPHONE (OUTPATIENT)
Dept: SURGERY | Facility: CLINIC | Age: 62
End: 2022-12-29
Payer: COMMERCIAL

## 2023-01-23 ENCOUNTER — TELEPHONE (OUTPATIENT)
Dept: SURGERY | Facility: CLINIC | Age: 63
End: 2023-01-23
Payer: COMMERCIAL

## 2023-01-23 NOTE — TELEPHONE ENCOUNTER
Called pt back regarding his message to let him know that he has an OV and not a colonoscopy tomorrow with Dr. Garcia. Pt verbalized understanding to all.

## 2023-01-24 ENCOUNTER — OFFICE VISIT (OUTPATIENT)
Dept: SURGERY | Facility: CLINIC | Age: 63
End: 2023-01-24
Payer: COMMERCIAL

## 2023-01-24 VITALS
WEIGHT: 272.94 LBS | HEART RATE: 80 BPM | BODY MASS INDEX: 37.02 KG/M2 | DIASTOLIC BLOOD PRESSURE: 85 MMHG | SYSTOLIC BLOOD PRESSURE: 168 MMHG

## 2023-01-24 DIAGNOSIS — K64.8 INTERNAL HEMORRHOID, BLEEDING: Primary | ICD-10-CM

## 2023-01-24 PROCEDURE — 99214 PR OFFICE/OUTPT VISIT, EST, LEVL IV, 30-39 MIN: ICD-10-PCS | Mod: S$GLB,,, | Performed by: STUDENT IN AN ORGANIZED HEALTH CARE EDUCATION/TRAINING PROGRAM

## 2023-01-24 PROCEDURE — 99214 OFFICE O/P EST MOD 30 MIN: CPT | Mod: S$GLB,,, | Performed by: STUDENT IN AN ORGANIZED HEALTH CARE EDUCATION/TRAINING PROGRAM

## 2023-01-24 PROCEDURE — 99999 PR PBB SHADOW E&M-EST. PATIENT-LVL I: CPT | Mod: PBBFAC,,, | Performed by: STUDENT IN AN ORGANIZED HEALTH CARE EDUCATION/TRAINING PROGRAM

## 2023-01-24 PROCEDURE — 3008F BODY MASS INDEX DOCD: CPT | Mod: CPTII,S$GLB,, | Performed by: STUDENT IN AN ORGANIZED HEALTH CARE EDUCATION/TRAINING PROGRAM

## 2023-01-24 PROCEDURE — 3008F PR BODY MASS INDEX (BMI) DOCUMENTED: ICD-10-PCS | Mod: CPTII,S$GLB,, | Performed by: STUDENT IN AN ORGANIZED HEALTH CARE EDUCATION/TRAINING PROGRAM

## 2023-01-24 PROCEDURE — 99999 PR PBB SHADOW E&M-EST. PATIENT-LVL I: ICD-10-PCS | Mod: PBBFAC,,, | Performed by: STUDENT IN AN ORGANIZED HEALTH CARE EDUCATION/TRAINING PROGRAM

## 2023-01-24 NOTE — PROGRESS NOTES
[facility]  Colon and Rectal Surgery    [address]  [place], [place]  Tel: [phone]  Fax: [phone]  [email or web address]   MD Maikel Garcia MD Brian Kann, MD W. Forrest Johnston, MD Matthew Giglia, MD Jennifer Paruch, MD William Kethman, MD Danielle Kay, MD     Patient name: Bhupinder Garces   YOB: 1960   MRN: [id number]  Date of visit: 01/24/2023    It was a pleasure seeing Mr. Garces in the Colon and Rectal Surgery clinic here at [facility].     As you know, Mr. Garces is a 61 year old man with a history of BPH  who presents for evaluation of hemorrhoidal disease . He was seen previously by Ms. Nguyen - described intermittent bleeding with bowel movements with some prolapse of tissue and pruritis. He has had RBL approximately 20 years ago. He did note some straining and prolonged time on the toilet. He was not taking fiber or stool softeners at that time. He continues to have constipation and he strains to go to the bathroom. He still is not taking stool softeners or fiber because he didn't know if they were safe. He has no family history of CRC.    Last Colonoscopy completed on 5/7/2019  - One 4 mm polyp in the transverse colon, removed with a jumbo cold forceps. Resected and retrieved.   - The examination was otherwise normal  - Repeat in 10 years    7/12/2022  Follow-up for hemorrhoidal disease. He initially did well with Dulcolax and focusing on not straining/pushing. He had not had bleeding until recently when he strained when having a bowel movement and had a self-limited episode of blood in his stool.    1/24/2023  Here for follow-up of hemorrhoidal bleeding. He has not bled since his last visit. He is taking stool softeners regularly. He is not straining. He does take fiber everyday and this has helped.    The patient was informed of the availability of a certified  without charge. A certified   was not necessary for this visit.    Physical Examination  Pulse 80   Wt 123.8 kg (272 lb 14.9 oz)   BMI 37.02 kg/m²      A chaperone was present for the physical examination.    Constitutional: well developed, no cough, no dyspnea, alert, and no acute distress    Normal perianal exam, normal MARGO, no masses, deferred anoscopy    Assessment and Plan of Care    Thank you again for referring Mr. Garces to my care. In summary, Mr. Garces is a 61 year old man presenting with hemorrhoidal disease. We discussed treatment options and have provided the following recommendations:    We had a discussion about conservative management options for symptoms related to hemorrhoids. Bleeding often improves by reducing hard stools and straining - Fiber supplementation (Metamucil 20-30 grams daily) and hydration (1.5 - 2 L daily) are effective. For him specifically, I have recommended Miralax (capful) daily in a bottle of water to prevent constipation and improve bowel function. Regular exercise, avoiding constipating medications (narcotic pain medications), limiting alcohol and fatty foods, reduced time on the toilet and focusing on not straining can also be effective.  Due for colonoscopy in 2029 - discussed follow-up parameters, if continues to be an issue will perform early scope    Please do not hesitate to contact me if you have any questions.      Celso Garcia MD - Staff Surgeon  Department of Colon & Rectal Surgery  [facility]

## 2023-03-28 ENCOUNTER — PATIENT MESSAGE (OUTPATIENT)
Dept: RESEARCH | Facility: HOSPITAL | Age: 63
End: 2023-03-28
Payer: COMMERCIAL

## 2023-04-11 ENCOUNTER — PATIENT MESSAGE (OUTPATIENT)
Dept: RESEARCH | Facility: HOSPITAL | Age: 63
End: 2023-04-11
Payer: COMMERCIAL

## 2023-07-26 DIAGNOSIS — K64.8 INTERNAL HEMORRHOID, BLEEDING: ICD-10-CM

## 2023-07-26 RX ORDER — HYDROCORTISONE 25 MG/G
CREAM TOPICAL
Qty: 30 G | Refills: 1 | Status: SHIPPED | OUTPATIENT
Start: 2023-07-26

## 2023-08-09 DIAGNOSIS — R06.00 DYSPNEA: ICD-10-CM

## 2023-08-09 DIAGNOSIS — R06.09 CHRONIC DYSPNEA: Primary | ICD-10-CM

## 2023-08-16 ENCOUNTER — HOSPITAL ENCOUNTER (OUTPATIENT)
Dept: CARDIOLOGY | Facility: HOSPITAL | Age: 63
Discharge: HOME | End: 2023-08-16
Attending: FAMILY MEDICINE
Payer: COMMERCIAL

## 2023-08-16 VITALS — HEIGHT: 72 IN | WEIGHT: 272 LBS | BODY MASS INDEX: 36.84 KG/M2

## 2023-08-16 DIAGNOSIS — R06.09 CHRONIC DYSPNEA: ICD-10-CM

## 2023-08-16 LAB
ASCENDING AORTA: 3.13 CM
AV INDEX (PROSTH): 0.63
AV MEAN GRADIENT: 4 MMHG
AV PEAK GRADIENT: 7 MMHG
AV VALVE AREA BY VELOCITY RATIO: 2.3 CM²
AV VALVE AREA: 2.15 CM²
AV VELOCITY RATIO: 0.67
BSA FOR ECHO PROCEDURE: 2.5 M2
CV ECHO LV RWT: 0.4 CM
DOP CALC AO PEAK VEL: 1.31 M/S
DOP CALC AO VTI: 21.9 CM
DOP CALC LVOT AREA: 3.4 CM2
DOP CALC LVOT DIAMETER: 2.09 CM
DOP CALC LVOT PEAK VEL: 0.88 M/S
DOP CALC LVOT STROKE VOLUME: 46.98 CM3
DOP CALC MV VTI: 23 CM
DOP CALCLVOT PEAK VEL VTI: 13.7 CM
E WAVE DECELERATION TIME: 176.43 MSEC
E/A RATIO: 0.94
E/E' RATIO: 8.56 M/S
ECHO LV POSTERIOR WALL: 1.04 CM (ref 0.6–1.1)
EJECTION FRACTION: 65 %
FRACTIONAL SHORTENING: 42 % (ref 28–44)
INTERVENTRICULAR SEPTUM: 1.05 CM (ref 0.6–1.1)
IVC DIAMETER: 1.48 CM
IVRT: 83.73 MSEC
LA MAJOR: 4.83 CM
LA MINOR: 5.16 CM
LA WIDTH: 3.4 CM
LEFT ATRIUM SIZE: 3.87 CM
LEFT ATRIUM VOLUME INDEX MOD: 20.8 ML/M2
LEFT ATRIUM VOLUME INDEX: 23 ML/M2
LEFT ATRIUM VOLUME MOD: 50.5 CM3
LEFT ATRIUM VOLUME: 55.8 CM3
LEFT INTERNAL DIMENSION IN SYSTOLE: 3.02 CM (ref 2.1–4)
LEFT VENTRICLE DIASTOLIC VOLUME INDEX: 52.87 ML/M2
LEFT VENTRICLE DIASTOLIC VOLUME: 128.48 ML
LEFT VENTRICLE MASS INDEX: 84 G/M2
LEFT VENTRICLE SYSTOLIC VOLUME INDEX: 14.7 ML/M2
LEFT VENTRICLE SYSTOLIC VOLUME: 35.61 ML
LEFT VENTRICULAR INTERNAL DIMENSION IN DIASTOLE: 5.18 CM (ref 3.5–6)
LEFT VENTRICULAR MASS: 204.65 G
LV LATERAL E/E' RATIO: 8.56 M/S
LV SEPTAL E/E' RATIO: 8.56 M/S
LVOT MG: 1.96 MMHG
LVOT MV: 0.68 CM/S
MV MEAN GRADIENT: 1 MMHG
MV PEAK A VEL: 0.82 M/S
MV PEAK E VEL: 0.77 M/S
MV PEAK GRADIENT: 3 MMHG
MV STENOSIS PRESSURE HALF TIME: 51.16 MS
MV VALVE AREA BY CONTINUITY EQUATION: 2.04 CM2
MV VALVE AREA P 1/2 METHOD: 4.3 CM2
OHS LV EJECTION FRACTION SIMPSONS BIPLANE MOD: 8 %
PISA MRMAX VEL: 3.02 M/S
PULM VEIN S/D RATIO: 1.14
PV PEAK D VEL: 0.42 M/S
PV PEAK GRADIENT: 4 MMHG
PV PEAK S VEL: 0.48 M/S
PV PEAK VELOCITY: 1.05 M/S
RA MAJOR: 4.46 CM
RA PRESSURE ESTIMATED: 3 MMHG
RA WIDTH: 3.7 CM
RV TISSUE DOPPLER FREE WALL SYSTOLIC VELOCITY 1 (APICAL 4 CHAMBER VIEW): 13.85 CM/S
STJ: 2.9 CM
TDI LATERAL: 0.09 M/S
TDI SEPTAL: 0.09 M/S
TDI: 0.09 M/S
TRICUSPID ANNULAR PLANE SYSTOLIC EXCURSION: 2.3 CM
Z-SCORE OF LEFT VENTRICULAR DIMENSION IN END DIASTOLE: -8.16
Z-SCORE OF LEFT VENTRICULAR DIMENSION IN END SYSTOLE: -6.64

## 2023-08-16 PROCEDURE — 93306 TTE W/DOPPLER COMPLETE: CPT | Mod: PO

## 2023-08-16 PROCEDURE — 93306 TTE W/DOPPLER COMPLETE: CPT | Mod: 26,,, | Performed by: INTERNAL MEDICINE

## 2023-08-16 PROCEDURE — 93306 ECHO (CUPID ONLY): ICD-10-PCS | Mod: 26,,, | Performed by: INTERNAL MEDICINE

## 2023-08-22 ENCOUNTER — LAB VISIT (OUTPATIENT)
Dept: LAB | Facility: HOSPITAL | Age: 63
End: 2023-08-22
Payer: COMMERCIAL

## 2023-08-22 ENCOUNTER — OFFICE VISIT (OUTPATIENT)
Dept: UROLOGY | Facility: CLINIC | Age: 63
End: 2023-08-22
Payer: COMMERCIAL

## 2023-08-22 ENCOUNTER — HOSPITAL ENCOUNTER (OUTPATIENT)
Dept: RADIOLOGY | Facility: HOSPITAL | Age: 63
Discharge: HOME | End: 2023-08-22
Attending: FAMILY MEDICINE
Payer: COMMERCIAL

## 2023-08-22 VITALS
HEIGHT: 72 IN | SYSTOLIC BLOOD PRESSURE: 123 MMHG | HEART RATE: 82 BPM | WEIGHT: 268.94 LBS | BODY MASS INDEX: 36.43 KG/M2 | DIASTOLIC BLOOD PRESSURE: 77 MMHG

## 2023-08-22 DIAGNOSIS — R06.00 DYSPNEA: ICD-10-CM

## 2023-08-22 DIAGNOSIS — N40.1 BPH WITH URINARY OBSTRUCTION: ICD-10-CM

## 2023-08-22 DIAGNOSIS — N13.8 BPH WITH URINARY OBSTRUCTION: ICD-10-CM

## 2023-08-22 DIAGNOSIS — B00.9 HSV-2 INFECTION: ICD-10-CM

## 2023-08-22 DIAGNOSIS — R39.9 LOWER URINARY TRACT SYMPTOMS (LUTS): ICD-10-CM

## 2023-08-22 DIAGNOSIS — N40.1 BPH WITH URINARY OBSTRUCTION: Primary | ICD-10-CM

## 2023-08-22 DIAGNOSIS — N13.8 BPH WITH URINARY OBSTRUCTION: Primary | ICD-10-CM

## 2023-08-22 LAB
ALBUMIN SERPL BCP-MCNC: 3.8 G/DL (ref 3.5–5.2)
ALP SERPL-CCNC: 63 U/L (ref 55–135)
ALT SERPL W/O P-5'-P-CCNC: 23 U/L (ref 10–44)
AST SERPL-CCNC: 39 U/L (ref 10–40)
BILIRUB DIRECT SERPL-MCNC: 0.2 MG/DL (ref 0.1–0.3)
BILIRUB SERPL-MCNC: 0.7 MG/DL (ref 0.1–1)
BILIRUB SERPL-MCNC: NORMAL MG/DL
BLOOD URINE, POC: NORMAL
CLARITY, POC UA: CLEAR
COLOR, POC UA: YELLOW
COMPLEXED PSA SERPL-MCNC: 0.7 NG/ML (ref 0–4)
GLUCOSE UR QL STRIP: NORMAL
KETONES UR QL STRIP: NORMAL
LEUKOCYTE ESTERASE URINE, POC: NORMAL
NITRITE, POC UA: NORMAL
PH, POC UA: 6
PROT SERPL-MCNC: 7.1 G/DL (ref 6–8.4)
PROTEIN, POC: NORMAL
SPECIFIC GRAVITY, POC UA: 1.01
UROBILINOGEN, POC UA: NORMAL

## 2023-08-22 PROCEDURE — 1160F PR REVIEW ALL MEDS BY PRESCRIBER/CLIN PHARMACIST DOCUMENTED: ICD-10-PCS | Mod: CPTII,S$GLB,, | Performed by: NURSE PRACTITIONER

## 2023-08-22 PROCEDURE — 3078F PR MOST RECENT DIASTOLIC BLOOD PRESSURE < 80 MM HG: ICD-10-PCS | Mod: CPTII,S$GLB,, | Performed by: NURSE PRACTITIONER

## 2023-08-22 PROCEDURE — 99999 PR PBB SHADOW E&M-EST. PATIENT-LVL III: CPT | Mod: PBBFAC,,, | Performed by: NURSE PRACTITIONER

## 2023-08-22 PROCEDURE — 4010F ACE/ARB THERAPY RXD/TAKEN: CPT | Mod: CPTII,S$GLB,, | Performed by: NURSE PRACTITIONER

## 2023-08-22 PROCEDURE — 4010F PR ACE/ARB THEARPY RXD/TAKEN: ICD-10-PCS | Mod: CPTII,S$GLB,, | Performed by: NURSE PRACTITIONER

## 2023-08-22 PROCEDURE — 80076 HEPATIC FUNCTION PANEL: CPT | Performed by: NURSE PRACTITIONER

## 2023-08-22 PROCEDURE — 99999 PR PBB SHADOW E&M-EST. PATIENT-LVL III: ICD-10-PCS | Mod: PBBFAC,,, | Performed by: NURSE PRACTITIONER

## 2023-08-22 PROCEDURE — 1160F RVW MEDS BY RX/DR IN RCRD: CPT | Mod: CPTII,S$GLB,, | Performed by: NURSE PRACTITIONER

## 2023-08-22 PROCEDURE — 81002 POCT URINE DIPSTICK WITHOUT MICROSCOPE: ICD-10-PCS | Mod: S$GLB,,, | Performed by: NURSE PRACTITIONER

## 2023-08-22 PROCEDURE — 1159F PR MEDICATION LIST DOCUMENTED IN MEDICAL RECORD: ICD-10-PCS | Mod: CPTII,S$GLB,, | Performed by: NURSE PRACTITIONER

## 2023-08-22 PROCEDURE — 3008F PR BODY MASS INDEX (BMI) DOCUMENTED: ICD-10-PCS | Mod: CPTII,S$GLB,, | Performed by: NURSE PRACTITIONER

## 2023-08-22 PROCEDURE — 99214 OFFICE O/P EST MOD 30 MIN: CPT | Mod: S$GLB,,, | Performed by: NURSE PRACTITIONER

## 2023-08-22 PROCEDURE — 36415 COLL VENOUS BLD VENIPUNCTURE: CPT | Performed by: NURSE PRACTITIONER

## 2023-08-22 PROCEDURE — 3078F DIAST BP <80 MM HG: CPT | Mod: CPTII,S$GLB,, | Performed by: NURSE PRACTITIONER

## 2023-08-22 PROCEDURE — 3074F PR MOST RECENT SYSTOLIC BLOOD PRESSURE < 130 MM HG: ICD-10-PCS | Mod: CPTII,S$GLB,, | Performed by: NURSE PRACTITIONER

## 2023-08-22 PROCEDURE — 3074F SYST BP LT 130 MM HG: CPT | Mod: CPTII,S$GLB,, | Performed by: NURSE PRACTITIONER

## 2023-08-22 PROCEDURE — 3008F BODY MASS INDEX DOCD: CPT | Mod: CPTII,S$GLB,, | Performed by: NURSE PRACTITIONER

## 2023-08-22 PROCEDURE — 81002 URINALYSIS NONAUTO W/O SCOPE: CPT | Mod: S$GLB,,, | Performed by: NURSE PRACTITIONER

## 2023-08-22 PROCEDURE — 99214 PR OFFICE/OUTPT VISIT, EST, LEVL IV, 30-39 MIN: ICD-10-PCS | Mod: S$GLB,,, | Performed by: NURSE PRACTITIONER

## 2023-08-22 PROCEDURE — 84153 ASSAY OF PSA TOTAL: CPT | Performed by: NURSE PRACTITIONER

## 2023-08-22 PROCEDURE — 1159F MED LIST DOCD IN RCRD: CPT | Mod: CPTII,S$GLB,, | Performed by: NURSE PRACTITIONER

## 2023-08-22 RX ORDER — TIMOLOL MALEATE 5 MG/ML
1 SOLUTION/ DROPS OPHTHALMIC EVERY MORNING
COMMUNITY
Start: 2023-07-27

## 2023-08-22 RX ORDER — VALACYCLOVIR HYDROCHLORIDE 1 G/1
1000 TABLET, FILM COATED ORAL 2 TIMES DAILY
Qty: 14 TABLET | Refills: 0 | Status: SHIPPED | OUTPATIENT
Start: 2023-08-22 | End: 2023-08-29

## 2023-08-22 RX ORDER — TAMSULOSIN HYDROCHLORIDE 0.4 MG/1
1 CAPSULE ORAL DAILY
Qty: 90 CAPSULE | Refills: 3 | Status: SHIPPED | OUTPATIENT
Start: 2023-08-22 | End: 2024-03-19 | Stop reason: SDUPTHER

## 2023-08-22 RX ORDER — VALACYCLOVIR HYDROCHLORIDE 1 G/1
1000 TABLET, FILM COATED ORAL DAILY
Qty: 90 TABLET | Refills: 3 | Status: SHIPPED | OUTPATIENT
Start: 2023-08-29 | End: 2024-03-19 | Stop reason: SDUPTHER

## 2023-08-22 RX ORDER — VALSARTAN AND HYDROCHLOROTHIAZIDE 80; 12.5 MG/1; MG/1
1 TABLET, FILM COATED ORAL
COMMUNITY
Start: 2023-07-26

## 2023-08-22 NOTE — PROGRESS NOTES
CHIEF COMPLAINT:    Bhupinder Garces is a 62 y.o. male presents today for f/u visit.     HISTORY OF PRESENTING ILLINESS:    Bhupinder Garces is a 62 y.o. Male with history of BPH.  He has history of back pain, prostatitis, & hematospermia, HSV (+) 1&2.  Valtrex for suppressive therapy.   He was last seen in clinic 11/03/2021     Here today for f/u visit  Reports current HSV breakout.  Has been out of his Valtrex.    He taking the Flomax daily but still has the LUTS.   Nocturia 3x; ok during the night  He still drinking coffee all day; even at night; admits enjoying sodas/pepsi     Has been very busy at home and with his Religious           REVIEW OF SYSTEMS:  Review of Systems   Constitutional: Negative.  Negative for chills and fever.   Eyes:  Negative for double vision.   Respiratory:  Negative for cough and shortness of breath.    Cardiovascular:  Negative for chest pain.   Gastrointestinal:  Negative for abdominal pain, constipation, diarrhea, nausea and vomiting.   Genitourinary:  Positive for frequency. Negative for dysuria, flank pain and hematuria.        Ok with LUTS  (+) nocturia     Skin:  Positive for rash (groin).   Neurological:  Negative for dizziness and seizures.   Endo/Heme/Allergies:  Negative for polydipsia.         PATIENT HISTORY:    Past Medical History:   Diagnosis Date    Prostatitis        Past Surgical History:   Procedure Laterality Date    COLONOSCOPY N/A 5/7/2019    Procedure: COLONOSCOPY possible RBL ;  Surgeon: Fermin Wade MD;  Location: [address];  Service: Colon and Rectal;  Laterality: N/A;       History reviewed. No pertinent family history.    Social History     Socioeconomic History    Marital status:    Tobacco Use    Smoking status: Never    Smokeless tobacco: Never   Substance and Sexual Activity    Alcohol use: No    Drug use: Never       Allergies:  Patient has no known allergies.    Medications:    Current Outpatient Medications:     aspirin 325 mg Cap, Take  1 tablet by mouth once daily., Disp: , Rfl:     hydrocortisone 2.5 % cream, APPLY TOPICALLY TO THE AFFECTED AREA TWICE DAILY, Disp: 30 g, Rfl: 1    timolol maleate 0.5% (TIMOPTIC) 0.5 % Drop, Place 1 drop into both eyes every morning., Disp: , Rfl:     valsartan-hydrochlorothiazide (DIOVAN-HCT) 80-12.5 mg per tablet, Take 1 tablet by mouth., Disp: , Rfl:     tamsulosin (FLOMAX) 0.4 mg Cap, Take 1 capsule (0.4 mg total) by mouth once daily. Take 30 minutes after dinner, Disp: 90 capsule, Rfl: 3    valACYclovir (VALTREX) 1000 MG tablet, Take 1 tablet (1,000 mg total) by mouth 2 (two) times daily. for 7 days, Disp: 14 tablet, Rfl: 0    [START ON 8/29/2023] valACYclovir (VALTREX) 1000 MG tablet, Take 1 tablet (1,000 mg total) by mouth once daily., Disp: 90 tablet, Rfl: 3    PHYSICAL EXAMINATION:  Physical Exam  Vitals and nursing note reviewed.   Constitutional:       General: He is awake.      Appearance: Normal appearance.   HENT:      Head: Normocephalic.      Right Ear: External ear normal.      Left Ear: External ear normal.      Nose: Nose normal.   Cardiovascular:      Rate and Rhythm: Normal rate.   Pulmonary:      Effort: Pulmonary effort is normal. No respiratory distress.   Abdominal:      Tenderness: There is no abdominal tenderness. There is no right CVA tenderness or left CVA tenderness.   Genitourinary:     Penis: Uncircumcised. Erythema and lesions present. No phimosis, paraphimosis, hypospadias, discharge or swelling.       Testes: Normal.      Rectum: Normal.       Musculoskeletal:         General: Normal range of motion.      Cervical back: Normal range of motion.   Skin:     General: Skin is warm and dry.   Neurological:      General: No focal deficit present.      Mental Status: He is alert and oriented to person, place, and time.   Psychiatric:         Mood and Affect: Mood normal.         Behavior: Behavior is cooperative.           LABS:      In office UA today was clear of active infection and  blood.         Lab Results   Component Value Date    PSA 0.40 03/22/2011    PSA 0.4 10/01/2007    PSA 0.4 12/06/2005    PSADIAG 0.93 11/03/2021    PSADIAG 0.83 03/23/2021    PSADIAG 0.59 01/08/2020    PSATOTAL 0.3 04/12/2004       Lab Results   Component Value Date    CREATININE 1.21 08/22/2023    EGFRNORACEVR >60.0 08/22/2023             IMPRESSION:    Encounter Diagnoses   Name Primary?    BPH with urinary obstruction Yes    HSV-2 infection     Lower urinary tract symptoms (LUTS)          Assessment:       1. BPH with urinary obstruction    2. HSV-2 infection    3. Lower urinary tract symptoms (LUTS)        Plan:         I spent 30 minutes with the patient of which more than half was spent in direct consultation with the patient in regards to our treatment and plan.  We addressed the office findings and recent labs.   Education and recommendations of today's plan of care including home remedies and needed follow up with PCP.   We discussed the chief complaint; reviewed the LUTS and the possible contributory factors.   Reassurance no infection in urine  Recommended lifestyle modifications with a proper, healthy diet, good hydration but during the day. Reducing bladder irritants.   Benefits of regular exercise.  Discussed HSV expectations, risks, transmission; safe sex.  Rx for Valtrex 1000mg BID x 7 days then 1000mg daily for suppression.  Check PSA and LFT's today.   Repeat 3-6 months based on today  RTC one year

## 2023-08-25 ENCOUNTER — TELEPHONE (OUTPATIENT)
Dept: UROLOGY | Facility: CLINIC | Age: 63
End: 2023-08-25
Payer: COMMERCIAL

## 2023-08-25 ENCOUNTER — PATIENT MESSAGE (OUTPATIENT)
Dept: UROLOGY | Facility: CLINIC | Age: 63
End: 2023-08-25
Payer: COMMERCIAL

## 2023-08-25 NOTE — TELEPHONE ENCOUNTER
----- Message from Candace Salas sent at 8/25/2023  9:57 AM CDT -----  Regarding: Call back requested  Contact: [phone]  Hi, pt called to request a call back to discuss the pt's medication. Pls call the pt at [phone] to spk with him.

## 2024-03-15 ENCOUNTER — TELEPHONE (OUTPATIENT)
Dept: UROLOGY | Facility: CLINIC | Age: 64
End: 2024-03-15
Payer: COMMERCIAL

## 2024-03-15 NOTE — TELEPHONE ENCOUNTER
Spoke with pt to confirm if pt was trying to establish care with Dr. Moreno or keep current care with current urologist. PT stated he wanted to continue care with current urologist. Pt stated he was having some issues and was trying to be seen sooner. I advised to pt that he would need to continue care with urologist or see PCP. Pt voiced his understanding. I advised to pt that he would be removed from schedule

## 2024-03-19 ENCOUNTER — LAB VISIT (OUTPATIENT)
Dept: LAB | Facility: HOSPITAL | Age: 64
End: 2024-03-19
Payer: COMMERCIAL

## 2024-03-19 ENCOUNTER — OFFICE VISIT (OUTPATIENT)
Dept: UROLOGY | Facility: CLINIC | Age: 64
End: 2024-03-19
Payer: COMMERCIAL

## 2024-03-19 VITALS
BODY MASS INDEX: 37.89 KG/M2 | DIASTOLIC BLOOD PRESSURE: 89 MMHG | HEART RATE: 68 BPM | HEIGHT: 72 IN | WEIGHT: 279.75 LBS | SYSTOLIC BLOOD PRESSURE: 167 MMHG

## 2024-03-19 DIAGNOSIS — B00.9 HSV-2 INFECTION: ICD-10-CM

## 2024-03-19 DIAGNOSIS — N40.1 BPH WITH URINARY OBSTRUCTION: Primary | ICD-10-CM

## 2024-03-19 DIAGNOSIS — N40.1 BPH WITH URINARY OBSTRUCTION: ICD-10-CM

## 2024-03-19 DIAGNOSIS — N13.8 BPH WITH URINARY OBSTRUCTION: Primary | ICD-10-CM

## 2024-03-19 DIAGNOSIS — R10.2 PELVIC PAIN IN MALE: ICD-10-CM

## 2024-03-19 DIAGNOSIS — R35.0 URINARY FREQUENCY: ICD-10-CM

## 2024-03-19 DIAGNOSIS — N13.8 BPH WITH URINARY OBSTRUCTION: ICD-10-CM

## 2024-03-19 DIAGNOSIS — L29.9 ITCHING: ICD-10-CM

## 2024-03-19 DIAGNOSIS — R39.9 LOWER URINARY TRACT SYMPTOMS (LUTS): ICD-10-CM

## 2024-03-19 LAB
ALBUMIN SERPL BCP-MCNC: 3.9 G/DL (ref 3.5–5.2)
ALP SERPL-CCNC: 58 U/L (ref 55–135)
ALT SERPL W/O P-5'-P-CCNC: 24 U/L (ref 10–44)
ANION GAP SERPL CALC-SCNC: 4 MMOL/L (ref 8–16)
AST SERPL-CCNC: 35 U/L (ref 10–40)
BILIRUB DIRECT SERPL-MCNC: 0.2 MG/DL (ref 0.1–0.3)
BILIRUB SERPL-MCNC: 0.6 MG/DL (ref 0.1–1)
BILIRUB SERPL-MCNC: NORMAL MG/DL
BLOOD URINE, POC: NORMAL
BUN SERPL-MCNC: 12 MG/DL (ref 8–23)
CALCIUM SERPL-MCNC: 9.6 MG/DL (ref 8.7–10.5)
CHLORIDE SERPL-SCNC: 106 MMOL/L (ref 95–110)
CLARITY, POC UA: CLEAR
CO2 SERPL-SCNC: 26 MMOL/L (ref 23–29)
COLOR, POC UA: YELLOW
CREAT SERPL-MCNC: 1.3 MG/DL (ref 0.5–1.4)
EST. GFR  (NO RACE VARIABLE): >60 ML/MIN/1.73 M^2
GLUCOSE SERPL-MCNC: 106 MG/DL (ref 70–110)
GLUCOSE UR QL STRIP: NORMAL
KETONES UR QL STRIP: NORMAL
LEUKOCYTE ESTERASE URINE, POC: NORMAL
NITRITE, POC UA: NORMAL
PH, POC UA: 6
POTASSIUM SERPL-SCNC: 4.5 MMOL/L (ref 3.5–5.1)
PROT SERPL-MCNC: 6.8 G/DL (ref 6–8.4)
PROTEIN, POC: NORMAL
SODIUM SERPL-SCNC: 136 MMOL/L (ref 136–145)
SPECIFIC GRAVITY, POC UA: 1.01
UROBILINOGEN, POC UA: NORMAL

## 2024-03-19 PROCEDURE — 36415 COLL VENOUS BLD VENIPUNCTURE: CPT | Performed by: NURSE PRACTITIONER

## 2024-03-19 PROCEDURE — 99999 PR PBB SHADOW E&M-EST. PATIENT-LVL III: CPT | Mod: PBBFAC,,, | Performed by: NURSE PRACTITIONER

## 2024-03-19 PROCEDURE — 81002 URINALYSIS NONAUTO W/O SCOPE: CPT | Mod: PBBFAC | Performed by: NURSE PRACTITIONER

## 2024-03-19 PROCEDURE — 99215 OFFICE O/P EST HI 40 MIN: CPT | Mod: S$GLB,,, | Performed by: NURSE PRACTITIONER

## 2024-03-19 PROCEDURE — 80048 BASIC METABOLIC PNL TOTAL CA: CPT | Performed by: NURSE PRACTITIONER

## 2024-03-19 PROCEDURE — 99213 OFFICE O/P EST LOW 20 MIN: CPT | Mod: PBBFAC | Performed by: NURSE PRACTITIONER

## 2024-03-19 PROCEDURE — 80076 HEPATIC FUNCTION PANEL: CPT | Performed by: NURSE PRACTITIONER

## 2024-03-19 RX ORDER — NAPROXEN 500 MG/1
500 TABLET ORAL 2 TIMES DAILY WITH MEALS
Qty: 30 TABLET | Refills: 1 | Status: SHIPPED | OUTPATIENT
Start: 2024-03-19 | End: 2024-04-18

## 2024-03-19 RX ORDER — VALACYCLOVIR HYDROCHLORIDE 1 G/1
1000 TABLET, FILM COATED ORAL DAILY
Qty: 90 TABLET | Refills: 3 | Status: SHIPPED | OUTPATIENT
Start: 2024-03-19 | End: 2025-03-19

## 2024-03-19 RX ORDER — CETIRIZINE HYDROCHLORIDE 10 MG/1
10 TABLET ORAL NIGHTLY
Qty: 10 TABLET | Refills: 0 | Status: SHIPPED | OUTPATIENT
Start: 2024-03-19 | End: 2025-03-19

## 2024-03-19 RX ORDER — TAMSULOSIN HYDROCHLORIDE 0.4 MG/1
1 CAPSULE ORAL DAILY
Qty: 90 CAPSULE | Refills: 3 | Status: SHIPPED | OUTPATIENT
Start: 2024-03-19 | End: 2025-03-19

## 2024-03-19 RX ORDER — LATANOPROST 50 UG/ML
1 SOLUTION/ DROPS OPHTHALMIC NIGHTLY
COMMUNITY
Start: 2024-02-28

## 2024-03-19 NOTE — PROGRESS NOTES
CHIEF COMPLAINT:    Bhupinder Garces is a 63 y.o. male     HISTORY OF PRESENTING ILLINESS:    Bhupinder Garces is a 63 y.o. male who is an established patient in our clinic. He has a history of BPH with LUTS, prostatitis, hematospermia, HSV 1&2.  Takes Flomax for his LUTS and Valtrex for HSV suppression.     He has history of back pain, prostatitis, & hematospermia, HSV (+) 1&2.  Valtrex for suppressive therapy.   He was last seen in clinic 08/22/2023 with a PSA of 0.82.     Here today for f/u visit  Reporting worsening LUTS despite taking Flomax daily.   Nocturia 4-5x; only urinating just a little each time.     After having right knee surgery 03/2023 he has been having issues with back pain, leg cramps   Skin itching.   Recently started a new BP medication: Diovan-HCT.      He taking the Flomax daily but still has the LUTS.   Nocturia 3x; ok during the night  He still drinking coffee but slowed the fluids in the evening.   No issues with HSV outbreak.      Has been very busy at home and with his Gnostic[employer]            REVIEW OF SYSTEMS:  Review of Systems   Constitutional: Negative.  Negative for chills and fever.   Eyes:  Negative for double vision.   Respiratory:  Negative for cough and shortness of breath.    Cardiovascular:  Negative for chest pain and palpitations.   Gastrointestinal:  Negative for abdominal pain, constipation, diarrhea, nausea and vomiting.   Genitourinary:  Positive for frequency and urgency.        Nocturia 3-5x; minimal output       Musculoskeletal:  Positive for back pain and myalgias. Negative for falls.   Skin:  Positive for itching.   Neurological:  Negative for dizziness and seizures.   Endo/Heme/Allergies:  Negative for polydipsia.         PATIENT HISTORY:    Past Medical History:   Diagnosis Date    Prostatitis        Past Surgical History:   Procedure Laterality Date    COLONOSCOPY N/A 5/7/2019    Procedure: COLONOSCOPY possible RBL ;  Surgeon: Fermin Wade MD;  Location: [facility]  (4TH FLR);  Service: Colon and Rectal;  Laterality: N/A;       History reviewed. No pertinent family history.    Social History     Socioeconomic History    Marital status:    Tobacco Use    Smoking status: Never    Smokeless tobacco: Never   Substance and Sexual Activity    Alcohol use: No    Drug use: Never       Allergies:  Patient has no known allergies.    Medications:    Current Outpatient Medications:     hydrocortisone 2.5 % cream, APPLY TOPICALLY TO THE AFFECTED AREA TWICE DAILY, Disp: 30 g, Rfl: 1    latanoprost 0.005 % ophthalmic solution, Place 1 drop into both eyes every evening., Disp: , Rfl:     tamsulosin (FLOMAX) 0.4 mg Cap, Take 1 capsule (0.4 mg total) by mouth once daily. Take 30 minutes after dinner, Disp: 90 capsule, Rfl: 3    timolol maleate 0.5% (TIMOPTIC) 0.5 % Drop, Place 1 drop into both eyes every morning., Disp: , Rfl:     valACYclovir (VALTREX) 1000 MG tablet, Take 1 tablet (1,000 mg total) by mouth once daily., Disp: 90 tablet, Rfl: 3    valsartan-hydrochlorothiazide (DIOVAN-HCT) 80-12.5 mg per tablet, Take 1 tablet by mouth., Disp: , Rfl:     cetirizine (ZYRTEC) 10 MG tablet, Take 1 tablet (10 mg total) by mouth every evening., Disp: 10 tablet, Rfl: 0    naproxen (NAPROSYN) 500 MG tablet, Take 1 tablet (500 mg total) by mouth 2 (two) times daily with meals., Disp: 30 tablet, Rfl: 1    PHYSICAL EXAMINATION:  Physical Exam  Vitals and nursing note reviewed.   Constitutional:       General: He is awake.      Appearance: Normal appearance.   HENT:      Head: Normocephalic.      Right Ear: External ear normal.      Left Ear: External ear normal.      Nose: Nose normal.   Cardiovascular:      Rate and Rhythm: Normal rate.   Pulmonary:      Effort: Pulmonary effort is normal. No respiratory distress.   Abdominal:      Tenderness: There is no abdominal tenderness. There is no right CVA tenderness or left CVA tenderness.   Genitourinary:     Penis: Normal. No hypospadias.       Testes:  Normal.      Prostate: Enlarged. Not tender and no nodules present.      Rectum: Normal.   Musculoskeletal:         General: Normal range of motion.      Cervical back: Normal range of motion.   Skin:     General: Skin is warm and dry.   Neurological:      General: No focal deficit present.      Mental Status: He is alert and oriented to person, place, and time.   Psychiatric:         Mood and Affect: Mood normal.         Behavior: Behavior is cooperative.           LABS:      In office UA today was clear of active infection and blood.     The PVR in the office today done immediately after urination by the nurse was 65.        Lab Results   Component Value Date    PSA 0.40 03/22/2011    PSA 0.4 10/01/2007    PSA 0.4 12/06/2005    PSADIAG 0.70 08/22/2023    PSADIAG 0.93 11/03/2021    PSADIAG 0.83 03/23/2021    PSATOTAL 0.3 04/12/2004       Lab Results   Component Value Date    CREATININE 1.21 08/22/2023    EGFRNORACEVR >60.0 08/22/2023             IMPRESSION:    Encounter Diagnoses   Name Primary?    BPH with urinary obstruction Yes    Lower urinary tract symptoms (LUTS)     HSV-2 infection     Urinary frequency     Pelvic pain in male     Itching          Assessment:       1. BPH with urinary obstruction    2. Lower urinary tract symptoms (LUTS)    3. HSV-2 infection    4. Urinary frequency    5. Pelvic pain in male    6. Itching        Plan:         I spent 40 minutes with the patient of which more than half was spent in direct consultation with the patient in regards to our treatment and plan.  We addressed the office findings and recent labs.   Education and recommendations of today's plan of care including home remedies and needed follow up with PCP to discuss BP meds.  We discussed the chief complaint; reviewed the LUTS and the possible contributory factors.   Reassurance no infection in urine and he is emptying his bladder well.   Reviewed management  Recommended lifestyle modifications with a proper, healthy  diet, good hydration but during the day. Reducing bladder irritants.   Benefits of regular exercise and weight loss.   Rx for Naprosyn 500mg BID with x 2-4 weeks to help with discomfort and LUTS;   Zyrtec 10mg at hs x 10 days for itching.   Continue Flomax and Valtrex.  Check BMP and LFT's; hold off PSA due to worsening LUTS. Discussed last PSA had no indication of PCa. F/u based on results.

## 2024-08-21 ENCOUNTER — LAB VISIT (OUTPATIENT)
Dept: LAB | Facility: HOSPITAL | Age: 64
End: 2024-08-21
Payer: COMMERCIAL

## 2024-08-21 ENCOUNTER — OFFICE VISIT (OUTPATIENT)
Dept: UROLOGY | Facility: CLINIC | Age: 64
End: 2024-08-21
Payer: COMMERCIAL

## 2024-08-21 VITALS
WEIGHT: 275.81 LBS | SYSTOLIC BLOOD PRESSURE: 175 MMHG | HEART RATE: 84 BPM | HEIGHT: 72 IN | DIASTOLIC BLOOD PRESSURE: 91 MMHG | BODY MASS INDEX: 37.36 KG/M2

## 2024-08-21 DIAGNOSIS — R35.1 NOCTURIA: ICD-10-CM

## 2024-08-21 DIAGNOSIS — N40.1 BPH WITH URINARY OBSTRUCTION: Primary | ICD-10-CM

## 2024-08-21 DIAGNOSIS — N13.8 BPH WITH URINARY OBSTRUCTION: ICD-10-CM

## 2024-08-21 DIAGNOSIS — L29.9 ITCHING: ICD-10-CM

## 2024-08-21 DIAGNOSIS — R39.9 LOWER URINARY TRACT SYMPTOMS (LUTS): ICD-10-CM

## 2024-08-21 DIAGNOSIS — R06.83 SNORING: ICD-10-CM

## 2024-08-21 DIAGNOSIS — N40.1 BPH WITH URINARY OBSTRUCTION: ICD-10-CM

## 2024-08-21 DIAGNOSIS — N13.8 BPH WITH URINARY OBSTRUCTION: Primary | ICD-10-CM

## 2024-08-21 DIAGNOSIS — B00.9 HSV-2 INFECTION: ICD-10-CM

## 2024-08-21 LAB
ALBUMIN SERPL BCP-MCNC: 3.8 G/DL (ref 3.5–5.2)
ALP SERPL-CCNC: 56 U/L (ref 55–135)
ALT SERPL W/O P-5'-P-CCNC: 23 U/L (ref 10–44)
ANION GAP SERPL CALC-SCNC: 6 MMOL/L (ref 8–16)
AST SERPL-CCNC: 36 U/L (ref 10–40)
BILIRUB DIRECT SERPL-MCNC: 0.2 MG/DL (ref 0.1–0.3)
BILIRUB SERPL-MCNC: 0.6 MG/DL (ref 0.1–1)
BILIRUB SERPL-MCNC: NORMAL MG/DL
BLOOD URINE, POC: NORMAL
BUN SERPL-MCNC: 14 MG/DL (ref 8–23)
CALCIUM SERPL-MCNC: 9.4 MG/DL (ref 8.7–10.5)
CHLORIDE SERPL-SCNC: 105 MMOL/L (ref 95–110)
CLARITY, POC UA: CLEAR
CO2 SERPL-SCNC: 23 MMOL/L (ref 23–29)
COLOR, POC UA: YELLOW
COMPLEXED PSA SERPL-MCNC: 0.95 NG/ML (ref 0–4)
CREAT SERPL-MCNC: 1.2 MG/DL (ref 0.5–1.4)
EST. GFR  (NO RACE VARIABLE): >60 ML/MIN/1.73 M^2
GLUCOSE SERPL-MCNC: 96 MG/DL (ref 70–110)
GLUCOSE UR QL STRIP: NORMAL
KETONES UR QL STRIP: NORMAL
LEUKOCYTE ESTERASE URINE, POC: NORMAL
NITRITE, POC UA: NORMAL
PH, POC UA: 6.5
POC RESIDUAL URINE VOLUME: 48 ML (ref 0–100)
POTASSIUM SERPL-SCNC: 4.7 MMOL/L (ref 3.5–5.1)
PROT SERPL-MCNC: 6.9 G/DL (ref 6–8.4)
PROTEIN, POC: NORMAL
SODIUM SERPL-SCNC: 134 MMOL/L (ref 136–145)
SPECIFIC GRAVITY, POC UA: 1.02
UROBILINOGEN, POC UA: 0.2

## 2024-08-21 PROCEDURE — 36415 COLL VENOUS BLD VENIPUNCTURE: CPT | Performed by: NURSE PRACTITIONER

## 2024-08-21 PROCEDURE — 84153 ASSAY OF PSA TOTAL: CPT | Performed by: NURSE PRACTITIONER

## 2024-08-21 PROCEDURE — 99999 PR PBB SHADOW E&M-EST. PATIENT-LVL IV: CPT | Mod: PBBFAC,,, | Performed by: NURSE PRACTITIONER

## 2024-08-21 PROCEDURE — 80076 HEPATIC FUNCTION PANEL: CPT | Performed by: NURSE PRACTITIONER

## 2024-08-21 PROCEDURE — 80048 BASIC METABOLIC PNL TOTAL CA: CPT | Performed by: NURSE PRACTITIONER

## 2024-08-21 RX ORDER — AMLODIPINE BESYLATE 5 MG/1
5 TABLET ORAL
COMMUNITY
Start: 2024-08-12

## 2024-08-21 RX ORDER — VALACYCLOVIR HYDROCHLORIDE 1 G/1
1000 TABLET, FILM COATED ORAL DAILY
Qty: 90 TABLET | Refills: 3 | Status: SHIPPED | OUTPATIENT
Start: 2024-08-21 | End: 2025-08-21

## 2024-08-21 RX ORDER — TAMSULOSIN HYDROCHLORIDE 0.4 MG/1
1 CAPSULE ORAL DAILY
Qty: 90 CAPSULE | Refills: 3 | Status: SHIPPED | OUTPATIENT
Start: 2024-08-21 | End: 2025-08-21

## 2024-08-21 RX ORDER — VALSARTAN 80 MG/1
80 TABLET ORAL
COMMUNITY
Start: 2024-08-08

## 2024-08-21 NOTE — PROGRESS NOTES
CHIEF COMPLAINT:    Bhupinder Garces is a 63 y.o. male presents today for f/u visit.     HISTORY OF PRESENTING ILLINESS:    Bhupinder Garces is a 63 y.o. male who is an established patient in our clinic. He has a history of BPH with LUTS, prostatitis, hematospermia, HSV 1&2.  Takes Flomax for his LUTS and Valtrex for HSV suppression.      Seen in clinic 08/22/2023 with a PSA of 0.82.  Last visit was 03/19/2024 due to worsening LUTS  No infection. Recently started a new BP medication: Diovan-HCT.     Here today for f/u visit  Reports LUTS have improved. Nocturia 3 x.   Still taking his Flomax. FOS is good for him, no straining or leaking.   He snores; sometimes wakes him up so he gets on up and urinates.   No longer taking the Diovan HCT.   Only drinking caffeine free drinks. Green tea; no pm fluids  No issues with HSV outbreak.       Has been very busy at home and with his Bahai              REVIEW OF SYSTEMS:  Review of Systems   Constitutional: Negative.  Negative for chills and fever.   Eyes:  Negative for double vision.   Respiratory:  Negative for cough and shortness of breath.    Cardiovascular:  Negative for chest pain and palpitations.   Gastrointestinal:  Negative for abdominal pain, constipation, diarrhea, nausea and vomiting.   Genitourinary:  Positive for frequency. Negative for dysuria and hematuria.        See HPI   Musculoskeletal:  Negative for falls.   Skin:  Positive for itching.   Neurological:  Negative for dizziness and seizures.   Endo/Heme/Allergies:  Negative for polydipsia.         PATIENT HISTORY:    Past Medical History:   Diagnosis Date    Prostatitis        Past Surgical History:   Procedure Laterality Date    COLONOSCOPY N/A 5/7/2019    Procedure: COLONOSCOPY possible RBL ;  Surgeon: Fermin Wade MD;  Location: [facility] ([address] [place] [address]);  Service: Colon and Rectal;  Laterality: N/A;       No family history on file.    Social History     Socioeconomic History    Marital status:     Tobacco Use    Smoking status: Never    Smokeless tobacco: Never   Substance and Sexual Activity    Alcohol use: No    Drug use: Never       Allergies:  Patient has no known allergies.    Medications:    Current Outpatient Medications:     amLODIPine (NORVASC) 5 MG tablet, Take 5 mg by mouth., Disp: , Rfl:     cetirizine (ZYRTEC) 10 MG tablet, Take 1 tablet (10 mg total) by mouth every evening., Disp: 10 tablet, Rfl: 0    hydrocortisone 2.5 % cream, APPLY TOPICALLY TO THE AFFECTED AREA TWICE DAILY, Disp: 30 g, Rfl: 1    latanoprost 0.005 % ophthalmic solution, Place 1 drop into both eyes every evening., Disp: , Rfl:     timolol maleate 0.5% (TIMOPTIC) 0.5 % Drop, Place 1 drop into both eyes every morning., Disp: , Rfl:     valsartan (DIOVAN) 80 MG tablet, Take 80 mg by mouth., Disp: , Rfl:     valsartan-hydrochlorothiazide (DIOVAN-HCT) 80-12.5 mg per tablet, Take 1 tablet by mouth., Disp: , Rfl:     tamsulosin (FLOMAX) 0.4 mg Cap, Take 1 capsule (0.4 mg total) by mouth once daily. 30 minutes after dinner, Disp: 90 capsule, Rfl: 3    valACYclovir (VALTREX) 1000 MG tablet, Take 1 tablet (1,000 mg total) by mouth once daily., Disp: 90 tablet, Rfl: 3    PHYSICAL EXAMINATION:  Physical Exam  Vitals and nursing note reviewed.   Constitutional:       General: He is awake.      Appearance: Normal appearance. He is obese.   HENT:      Head: Normocephalic.      Right Ear: External ear normal.      Left Ear: External ear normal.      Nose: Nose normal.   Cardiovascular:      Rate and Rhythm: Normal rate.   Pulmonary:      Effort: Pulmonary effort is normal. No respiratory distress.   Abdominal:      Tenderness: There is no abdominal tenderness. There is no right CVA tenderness or left CVA tenderness.   Musculoskeletal:         General: Normal range of motion.      Cervical back: Normal range of motion.   Skin:     General: Skin is warm and dry.   Neurological:      General: No focal deficit present.      Mental Status: He is  alert and oriented to person, place, and time.   Psychiatric:         Mood and Affect: Mood normal.         Behavior: Behavior is cooperative.           LABS:      In office UA today was clear of active infection and blood.    The PVR in the office today done immediately after urination by the nurse was 48.        Lab Results   Component Value Date    PSA 0.40 03/22/2011    PSA 0.4 10/01/2007    PSA 0.4 12/06/2005    PSADIAG 0.70 08/22/2023    PSADIAG 0.93 11/03/2021    PSADIAG 0.83 03/23/2021    PSATOTAL 0.3 04/12/2004       Lab Results   Component Value Date    CREATININE 1.2 08/21/2024    EGFRNORACEVR >60.0 08/21/2024               IMPRESSION:    Encounter Diagnoses   Name Primary?    BPH with urinary obstruction Yes    Lower urinary tract symptoms (LUTS)     HSV-2 infection     Itching     Snoring     Nocturia          Assessment:       1. BPH with urinary obstruction    2. Lower urinary tract symptoms (LUTS)    3. HSV-2 infection    4. Itching    5. Snoring    6. Nocturia        Plan:         I spent 30 minutes with the patient of which more than half was spent in direct consultation with the patient in regards to our treatment and plan.  We addressed the office findings and recent labs; any possible need to go the ER today.   Education and recommendations of today's plan of care including home remedies and needed follow up with PCP.   We discussed the chief complaints; reviewed the LUTS and the possible contributory factors.   Reassurance no infection or visible blood seen in today's urine sample; emptying bladder well.  Reviewed management;Recommended lifestyle modifications with a proper, healthy diet, good hydration but during the day. Reducing bladder irritants.   Benefits of regular exercise and weight loss.   Continue Flomax, Valtrex; mild soap  Consult Dermatology for itching.  Consult Sleep studies for snoring; sleep apnea

## 2024-09-09 ENCOUNTER — TELEPHONE (OUTPATIENT)
Dept: DERMATOLOGY | Facility: CLINIC | Age: 64
End: 2024-09-09
Payer: COMMERCIAL

## 2024-09-16 ENCOUNTER — TELEPHONE (OUTPATIENT)
Dept: DERMATOLOGY | Facility: CLINIC | Age: 64
End: 2024-09-16
Payer: COMMERCIAL

## 2024-09-17 ENCOUNTER — TELEPHONE (OUTPATIENT)
Dept: DERMATOLOGY | Facility: CLINIC | Age: 64
End: 2024-09-17
Payer: COMMERCIAL

## 2024-09-24 ENCOUNTER — OFFICE VISIT (OUTPATIENT)
Dept: DERMATOLOGY | Facility: CLINIC | Age: 64
End: 2024-09-24
Payer: COMMERCIAL

## 2024-09-24 DIAGNOSIS — L28.1 PRURIGO NODULARIS: ICD-10-CM

## 2024-09-24 DIAGNOSIS — L28.0 LICHEN SIMPLEX CHRONICUS: Primary | ICD-10-CM

## 2024-09-24 DIAGNOSIS — L29.9 ITCHING: ICD-10-CM

## 2024-09-24 PROCEDURE — 99204 OFFICE O/P NEW MOD 45 MIN: CPT | Mod: S$GLB,,, | Performed by: DERMATOLOGY

## 2024-09-24 PROCEDURE — 4010F ACE/ARB THERAPY RXD/TAKEN: CPT | Mod: CPTII,S$GLB,, | Performed by: DERMATOLOGY

## 2024-09-24 PROCEDURE — 99999 PR PBB SHADOW E&M-EST. PATIENT-LVL II: CPT | Mod: PBBFAC,,, | Performed by: DERMATOLOGY

## 2024-09-24 RX ORDER — CLOBETASOL PROPIONATE 0.5 MG/G
OINTMENT TOPICAL 2 TIMES DAILY PRN
Qty: 60 G | Refills: 2 | Status: SHIPPED | OUTPATIENT
Start: 2024-09-24

## 2024-09-24 RX ORDER — AMMONIUM LACTATE 12 G/100G
LOTION TOPICAL 2 TIMES DAILY
Qty: 396 G | Refills: 11 | Status: SHIPPED | OUTPATIENT
Start: 2024-09-24

## 2024-09-24 NOTE — PROGRESS NOTES
Subjective:      Patient ID:  Bhupinder Garces is a 63 y.o. male who presents for   Chief Complaint   Patient presents with    Itching     HPI  63 y.o. M with no significant past dermatological history presents with a 1 year history of intermittent pruritus, dry skin, and a few itchy thick spots throughout his lower extremities. Patient reports that he got a knee replacement roughly 1.5 years ago and around they time they also started him on amlodipine for high blood pressure. He reports always having dry skin, but denies hx of eczema. Reports +family hx of eczema in his children. He admits to itching the thick spots on his legs often. He uses Divehi spring soap, tide detergent, and aveeno lotion. He does not moisturize daily. He has tried OTC antifungal creams and hydrocortisone sparingly without improvement. Itch is not keeping him up at night.     Review of Systems   Skin:  Positive for itching, rash and dry skin.       Objective:   Physical Exam   Constitutional: He appears well-developed and well-nourished. No distress.   Neurological: He is alert and oriented to person, place, and time. He is not disoriented.   Psychiatric: He has a normal mood and affect.   Skin:   Areas Examined (abnormalities noted in diagram):   Chest / Axilla Inspection Performed  Abdomen Inspection Performed  RUE Inspected  LUE Inspection Performed  RLE Inspected  LLE Inspection Performed            Diagram Legend     Erythematous scaling macule/papule c/w actinic keratosis       Vascular papule c/w angioma      Pigmented verrucoid papule/plaque c/w seborrheic keratosis      Yellow umbilicated papule c/w sebaceous hyperplasia      Irregularly shaped tan macule c/w lentigo     1-2 mm smooth white papules consistent with Milia      Movable subcutaneous cyst with punctum c/w epidermal inclusion cyst      Subcutaneous movable cyst c/w pilar cyst      Firm pink to brown papule c/w dermatofibroma      Pedunculated fleshy papule(s) c/w skin tag(s)       Evenly pigmented macule c/w junctional nevus     Mildly variegated pigmented, slightly irregular-bordered macule c/w mildly atypical nevus      Flesh colored to evenly pigmented papule c/w intradermal nevus       Pink pearly papule/plaque c/w basal cell carcinoma      Erythematous hyperkeratotic cursted plaque c/w SCC      Surgical scar with no sign of skin cancer recurrence      Open and closed comedones      Inflammatory papules and pustules      Verrucoid papule consistent consistent with wart     Erythematous eczematous patches and plaques     Dystrophic onycholytic nail with subungual debris c/w onychomycosis     Umbilicated papule    Erythematous-base heme-crusted tan verrucoid plaque consistent with inflamed seborrheic keratosis     Erythematous Silvery Scaling Plaque c/w Psoriasis     See annotation                      Assessment / Plan:        Lichen simplex chronicus  -     ammonium lactate (LAC-HYDRIN) 12 % lotion; Apply topically 2 (two) times daily. To entire body.  Dispense: 396 g; Refill: 11  -     clobetasol 0.05% (TEMOVATE) 0.05 % Oint; Apply topically 2 (two) times daily as needed (thick, itchy spots). Do not apply to face, armpits, or groin. Use for 2 weeks on, 1 week off, and repeat as needed.  Dispense: 60 g; Refill: 2    Itching  -     Ambulatory referral/consult to Dermatology  -     ammonium lactate (LAC-HYDRIN) 12 % lotion; Apply topically 2 (two) times daily. To entire body.  Dispense: 396 g; Refill: 11  -     clobetasol 0.05% (TEMOVATE) 0.05 % Oint; Apply topically 2 (two) times daily as needed (thick, itchy spots). Do not apply to face, armpits, or groin. Use for 2 weeks on, 1 week off, and repeat as needed.  Dispense: 60 g; Refill: 2    Prurigo nodularis  -     ammonium lactate (LAC-HYDRIN) 12 % lotion; Apply topically 2 (two) times daily. To entire body.  Dispense: 396 g; Refill: 11  -     clobetasol 0.05% (TEMOVATE) 0.05 % Oint; Apply topically 2 (two) times daily as needed  (thick, itchy spots). Do not apply to face, armpits, or groin. Use for 2 weeks on, 1 week off, and repeat as needed.  Dispense: 60 g; Refill: 2    Patient with diffuse xerosis with several lichenified dome-shaped nodules and thick lichenified hyperpigmented plaque with overlying excoriations revealing underlying erythematous skin. Most consistent with xerosis, LSC and prurigo nodularis. Discussed etiology and treatment options.   Plan:  - discussed dry sensitive skin care regimen: stop Turk[place] spring; recommend dove sensitive skin, cerave lotion, and tide free & clear or other fragrance free detergent  - start ammonium lactate 12% BID to entire body  - reduce hot showers to short lukewarm showers; moisturize immediately after showering while skin is still damp  - start clobetasol 0.05% ointment BID PRN to thick plaques/nodules; do not apply to face, armpits, or groin; use for 2 weeks on, 1 week off, and repeat as needed; discussed side effects of topical steroids including skin thinning, striae, and telangiectasias if used for prolonged periods of time without breaks  - xerosis handout provided to patient  - also recommended OTC sarna lotion PRN for pruritus          Follow up if symptoms worsen or fail to improve.    Florecita Latif MD  [facility] Dermatology PGY-III

## 2024-11-26 ENCOUNTER — HOSPITAL ENCOUNTER (EMERGENCY)
Facility: HOSPITAL | Age: 64
Discharge: HOME | End: 2024-11-26
Attending: STUDENT IN AN ORGANIZED HEALTH CARE EDUCATION/TRAINING PROGRAM
Payer: COMMERCIAL

## 2024-11-26 VITALS
OXYGEN SATURATION: 97 % | HEART RATE: 73 BPM | DIASTOLIC BLOOD PRESSURE: 92 MMHG | WEIGHT: 278 LBS | TEMPERATURE: 98 F | RESPIRATION RATE: 16 BRPM | BODY MASS INDEX: 36.84 KG/M2 | HEIGHT: 73 IN | SYSTOLIC BLOOD PRESSURE: 151 MMHG

## 2024-11-26 DIAGNOSIS — R06.00 DYSPNEA: ICD-10-CM

## 2024-11-26 DIAGNOSIS — R07.9 CHEST PAIN: Primary | ICD-10-CM

## 2024-11-26 LAB
ALBUMIN SERPL BCP-MCNC: 4.2 G/DL (ref 3.5–5.2)
ALP SERPL-CCNC: 50 U/L (ref 38–126)
ALT SERPL W/O P-5'-P-CCNC: 32 U/L (ref 10–44)
ANION GAP SERPL CALC-SCNC: 7 MMOL/L (ref 8–16)
AST SERPL-CCNC: 41 U/L (ref 15–46)
BASOPHILS # BLD AUTO: 0.03 K/UL (ref 0–0.2)
BASOPHILS NFR BLD: 0.5 % (ref 0–1.9)
BILIRUB SERPL-MCNC: 0.4 MG/DL (ref 0.1–1)
CALCIUM SERPL-MCNC: 9.4 MG/DL (ref 8.7–10.5)
CHLORIDE SERPL-SCNC: 100 MMOL/L (ref 95–110)
CO2 SERPL-SCNC: 26 MMOL/L (ref 23–29)
CREAT SERPL-MCNC: 1.19 MG/DL (ref 0.5–1.4)
DIFFERENTIAL METHOD BLD: ABNORMAL
EOSINOPHIL # BLD AUTO: 0.1 K/UL (ref 0–0.5)
EOSINOPHIL NFR BLD: 2 % (ref 0–8)
ERYTHROCYTE [DISTWIDTH] IN BLOOD BY AUTOMATED COUNT: 13.1 % (ref 11.5–14.5)
EST. GFR  (NO RACE VARIABLE): >60 ML/MIN/1.73 M^2
GLUCOSE SERPL-MCNC: 124 MG/DL (ref 70–110)
HCT VFR BLD AUTO: 37.8 % (ref 40–54)
HGB BLD-MCNC: 12.4 G/DL (ref 14–18)
IMM GRANULOCYTES # BLD AUTO: 0.02 K/UL (ref 0–0.04)
IMM GRANULOCYTES NFR BLD AUTO: 0.4 % (ref 0–0.5)
INFLUENZA A, MOLECULAR: NEGATIVE
INFLUENZA B, MOLECULAR: NEGATIVE
LYMPHOCYTES # BLD AUTO: 1.8 K/UL (ref 1–4.8)
LYMPHOCYTES NFR BLD: 32.6 % (ref 18–48)
MCH RBC QN AUTO: 28.5 PG (ref 27–31)
MCHC RBC AUTO-ENTMCNC: 32.8 G/DL (ref 32–36)
MCV RBC AUTO: 87 FL (ref 82–98)
MONOCYTES # BLD AUTO: 0.7 K/UL (ref 0.3–1)
MONOCYTES NFR BLD: 12.6 % (ref 4–15)
NEUTROPHILS # BLD AUTO: 2.9 K/UL (ref 1.8–7.7)
NEUTROPHILS NFR BLD: 51.9 % (ref 38–73)
NRBC BLD-RTO: 0 /100 WBC
NT-PROBNP SERPL-MCNC: <20 PG/ML (ref 5–900)
OHS QRS DURATION: 76 MS
OHS QTC CALCULATION: 384 MS
PLATELET # BLD AUTO: 261 K/UL (ref 150–450)
PMV BLD AUTO: 10.1 FL (ref 9.2–12.9)
POTASSIUM SERPL-SCNC: 4.2 MMOL/L (ref 3.5–5.1)
PROT SERPL-MCNC: 7.3 G/DL (ref 6–8.4)
RBC # BLD AUTO: 4.35 M/UL (ref 4.6–6.2)
SARS-COV-2 RDRP RESP QL NAA+PROBE: NEGATIVE
SODIUM SERPL-SCNC: 133 MMOL/L (ref 136–145)
SPECIMEN SOURCE: NORMAL
TROPONIN I SERPL-MCNC: <0.012 NG/ML (ref 0.01–0.03)
UUN UR-MCNC: 12 MG/DL (ref 2–20)
WBC # BLD AUTO: 5.49 K/UL (ref 3.9–12.7)

## 2024-11-26 PROCEDURE — 87635 SARS-COV-2 COVID-19 AMP PRB: CPT | Mod: ER | Performed by: STUDENT IN AN ORGANIZED HEALTH CARE EDUCATION/TRAINING PROGRAM

## 2024-11-26 PROCEDURE — 80053 COMPREHEN METABOLIC PANEL: CPT | Mod: ER | Performed by: STUDENT IN AN ORGANIZED HEALTH CARE EDUCATION/TRAINING PROGRAM

## 2024-11-26 PROCEDURE — 99285 EMERGENCY DEPT VISIT HI MDM: CPT | Mod: 25,ER

## 2024-11-26 PROCEDURE — 83880 ASSAY OF NATRIURETIC PEPTIDE: CPT | Mod: ER | Performed by: STUDENT IN AN ORGANIZED HEALTH CARE EDUCATION/TRAINING PROGRAM

## 2024-11-26 PROCEDURE — 99900035 HC TECH TIME PER 15 MIN (STAT): Mod: ER

## 2024-11-26 PROCEDURE — 84484 ASSAY OF TROPONIN QUANT: CPT | Mod: ER | Performed by: STUDENT IN AN ORGANIZED HEALTH CARE EDUCATION/TRAINING PROGRAM

## 2024-11-26 PROCEDURE — 85025 COMPLETE CBC W/AUTO DIFF WBC: CPT | Mod: ER | Performed by: STUDENT IN AN ORGANIZED HEALTH CARE EDUCATION/TRAINING PROGRAM

## 2024-11-26 PROCEDURE — 93005 ELECTROCARDIOGRAM TRACING: CPT | Mod: ER

## 2024-11-26 PROCEDURE — 93010 ELECTROCARDIOGRAM REPORT: CPT | Mod: ,,, | Performed by: STUDENT IN AN ORGANIZED HEALTH CARE EDUCATION/TRAINING PROGRAM

## 2024-11-26 PROCEDURE — 87502 INFLUENZA DNA AMP PROBE: CPT | Mod: ER | Performed by: STUDENT IN AN ORGANIZED HEALTH CARE EDUCATION/TRAINING PROGRAM

## 2024-11-26 PROCEDURE — 94760 N-INVAS EAR/PLS OXIMETRY 1: CPT | Mod: ER

## 2024-11-26 NOTE — ED NOTES
Assumed care of pt who came in reporting intermittent L-sided chest pain (denies any current pain) X 3 weeks. He has no other complaints and is lying in bed quietly in no distress. Negative assessment otherwise

## 2024-11-27 NOTE — ED PROVIDER NOTES
NAME:  Bhupinder Garces  CSN:     [id number]  MRN:    [id number]  ADMIT DATE: 11/26/2024        eMERGENCY dEPARTMENT eNCOUnter    CHIEF COMPLAINT    Chief Complaint   Patient presents with    Chest Pain     Pt c/o intermitt shortness of breath and chest pain to left chest wall pain for a couple days. Reports lingering more today. Describes as aching. Pt able to talk in complete sentence, vss. Reports did not take night time medication for BP. Report pain scale 8/10.        HPI    Bhupinder Garces is a 64 y.o. male with a past medical history of  has a past medical history of Prostatitis.     he presents to the ED due to 2 weeks of intermittent left-sided chest pain that has become constant in the last 48 hours.  Notes nothing provokes or palliate the pain.  Notes it is sharp in nature.  Nonradiating.  Has intermittent dyspnea on exertion.  No cough or congestion.  No other symptoms reported.  Has a history of hypertension but denies hyperlipidemia or diabetes.  No cardiac history.  Does not smoke.      HPI       PAST MEDICAL HISTORY  Past Medical History:   Diagnosis Date    Prostatitis        SURGICAL HISTORY    Past Surgical History:   Procedure Laterality Date    COLONOSCOPY N/A 5/7/2019    Procedure: COLONOSCOPY possible RBL ;  Surgeon: Fermin Wade MD;  Location: [facility] ([address] [place] [address]);  Service: Colon and Rectal;  Laterality: N/A;       FAMILY HISTORY    No family history on file.    SOCIAL HISTORY    Social History     Socioeconomic History    Marital status:    Tobacco Use    Smoking status: Never    Smokeless tobacco: Never   Substance and Sexual Activity    Alcohol use: No    Drug use: Never     Social Drivers of Health     Financial Resource Strain: High Risk (9/24/2024)    Overall Financial Resource Strain (CARDIA)     Difficulty of Paying Living Expenses: Very hard   Food Insecurity: No Food Insecurity (9/24/2024)    Hunger Vital Sign     Worried About Running Out of Food in the Last Year: Never true     Ran  "Out of Food in the Last Year: Never true   Physical Activity: Sufficiently Active (9/24/2024)    Exercise Vital Sign     Days of Exercise per Week: 6 days     Minutes of Exercise per Session: 90 min   Stress: No Stress Concern Present (9/24/2024)    [employer] [place] [employer] - Occupational Stress Questionnaire     Feeling of Stress : Not at all   Housing Stability: High Risk (9/24/2024)    Housing Stability Vital Sign     Unable to Pay for Housing in the Last Year: Yes       MEDICATIONS  Current Outpatient Medications   Medication Instructions    amLODIPine (NORVASC) 5 mg, Oral    ammonium lactate (LAC-HYDRIN) 12 % lotion Topical (Top), 2 times daily, To entire body.    cetirizine (ZYRTEC) 10 mg, Oral, Nightly    clobetasol 0.05% (TEMOVATE) 0.05 % Oint Topical (Top), 2 times daily PRN, Do not apply to face, armpits, or groin. Use for 2 weeks on, 1 week off, and repeat as needed.    hydrocortisone 2.5 % cream APPLY TOPICALLY TO THE AFFECTED AREA TWICE DAILY    latanoprost 0.005 % ophthalmic solution 1 drop, Both Eyes, Nightly    tamsulosin (FLOMAX) 0.4 mg, Oral, Daily, 30 minutes after dinner    timolol maleate 0.5% (TIMOPTIC) 0.5 % Drop 1 drop, Both Eyes, Every morning    valACYclovir (VALTREX) 1,000 mg, Oral, Daily    valsartan (DIOVAN) 80 mg, Oral    valsartan-hydrochlorothiazide (DIOVAN-HCT) 80-12.5 mg per tablet 1 tablet, Oral       ALLERGIES    Review of patient's allergies indicates:  No Known Allergies      REVIEW OF SYSTEMS   Review of Systems       PHYSICAL EXAM    Reviewed Triage Note    VITAL SIGNS:   ED Triage Vitals [11/26/24 0030]   Encounter Vitals Group      BP (!) 173/89      Systolic BP Percentile       Diastolic BP Percentile       Pulse 75      Resp 16      Temp 97.9 °F (36.6 °C)      Temp Source Oral      SpO2 96 %      Weight 278 lb      Height 6' 1"      Head Circumference       Peak Flow       Pain Score       Pain Loc       Pain Education       Exclude from Growth Chart  "       No data found.    Physical Exam    Nursing note and vitals reviewed.  Constitutional: He appears well-developed and well-nourished.   HENT:   Head: Normocephalic and atraumatic.   Eyes: EOM are normal. Pupils are equal, round, and reactive to light.   Neck: Neck supple.   Normal range of motion.  Cardiovascular:  Normal rate and regular rhythm.           Pulmonary/Chest: Breath sounds normal. No respiratory distress.   Abdominal: Abdomen is soft. There is no abdominal tenderness.   Musculoskeletal:         General: Normal range of motion.      Cervical back: Normal range of motion and neck supple.     Neurological: He is alert and oriented to person, place, and time.   Skin: Skin is warm and dry.   Psychiatric: He has a normal mood and affect.                EKG     Interpreted by EM physician if performed:   Sinus rhythm with first-degree AV block at a rate of 76.  No ST elevation or depression noted.        LABS  Pertinent labs reviewed. (See chart for details)   Labs Reviewed   CBC W/ AUTO DIFFERENTIAL - Abnormal       Result Value    WBC 5.49      RBC 4.35 (*)     Hemoglobin 12.4 (*)     Hematocrit 37.8 (*)     MCV 87      MCH 28.5      MCHC 32.8      RDW 13.1      Platelets 261      MPV 10.1      Immature Granulocytes 0.4      Gran # (ANC) 2.9      Immature Grans (Abs) 0.02      Lymph # 1.8      Mono # 0.7      Eos # 0.1      Baso # 0.03      nRBC 0      Gran % 51.9      Lymph % 32.6      Mono % 12.6      Eosinophil % 2.0      Basophil % 0.5      Differential Method Automated     COMPREHENSIVE METABOLIC PANEL - Abnormal    Sodium 133 (*)     Potassium 4.2      Chloride 100      CO2 26      Glucose 124 (*)     BUN 12      Creatinine 1.19      Calcium 9.4      Total Protein 7.3      Albumin 4.2      Total Bilirubin 0.4      Alkaline Phosphatase 50      AST 41      ALT 32      Anion Gap 7 (*)     eGFR >60.0     INFLUENZA A & B BY MOLECULAR    Influenza A, Molecular Negative      Influenza B, Molecular  Negative      Flu A & B Source Nasal swab     TROPONIN I    Troponin I <0.012     SARS-COV-2 RNA AMPLIFICATION, QUAL    SARS-CoV-2 RNA, Amplification, Qual Negative     NT-PRO NATRIURETIC PEPTIDE    NT-proBNP <20           RADIOLOGY          Imaging Results              X-Ray Chest AP Portable (Final result)  Result time 11/26/24 02:00:08      Final result by Arnol Person MD (11/26/24 02:00:08)                   Impression:      No acute findings.      Electronically signed by: Arnol Person  Date:    11/26/2024  Time:    02:00               Narrative:    EXAMINATION:  XR CHEST AP PORTABLE    CLINICAL HISTORY:  Dyspnea, unspecified    TECHNIQUE:  Single frontal view of the chest was performed.    COMPARISON:  09/23/2022    FINDINGS:  Lungs are clear. No focal consolidation. No pleural effusion. No pneumothorax. Normal heart size.                                        PROCEDURES    Procedures      ED COURSE & MEDICAL DECISION MAKING    Pertinent Labs & Imaging studies reviewed. (See chart for details and specific orders.)          Summary of review of records:   Last visit with primary care in April of this year.  Only known to have hypertension.    Medical Decision Making    Bhupinder Garces is a 64 y.o. male who presents for evaluation of 2 weeks of intermittent chest pain that has become constant in the last 48 hours.  Notes mild dyspnea on exertion intermittently.  Smiling, interactive, nontoxic appearing.    Differential includes but is not limited to musculoskeletal strain, GERD, infection, clinically low suspicion for ACS.  While PE was a consideration, currently feel that this has less likely given history and exam.          Medications - No data to display    ED Course as of 11/27/24 0310   Tue Nov 26, 2024   0146 NT-proBNP: <20  within normal limits  [HL]   0146 SARS-CoV-2 RNA, Amplification, Qual: Negative [HL]   0150 Troponin I: <0.012  within normal limits  [HL]   0150 Comprehensive metabolic  panel(!)  No acute abnormalities  [HL]   0150 CBC auto differential(!)  No acute abnormalities  [HL]   0154 Influenza A, Molecular: Negative [HL]   0154 Influenza B, Molecular: Negative [HL]      ED Course User Index  [HL] Kera Krishnamurthy,      No acute emergent medical condition has been identified. The patient appears to be low risk for an emergent medical condition is appropriate for discharge with outpatient f/u as detailed in discharge instructions for reevaluation and possible continued outpatient workup and management. I have discussed the workup with the patient, who has verbalized understanding of the plan and need for outpatient follow-up.  This evaluation does not preclude the development of an emergent condition so in addition, I have advised the patient that they can return to the ED at any time with worsening or change of their symptoms, or with any other medical complaint.           FINAL IMPRESSION    Final diagnoses:  [R07.9] Chest pain (Primary)  [R06.00] Dyspnea       DISPOSITION  Patient discharged in stable condition        ED Prescriptions    None       Follow-up Information       Follow up With Specialties Details Why Contact Info    Your Primary Care Physician  Schedule an appointment as soon as possible for a visit       [facility] - Emergency Dept Emergency Medicine  As needed, If symptoms worsen [address] [facility]  Emer[facility] Department  [facility] [id number] [phone]              DISCLAIMER: This note was prepared with [employer] voice recognition transcription software. Garbled syntax, mangled pronouns, and other bizarre constructions may be attributed to that software system.             Kera Krishnamurthy,   11/27/24 0313

## 2024-12-04 NOTE — PROGRESS NOTES
Cardiology Clinic Visit    History of Present Illness:       Bhupinder Garces is a pleasant 64 y.o. 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 Patient reports he has had a few episodes of elevated HR subsequently having CP described as a pinch. Associated with SOB. Self resolved. He stop taking Diovan-HCTZ 2/2 increase urinary frequency and was started on diovan alone and developed a rash. Only taking amlodipine 5 mg qd. Denies cp, sob, palpitations, presyncope/dizziness, syncope, orthopnea, PND, bendopnea, decrease ET, NVDC, fever, chills.      ECG NSR with 1st AVB    ER note 11/26  he presents to the ED due to 2 weeks of intermittent left-sided chest pain that has become constant in the last 48 hours.  Notes nothing provokes or palliate the pain.  Notes it is sharp in nature.  Nonradiating.  Has intermittent dyspnea on exertion.  No cough or congestion.  No other symptoms reported.  Has a history of hypertension but denies hyperlipidemia or diabetes.  No cardiac history.  Does not smoke.      Echo 8/16/23    Left Ventricle: The left ventricle is normal in size. Normal wall thickness. Normal wall motion. There is normal systolic function. Ejection fraction by visual approximation is 65%. There is normal diastolic function.    Right Ventricle: Normal right ventricular cavity size. Wall thickness is normal. Right ventricle wall motion  is normal. Systolic function is normal.    Aortic Valve: The aortic valve is possibly a bicuspid valve.    Tricuspid Valve: There is mild regurgitation.    Pulmonic Valve: There is mild regurgitation.    IVC/SVC: Normal venous pressure at 3 mmHg.    History obtained by patient interview and supplemented by nursing documentation and chart review.   PMHx:  has a past medical history of Prostatitis.   SurgHx:  has a past surgical history that includes Colonoscopy (N/A, 5/7/2019).   FamHx: family history is not on file.   SocialHx:  reports  "that he has never smoked. He has never used smokeless tobacco. He reports that he does not drink alcohol and does not use drugs.  Home Meds:  Current Outpatient Medications   Medication Instructions    ammonium lactate (LAC-HYDRIN) 12 % lotion Topical (Top), 2 times daily, To entire body.    cetirizine (ZYRTEC) 10 mg, Oral, Nightly    clobetasol 0.05% (TEMOVATE) 0.05 % Oint Topical (Top), 2 times daily PRN, Do not apply to face, armpits, or groin. Use for 2 weeks on, 1 week off, and repeat as needed.    hydrocortisone 2.5 % cream APPLY TOPICALLY TO THE AFFECTED AREA TWICE DAILY    latanoprost 0.005 % ophthalmic solution 1 drop, Nightly    NIFEdipine (PROCARDIA-XL) 30 mg, Oral, Daily    tamsulosin (FLOMAX) 0.4 mg, Oral, Daily, 30 minutes after dinner    timolol maleate 0.5% (TIMOPTIC) 0.5 % Drop 1 drop, Every morning    valACYclovir (VALTREX) 1,000 mg, Oral, Daily       Review of Systems: Comprehensive ROS was performed and is negative unless otherwise noted in HPI.   Objective   Objective/Exam:   BP (!) 158/89 (BP Location: Left arm, Patient Position: Sitting)   Pulse 77   Ht 6' 1" (1.854 m)   Wt 126.7 kg (279 lb 5.2 oz)   SpO2 97%   BMI 36.85 kg/m²    Wt Readings from Last 4 Encounters:   12/05/24 126.7 kg (279 lb 5.2 oz)   11/26/24 126.1 kg (278 lb)   08/21/24 125.1 kg (275 lb 12.7 oz)   03/19/24 126.9 kg (279 lb 12.2 oz)      Constitutional: NAD, Atraumatic, Conversant   HEENT: MMM, Sclera anicteric, No JVD   Cardiovascular: RRR, no murmurs noted, no chest tenderness to palpation, 2+ radial pulses b/l  Pulmonary: normal respiratory effort, CTAB, no crackles, wheezes  Abdominal: S/NT/ND  Musculoskeletal: No lower extremity edema noted b/l  Neurological: No gross neurological deficits  Skin: W/D/I  Psych: Appropriate affect, normal mood  Labs/Imaging/Procedures   Personally reviewed  Lab Results   Component Value Date     (L) 11/26/2024    K 4.2 11/26/2024     11/26/2024    CO2 26 11/26/2024    BUN " "12 11/26/2024    CREATININE 1.19 11/26/2024    ANIONGAP 7 (L) 11/26/2024     Lab Results   Component Value Date    HGBA1C 6.1 12/06/2005     No results found for: "BNP", "BNPTRIAGEBLO"   Lab Results   Component Value Date    WBC 5.49 11/26/2024    HGB 12.4 (L) 11/26/2024    HCT 37.8 (L) 11/26/2024     11/26/2024    GRAN 2.9 11/26/2024    GRAN 51.9 11/26/2024     Lab Results   Component Value Date    CHOL 194 10/01/2007    HDL 46 10/01/2007    LDLCALC 132.2 (H) 10/01/2007    TRIG 79 10/01/2007     Lab Results   Component Value Date    TSH 1.1 10/01/2007     No results found for: "APOLIPOPROTE"  No results found for: "LIPOA"     TTE:  Results for orders placed during the hospital encounter of 08/16/23    Echo    Interpretation Summary    Left Ventricle: The left ventricle is normal in size. Normal wall thickness. Normal wall motion. There is normal systolic function. Ejection fraction by visual approximation is 65%. There is normal diastolic function.    Right Ventricle: Normal right ventricular cavity size. Wall thickness is normal. Right ventricle wall motion  is normal. Systolic function is normal.    Aortic Valve: The aortic valve is possibly a bicuspid valve.    Tricuspid Valve: There is mild regurgitation.    Pulmonic Valve: There is mild regurgitation.    IVC/SVC: Normal venous pressure at 3 mmHg.    Stress Testing:   No results found for this or any previous visit.     Coronary Angiogram:  No results found for this or any previous visit.      -Reviewing Medical records & lab results  -Independently reviewing and interpreting (if not documented by myself) EKGs, echocardiograms, catherizations   -Obtaining a history, performing a relevant exam, counseling/educating the patient/family  -Documenting clinical information in the EMR including ordering of tests  -Care coordination and communicating with other health care providers       Problem List:     1. Precordial chest pain    2. Chest pain    3. " Hypertension, unspecified type      Assessment/Plan:   Bhupinder Garces is a pleasant 64 y.o. 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     Plan:  Stop amlodipine  Start Nifedipine 30 mg qd-->will continue to uptitrate as tolerate  Stress test  Echo  Blood work     Preventative Care:  Lipids:  PVD(V/A)      Patient expressed verbal understanding and agreed with the plan     Return sooner for concerns or questions. If symptoms persist go to the ED.  I have reviewed all pertinent data including patient's medical history in detail and updated the computerized patient record.       Total time spent counseling greater than fifty percent of total visit time.  Counseling included discussion regarding imaging findings, diagnosis, possibilities, treatment options, risks and benefits.      Thank you for the opportunity to care for this patient. Please don't hesitate to reach out with any questions/concerns         Jose Juan Diane MD  Cardiovascular Disease; Interventional Cardiology  [facility]

## 2024-12-05 ENCOUNTER — OFFICE VISIT (OUTPATIENT)
Dept: CARDIOLOGY | Facility: CLINIC | Age: 64
End: 2024-12-05
Payer: COMMERCIAL

## 2024-12-05 VITALS
HEIGHT: 73 IN | HEART RATE: 77 BPM | WEIGHT: 279.31 LBS | SYSTOLIC BLOOD PRESSURE: 158 MMHG | OXYGEN SATURATION: 97 % | BODY MASS INDEX: 37.02 KG/M2 | DIASTOLIC BLOOD PRESSURE: 89 MMHG

## 2024-12-05 DIAGNOSIS — I10 HYPERTENSION, UNSPECIFIED TYPE: Primary | ICD-10-CM

## 2024-12-05 DIAGNOSIS — R07.9 CHEST PAIN: ICD-10-CM

## 2024-12-05 DIAGNOSIS — R07.2 PRECORDIAL CHEST PAIN: ICD-10-CM

## 2024-12-05 PROCEDURE — 99999 PR PBB SHADOW E&M-EST. PATIENT-LVL IV: CPT | Mod: PBBFAC,,, | Performed by: STUDENT IN AN ORGANIZED HEALTH CARE EDUCATION/TRAINING PROGRAM

## 2024-12-05 RX ORDER — VALSARTAN 80 MG/1
80 TABLET ORAL DAILY
Qty: 90 TABLET | Refills: 3 | Status: CANCELLED | OUTPATIENT
Start: 2024-12-05 | End: 2025-12-05

## 2024-12-05 RX ORDER — NIFEDIPINE 30 MG/1
30 TABLET, EXTENDED RELEASE ORAL DAILY
Qty: 30 TABLET | Refills: 11 | Status: SHIPPED | OUTPATIENT
Start: 2024-12-05 | End: 2025-12-05

## 2024-12-13 ENCOUNTER — HOSPITAL ENCOUNTER (OUTPATIENT)
Dept: CARDIOLOGY | Facility: HOSPITAL | Age: 64
Discharge: HOME | End: 2024-12-13
Attending: STUDENT IN AN ORGANIZED HEALTH CARE EDUCATION/TRAINING PROGRAM
Payer: COMMERCIAL

## 2024-12-13 VITALS — WEIGHT: 278 LBS | BODY MASS INDEX: 36.84 KG/M2 | HEIGHT: 73 IN

## 2024-12-13 DIAGNOSIS — I10 HYPERTENSION, UNSPECIFIED TYPE: ICD-10-CM

## 2024-12-13 DIAGNOSIS — R07.9 CHEST PAIN: ICD-10-CM

## 2024-12-13 LAB
APICAL FOUR CHAMBER EJECTION FRACTION: 61 %
APICAL TWO CHAMBER EJECTION FRACTION: 70 %
ASCENDING AORTA: 3.73 CM
AV INDEX (PROSTH): 0.71
AV MEAN GRADIENT: 3.7 MMHG
AV PEAK GRADIENT: 6.8 MMHG
AV REGURGITATION PRESSURE HALF TIME: 1144.38 MS
AV VALVE AREA BY VELOCITY RATIO: 2.9 CM²
AV VALVE AREA: 2.9 CM²
AV VELOCITY RATIO: 0.69
BSA FOR ECHO PROCEDURE: 2.55 M2
CV ECHO LV RWT: 0.46 CM
DOP CALC AO PEAK VEL: 1.3 M/S
DOP CALC AO VTI: 26.5 CM
DOP CALC LVOT AREA: 4.2 CM2
DOP CALC LVOT DIAMETER: 2.3 CM
DOP CALC LVOT PEAK VEL: 0.9 M/S
DOP CALC LVOT STROKE VOLUME: 77.7 CM3
DOP CALC MV VTI: 19.1 CM
DOP CALCLVOT PEAK VEL VTI: 18.7 CM
E WAVE DECELERATION TIME: 186.11 MSEC
E/A RATIO: 0.82
E/E' RATIO: 10.31 M/S
ECHO LV POSTERIOR WALL: 1.1 CM (ref 0.6–1.1)
FRACTIONAL SHORTENING: 31.3 % (ref 28–44)
INTERVENTRICULAR SEPTUM: 0.9 CM (ref 0.6–1.1)
IVC DIAMETER: 1.45 CM
IVRT: 87.54 MSEC
LEFT ATRIUM AREA SYSTOLIC (APICAL 2 CHAMBER): 16.42 CM2
LEFT ATRIUM AREA SYSTOLIC (APICAL 4 CHAMBER): 15.43 CM2
LEFT ATRIUM VOLUME INDEX MOD: 16 ML/M2
LEFT ATRIUM VOLUME MOD: 39.75 ML
LEFT INTERNAL DIMENSION IN SYSTOLE: 3.3 CM (ref 2.1–4)
LEFT VENTRICLE DIASTOLIC VOLUME INDEX: 42.43 ML/M2
LEFT VENTRICLE DIASTOLIC VOLUME: 105.23 ML
LEFT VENTRICLE END DIASTOLIC VOLUME APICAL 2 CHAMBER: 49.65 ML
LEFT VENTRICLE END DIASTOLIC VOLUME APICAL 4 CHAMBER: 41.49 ML
LEFT VENTRICLE END SYSTOLIC VOLUME APICAL 2 CHAMBER: 44.49 ML
LEFT VENTRICLE END SYSTOLIC VOLUME APICAL 4 CHAMBER: 34.33 ML
LEFT VENTRICLE MASS INDEX: 68.6 G/M2
LEFT VENTRICLE SYSTOLIC VOLUME INDEX: 17.9 ML/M2
LEFT VENTRICLE SYSTOLIC VOLUME: 44.38 ML
LEFT VENTRICULAR INTERNAL DIMENSION IN DIASTOLE: 4.8 CM (ref 3.5–6)
LEFT VENTRICULAR MASS: 170.2 G
LV LATERAL E/E' RATIO: 11.17 M/S
LV SEPTAL E/E' RATIO: 9.57 M/S
LVED V (TEICH): 105.23 ML
LVES V (TEICH): 44.38 ML
LVOT MG: 1.94 MMHG
LVOT MV: 0.67 CM/S
MV A" WAVE DURATION": 83.73 MSEC
MV MEAN GRADIENT: 1 MMHG
MV PEAK A VEL: 0.82 M/S
MV PEAK E VEL: 0.67 M/S
MV PEAK GRADIENT: 3 MMHG
MV STENOSIS PRESSURE HALF TIME: 53.97 MS
MV VALVE AREA BY CONTINUITY EQUATION: 4.07 CM2
MV VALVE AREA P 1/2 METHOD: 4.08 CM2
OHS CV RV/LV RATIO: 0.67 CM
OHS LV EJECTION FRACTION SIMPSONS BIPLANE MOD: 65 %
PISA AR MAX VEL: 2.72 M/S
PISA MRMAX VEL: 3.13 M/S
PULM VEIN S/D RATIO: 0.98
PV PEAK D VEL: 0.4 M/S
PV PEAK GRADIENT: 3 MMHG
PV PEAK S VEL: 0.39 M/S
PV PEAK VELOCITY: 0.9 M/S
RA PRESSURE ESTIMATED: 3 MMHG
RA VOL SYS: 55.22 ML
RIGHT ATRIAL AREA: 18.3 CM2
RIGHT ATRIUM VOLUME AREA LENGTH APICAL 4 CHAMBER: 52.38 ML
RIGHT VENTRICLE DIASTOLIC BASEL DIMENSION: 3.2 CM
RV TISSUE DOPPLER FREE WALL SYSTOLIC VELOCITY 1 (APICAL 4 CHAMBER VIEW): 14.07 CM/S
SINUS: 3.19 CM
STJ: 2.98 CM
TDI LATERAL: 0.06 M/S
TDI SEPTAL: 0.07 M/S
TDI: 0.07 M/S
TRICUSPID ANNULAR PLANE SYSTOLIC EXCURSION: 2.66 CM
Z-SCORE OF LEFT VENTRICULAR DIMENSION IN END DIASTOLE: -10.14
Z-SCORE OF LEFT VENTRICULAR DIMENSION IN END SYSTOLE: -6.86

## 2024-12-13 PROCEDURE — 93306 TTE W/DOPPLER COMPLETE: CPT | Mod: 26,,, | Performed by: STUDENT IN AN ORGANIZED HEALTH CARE EDUCATION/TRAINING PROGRAM

## 2024-12-13 PROCEDURE — 93306 TTE W/DOPPLER COMPLETE: CPT | Mod: PN

## 2024-12-27 ENCOUNTER — HOSPITAL ENCOUNTER (OUTPATIENT)
Dept: CARDIOLOGY | Facility: HOSPITAL | Age: 64
Discharge: HOME | End: 2024-12-27
Attending: STUDENT IN AN ORGANIZED HEALTH CARE EDUCATION/TRAINING PROGRAM
Payer: COMMERCIAL

## 2024-12-27 DIAGNOSIS — I10 HYPERTENSION, UNSPECIFIED TYPE: ICD-10-CM

## 2024-12-27 LAB
CV STRESS BASE HR: 87 BPM
DIASTOLIC BLOOD PRESSURE: 84 MMHG
OHS CV CPX 85 PERCENT MAX PREDICTED HEART RATE MALE: 132
OHS CV CPX ESTIMATED METS: 7
OHS CV CPX MAX PREDICTED HEART RATE: 155
OHS CV CPX PATIENT IS FEMALE: 0
OHS CV CPX PATIENT IS MALE: 1
OHS CV CPX PEAK DIASTOLIC BLOOD PRESSURE: 58 MMHG
OHS CV CPX PEAK HEAR RATE: 146 BPM
OHS CV CPX PEAK RATE PRESSURE PRODUCT: NORMAL
OHS CV CPX PEAK SYSTOLIC BLOOD PRESSURE: 234 MMHG
OHS CV CPX PERCENT MAX PREDICTED HEART RATE ACHIEVED: 94
OHS CV CPX RATE PRESSURE PRODUCT PRESENTING: NORMAL
STRESS ECHO POST EXERCISE DUR MIN: 5 MINUTES
STRESS ECHO POST EXERCISE DUR SEC: 26 SECONDS
SYSTOLIC BLOOD PRESSURE: 127 MMHG

## 2024-12-27 PROCEDURE — 93352 ADMIN ECG CONTRAST AGENT: CPT | Mod: ,,, | Performed by: INTERNAL MEDICINE

## 2024-12-27 PROCEDURE — 25500020 PHARM REV CODE 255: Performed by: INTERNAL MEDICINE

## 2024-12-27 PROCEDURE — 93351 STRESS TTE COMPLETE: CPT | Mod: 26,,, | Performed by: INTERNAL MEDICINE

## 2024-12-27 PROCEDURE — 93351 STRESS TTE COMPLETE: CPT

## 2024-12-27 RX ADMIN — PERFLUTREN 1.3 ML: 6.52 INJECTION, SUSPENSION INTRAVENOUS at 10:12

## 2024-12-30 NOTE — PROGRESS NOTES
Cardiology Clinic note    Subjective:   Patient ID:  Bhupinder Garces is a 64 y.o. male who presents for follow-up of HTN, CP    HPI    12/31/2024: Previous patient of Dr. Diane as below, initial visit for me. Here for F/U HTN, CP. Seen in clinic with spouse reports overall doing well. Staying active, goes to gym regularly, no recurrent CP/SOB. CV w/u unremarkable for cardiac etiology of CP, reviewed with patient as below. Remains HTN. Patient denies CP, SOB/GAONA, orthopnea, PND, syncope, palpitations, LE edema. Reports compliance and tolerance with all medications.    12/5/2024  Bhupinder Garces is a pleasant 64 y.o. 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 Patient reports he has had a few episodes of elevated HR subsequently having CP described as a pinch. Associated with SOB. Self resolved. He stop taking Diovan-HCTZ 2/2 increase urinary frequency and was started on diovan alone and developed a rash. Only taking amlodipine 5 mg qd. Denies cp, sob, palpitations, presyncope/dizziness, syncope, orthopnea, PND, bendopnea, decrease ET, NVDC, fever, chills.       CV Hx  ----------------------------------     ECG NSR with 1st AVB     ER note 11/26  he presents to the ED due to 2 weeks of intermittent left-sided chest pain that has become constant in the last 48 hours.  Notes nothing provokes or palliate the pain.  Notes it is sharp in nature.  Nonradiating.  Has intermittent dyspnea on exertion.  No cough or congestion.  No other symptoms reported.  Has a history of hypertension but denies hyperlipidemia or diabetes.  No cardiac history.  Does not smoke.         Stress Echo 12/27/2024    Post-stress Conclusion: The study is negative with no echocardiographic evidence of stress induced ischemia.    ECG Conclusion: The ECG portion of the study is negative for ischemia.    Stress ECG: There is no ST segment deviation identified during the protocol. During stress, rare PVCs  are noted.    Echo 12/2024     Left Ventricle: The left ventricle is normal in size. There is concentric remodeling. There is normal systolic function. Quantitated ejection fraction is 65%. There is normal diastolic function.    Right Ventricle: Normal right ventricular cavity size. Systolic function is normal.    Aorta: Aortic root is normal in size measuring 3.19 cm. Ascending aorta is the upper limit of normal in size.    IVC/SVC: Normal venous pressure at 3 mmHg.    Echo 8/16/23    Left Ventricle: The left ventricle is normal in size. Normal wall thickness. Normal wall motion. There is normal systolic function. Ejection fraction by visual approximation is 65%. There is normal diastolic function.    Right Ventricle: Normal right ventricular cavity size. Wall thickness is normal. Right ventricle wall motion  is normal. Systolic function is normal.    Aortic Valve: The aortic valve is possibly a bicuspid valve.    Tricuspid Valve: There is mild regurgitation.    Pulmonic Valve: There is mild regurgitation.    IVC/SVC: Normal venous pressure at 3 mmHg.     History obtained by patient interview and supplemented by nursing documentation and chart review.   PMHx:  has a past medical history of Prostatitis.   SurgHx:  has a past surgical history that includes Colonoscopy (N/A, 5/7/2019).   FamHx: family history is not on file.   SocialHx:  reports that he has never smoked. He has never used smokeless tobacco. He reports that he does not drink alcohol and does not use drugs.    Past Medical History:   Diagnosis Date    Prostatitis           Patient Active Problem List    Diagnosis Date Noted    Hyperlipemia 12/31/2024    Primary hypertension 12/31/2024    Pre-diabetes 12/31/2024     A1C 6.2      Primary osteoarthritis of right knee 10/08/2022    Screening for colon cancer 05/07/2019    BPH with urinary obstruction 06/05/2018       Patient's Medications   New Prescriptions    ROSUVASTATIN (CRESTOR) 10 MG TABLET    Take 1  tablet (10 mg total) by mouth once daily.   Previous Medications    AMMONIUM LACTATE (LAC-HYDRIN) 12 % LOTION    Apply topically 2 (two) times daily. To entire body.    CETIRIZINE (ZYRTEC) 10 MG TABLET    Take 1 tablet (10 mg total) by mouth every evening.    CLOBETASOL 0.05% (TEMOVATE) 0.05 % OINT    Apply topically 2 (two) times daily as needed (thick, itchy spots). Do not apply to face, armpits, or groin. Use for 2 weeks on, 1 week off, and repeat as needed.    HYDROCORTISONE 2.5 % CREAM    APPLY TOPICALLY TO THE AFFECTED AREA TWICE DAILY    LATANOPROST 0.005 % OPHTHALMIC SOLUTION    Place 1 drop into both eyes every evening.    TAMSULOSIN (FLOMAX) 0.4 MG CAP    Take 1 capsule (0.4 mg total) by mouth once daily. 30 minutes after dinner    TIMOLOL MALEATE 0.5% (TIMOPTIC) 0.5 % DROP    Place 1 drop into both eyes every morning.    VALACYCLOVIR (VALTREX) 1000 MG TABLET    Take 1 tablet (1,000 mg total) by mouth once daily.   Modified Medications    Modified Medication Previous Medication    NIFEDIPINE (PROCARDIA-XL) 60 MG (OSM) 24 HR TABLET NIFEdipine (PROCARDIA-XL) 30 MG (OSM) 24 hr tablet       Take 1 tablet (60 mg total) by mouth once daily.    Take 1 tablet (30 mg total) by mouth once daily.   Discontinued Medications    No medications on file        Review of Systems   Constitutional: Negative for chills, decreased appetite, diaphoresis, malaise/fatigue, weight gain and weight loss.   Cardiovascular:  Negative for chest pain, claudication, dyspnea on exertion, irregular heartbeat, leg swelling, near-syncope, orthopnea, palpitations, paroxysmal nocturnal dyspnea and syncope.   Respiratory:  Negative for cough, hemoptysis, shortness of breath and snoring.    Gastrointestinal:  Negative for bloating, abdominal pain, nausea and vomiting.   Neurological:  Negative for light-headedness and weakness.       No family history on file.    Social History     Socioeconomic History    Marital status:    Tobacco Use  "   Smoking status: Never    Smokeless tobacco: Never   Substance and Sexual Activity    Alcohol use: No    Drug use: Never     Social Drivers of Health     Financial Resource Strain: High Risk (9/24/2024)    Overall Financial Resource Strain (CARDIA)     Difficulty of Paying Living Expenses: Very hard   Food Insecurity: No Food Insecurity (9/24/2024)    Hunger Vital Sign     Worried About Running Out of Food in the Last Year: Never true     Ran Out of Food in the Last Year: Never true   Physical Activity: Sufficiently Active (9/24/2024)    Exercise Vital Sign     Days of Exercise per Week: 6 days     Minutes of Exercise per Session: 90 min   Stress: No Stress Concern Present (9/24/2024)    [employer] - Occupational Stress Questionnaire     Feeling of Stress : Not at all   Housing Stability: High Risk (9/24/2024)    Housing Stability Vital Sign     Unable to Pay for Housing in the Last Year: Yes            Objective:   Vitals  Vitals:    12/31/24 0922 12/31/24 0927   BP: (!) 148/84 (!) 150/88   Pulse: 76    SpO2: 98%    Weight: 126.6 kg (279 lb 1.6 oz)    Height: 6' 1" (1.854 m)           Physical Exam  Vitals and nursing note reviewed.   Constitutional:       Appearance: Normal appearance.   Cardiovascular:      Rate and Rhythm: Normal rate and regular rhythm.      Heart sounds: No murmur heard.     No gallop.   Pulmonary:      Effort: Pulmonary effort is normal.      Breath sounds: Normal breath sounds. No wheezing or rales.   Abdominal:      General: Bowel sounds are normal. There is no distension.      Palpations: Abdomen is soft.      Tenderness: There is no abdominal tenderness.   Musculoskeletal:      Right lower leg: No edema.      Left lower leg: No edema.   Skin:     General: Skin is warm and dry.   Neurological:      Mental Status: He is alert and oriented to person, place, and time.         Lab Results    Lab Results   Component Value Date    WBC 5.49 11/26/2024    HGB 12.4 " "(L) 11/26/2024    HCT 37.8 (L) 11/26/2024    MCV 87 11/26/2024       Lab Results   Component Value Date     11/26/2024    INR 1.0 08/05/2022       Lab Results   Component Value Date    K 4.2 11/26/2024    MG 2.0 09/23/2022    BUN 12 11/26/2024    CREATININE 1.19 11/26/2024       Lab Results   Component Value Date     (H) 11/26/2024    HGBA1C 6.2 (H) 12/13/2024       Lab Results   Component Value Date    AST 41 11/26/2024    ALT 32 11/26/2024    ALBUMIN 4.2 11/26/2024    PROT 7.3 11/26/2024       Lab Results   Component Value Date    CHOL 217 (H) 12/13/2024    HDL 49 12/13/2024    LDLCALC 151.2 12/13/2024    TRIG 84 12/13/2024       No results found for: "CRP", "BNP"      Assessment:     1. Hyperlipidemia, unspecified hyperlipidemia type    2. Hypertension, unspecified type    3. Primary hypertension    4. Pre-diabetes        Plan:     Bhupinder Garces is a pleasant 64 y.o. 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      HTN  -BP remains above goal  -escalate nifedipine to 60mg daily  -goal BP < 130/80  -home monitor, log  -will F/U close for ongoing escalation    2. HLD  -ASCVD 23.7 as below  -rec crestor 10  -update lipid panel, LFTs ~ 3m  -target LDLc < 100    3. preDM  -A1C 6.2  -dietary modification education  -stay active  -monitor closely      The 10-year ASCVD risk score (Roscoe BARTON, et al., 2019) is: 21.7%    Values used to calculate the score:      Age: 64 years      Sex: Male      Is Non- : Yes      Diabetic: No      Tobacco smoker: No      Systolic Blood Pressure: 150 mmHg      Is BP treated: Yes      HDL Cholesterol: 49 mg/dL      Total Cholesterol: 217 mg/dL    Orders Placed This Encounter   Procedures    Comprehensive Metabolic Panel     Standing Status:   Future     Standing Expiration Date:   3/31/2026     Order Specific Question:   Send normal result to authorizing provider's In Basket if patient is active on MyChart: "     Answer:   Yes    Lipid Panel     Standing Status:   Future     Standing Expiration Date:   3/31/2026     Order Specific Question:   Send normal result to authorizing provider's In Basket if patient is active on MyChart:     Answer:   Yes       He expressed verbal understanding and agreed with the plan    Follow up in 2-3 weeks  Labs ~ 3m    Pertinent cardiac images and EKG reviewed independently.    Continue with current medical plan and lifestyle changes.  Return sooner for concerns or questions. If symptoms persist go to the ED.  I have reviewed all pertinent data including patient's medical history in detail and updated the computerized patient record.     Counseling included discussion regarding imaging findings, diagnosis, possibilities, treatment options, risks and benefits.    Thank you for the opportunity to care for this patient. Will be available for questions if needed.      Signed:  Dre Carroll DNP  12/31/2024

## 2024-12-31 ENCOUNTER — OFFICE VISIT (OUTPATIENT)
Dept: CARDIOLOGY | Facility: CLINIC | Age: 64
End: 2024-12-31
Payer: MEDICARE

## 2024-12-31 VITALS
DIASTOLIC BLOOD PRESSURE: 88 MMHG | WEIGHT: 279.13 LBS | HEART RATE: 76 BPM | HEIGHT: 73 IN | OXYGEN SATURATION: 98 % | BODY MASS INDEX: 36.99 KG/M2 | SYSTOLIC BLOOD PRESSURE: 150 MMHG

## 2024-12-31 DIAGNOSIS — I10 HYPERTENSION, UNSPECIFIED TYPE: ICD-10-CM

## 2024-12-31 DIAGNOSIS — R73.03 PRE-DIABETES: ICD-10-CM

## 2024-12-31 DIAGNOSIS — E78.5 HYPERLIPIDEMIA, UNSPECIFIED HYPERLIPIDEMIA TYPE: Primary | Chronic | ICD-10-CM

## 2024-12-31 DIAGNOSIS — I10 PRIMARY HYPERTENSION: Chronic | ICD-10-CM

## 2024-12-31 PROCEDURE — 99214 OFFICE O/P EST MOD 30 MIN: CPT | Mod: S$PBB,,,

## 2024-12-31 PROCEDURE — 99214 OFFICE O/P EST MOD 30 MIN: CPT | Mod: PBBFAC,PO

## 2024-12-31 PROCEDURE — 99999 PR PBB SHADOW E&M-EST. PATIENT-LVL IV: CPT | Mod: PBBFAC,,,

## 2024-12-31 RX ORDER — ROSUVASTATIN CALCIUM 10 MG/1
10 TABLET, COATED ORAL DAILY
Qty: 90 TABLET | Refills: 3 | Status: SHIPPED | OUTPATIENT
Start: 2024-12-31 | End: 2025-12-31

## 2024-12-31 RX ORDER — NIFEDIPINE 60 MG/1
60 TABLET, EXTENDED RELEASE ORAL DAILY
Qty: 30 TABLET | Refills: 11 | Status: SHIPPED | OUTPATIENT
Start: 2024-12-31 | End: 2025-12-31

## 2025-01-13 PROBLEM — E66.01 SEVERE OBESITY (BMI 35.0-39.9) WITH COMORBIDITY: Status: ACTIVE | Noted: 2025-01-13

## 2025-01-13 NOTE — PROGRESS NOTES
Cardiology Clinic note    Subjective:   Patient ID:  Bhupinder Garces is a 64 y.o. male who presents for follow-up of HTN, CP    HPI    1/14/2025: Here for F/U HTN, HLD. Seen in clinic unaccompanied reports overall doing well. Staying active, no recurrent CP. Remains HTN. Patient denies CP, SOB/GAONA, orthopnea, PND, syncope, palpitations, LE edema. Reports compliance and tolerance with all medications.    12/31/2024: Previous patient of Dr. Diane as below, initial visit for me. Here for F/U HTN, CP. Seen in clinic with spouse reports overall doing well. Staying active, goes to gym regularly, no recurrent CP/SOB. CV w/u unremarkable for cardiac etiology of CP, reviewed with patient as below. Remains HTN. Patient denies CP, SOB/GAONA, orthopnea, PND, syncope, palpitations, LE edema. Reports compliance and tolerance with all medications.    12/5/2024  Bhupinder Garces is a pleasant 64 y.o. 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 Patient reports he has had a few episodes of elevated HR subsequently having CP described as a pinch. Associated with SOB. Self resolved. He stop taking Diovan-HCTZ 2/2 increase urinary frequency and was started on diovan alone and developed a rash. Only taking amlodipine 5 mg qd. Denies cp, sob, palpitations, presyncope/dizziness, syncope, orthopnea, PND, bendopnea, decrease ET, NVDC, fever, chills.       CV Hx  ----------------------------------     ECG NSR with 1st AVB     ER note 11/26  he presents to the ED due to 2 weeks of intermittent left-sided chest pain that has become constant in the last 48 hours.  Notes nothing provokes or palliate the pain.  Notes it is sharp in nature.  Nonradiating.  Has intermittent dyspnea on exertion.  No cough or congestion.  No other symptoms reported.  Has a history of hypertension but denies hyperlipidemia or diabetes.  No cardiac history.  Does not smoke.         Stress Echo 12/27/2024    Post-stress  Conclusion: The study is negative with no echocardiographic evidence of stress induced ischemia.    ECG Conclusion: The ECG portion of the study is negative for ischemia.    Stress ECG: There is no ST segment deviation identified during the protocol. During stress, rare PVCs are noted.    Echo 12/2024     Left Ventricle: The left ventricle is normal in size. There is concentric remodeling. There is normal systolic function. Quantitated ejection fraction is 65%. There is normal diastolic function.    Right Ventricle: Normal right ventricular cavity size. Systolic function is normal.    Aorta: Aortic root is normal in size measuring 3.19 cm. Ascending aorta is the upper limit of normal in size.    IVC/SVC: Normal venous pressure at 3 mmHg.    Echo 8/16/23    Left Ventricle: The left ventricle is normal in size. Normal wall thickness. Normal wall motion. There is normal systolic function. Ejection fraction by visual approximation is 65%. There is normal diastolic function.    Right Ventricle: Normal right ventricular cavity size. Wall thickness is normal. Right ventricle wall motion  is normal. Systolic function is normal.    Aortic Valve: The aortic valve is possibly a bicuspid valve.    Tricuspid Valve: There is mild regurgitation.    Pulmonic Valve: There is mild regurgitation.    IVC/SVC: Normal venous pressure at 3 mmHg.     History obtained by patient interview and supplemented by nursing documentation and chart review.   PMHx:  has a past medical history of Prostatitis.   SurgHx:  has a past surgical history that includes Colonoscopy (N/A, 5/7/2019).   FamHx: family history is not on file.   SocialHx:  reports that he has never smoked. He has never used smokeless tobacco. He reports that he does not drink alcohol and does not use drugs.    Past Medical History:   Diagnosis Date    Prostatitis           Patient Active Problem List    Diagnosis Date Noted    Severe obesity (BMI 35.0-39.9) with comorbidity  01/13/2025    Hyperlipemia 12/31/2024    Primary hypertension 12/31/2024    Pre-diabetes 12/31/2024     A1C 6.2      Primary osteoarthritis of right knee 10/08/2022    Screening for colon cancer 05/07/2019    BPH with urinary obstruction 06/05/2018       Patient's Medications   New Prescriptions    No medications on file   Previous Medications    AMMONIUM LACTATE (LAC-HYDRIN) 12 % LOTION    Apply topically 2 (two) times daily. To entire body.    CETIRIZINE (ZYRTEC) 10 MG TABLET    Take 1 tablet (10 mg total) by mouth every evening.    CLOBETASOL 0.05% (TEMOVATE) 0.05 % OINT    Apply topically 2 (two) times daily as needed (thick, itchy spots). Do not apply to face, armpits, or groin. Use for 2 weeks on, 1 week off, and repeat as needed.    CLOTRIMAZOLE-BETAMETHASONE 1-0.05% (LOTRISONE) CREAM    Apply topically 2 (two) times daily as needed.    HYDROCORTISONE 2.5 % CREAM    APPLY TOPICALLY TO THE AFFECTED AREA TWICE DAILY    LATANOPROST 0.005 % OPHTHALMIC SOLUTION    Place 1 drop into both eyes every evening.    ROSUVASTATIN (CRESTOR) 10 MG TABLET    Take 1 tablet (10 mg total) by mouth once daily.    TAMSULOSIN (FLOMAX) 0.4 MG CAP    Take 1 capsule (0.4 mg total) by mouth once daily. 30 minutes after dinner    TIMOLOL MALEATE 0.5% (TIMOPTIC) 0.5 % DROP    Place 1 drop into both eyes every morning.    VALACYCLOVIR (VALTREX) 1000 MG TABLET    Take 1 tablet (1,000 mg total) by mouth once daily.   Modified Medications    Modified Medication Previous Medication    NIFEDIPINE (PROCARDIA-XL) 90 MG (OSM) 24 HR TABLET NIFEdipine (PROCARDIA-XL) 60 MG (OSM) 24 hr tablet       Take 1 tablet (90 mg total) by mouth once daily.    Take 1 tablet (60 mg total) by mouth once daily.   Discontinued Medications    No medications on file        Review of Systems   Constitutional: Negative for chills, decreased appetite, diaphoresis, malaise/fatigue, weight gain and weight loss.   Cardiovascular:  Negative for chest pain, claudication,  "dyspnea on exertion, irregular heartbeat, leg swelling, near-syncope, orthopnea, palpitations, paroxysmal nocturnal dyspnea and syncope.   Respiratory:  Negative for cough, hemoptysis, shortness of breath and snoring.    Gastrointestinal:  Negative for bloating, abdominal pain, nausea and vomiting.   Neurological:  Negative for light-headedness and weakness.       No family history on file.    Social History     Socioeconomic History    Marital status:    Tobacco Use    Smoking status: Never    Smokeless tobacco: Never   Substance and Sexual Activity    Alcohol use: No    Drug use: Never     Social Drivers of Health     Financial Resource Strain: High Risk (9/24/2024)    Overall Financial Resource Strain (CARDIA)     Difficulty of Paying Living Expenses: Very hard   Food Insecurity: No Food Insecurity (9/24/2024)    Hunger Vital Sign     Worried About Running Out of Food in the Last Year: Never true     Ran Out of Food in the Last Year: Never true   Physical Activity: Sufficiently Active (9/24/2024)    Exercise Vital Sign     Days of Exercise per Week: 6 days     Minutes of Exercise per Session: 90 min   Stress: No Stress Concern Present (9/24/2024)    [employer] - Occupational Stress Questionnaire     Feeling of Stress : Not at all   Housing Stability: High Risk (9/24/2024)    Housing Stability Vital Sign     Unable to Pay for Housing in the Last Year: Yes            Objective:   Vitals  Vitals:    01/14/25 0930 01/14/25 0932   BP: (!) 175/89 (!) 168/89   Pulse: 74    SpO2: 98%    Weight: 127.6 kg (281 lb 4.9 oz)    Height: 6' 1" (1.854 m)             Physical Exam  Vitals and nursing note reviewed.   Constitutional:       Appearance: Normal appearance.   Cardiovascular:      Rate and Rhythm: Normal rate and regular rhythm.      Heart sounds: No murmur heard.     No gallop.   Pulmonary:      Effort: Pulmonary effort is normal.      Breath sounds: Normal breath sounds. No " "wheezing or rales.   Abdominal:      General: Bowel sounds are normal. There is no distension.      Palpations: Abdomen is soft.      Tenderness: There is no abdominal tenderness.   Musculoskeletal:      Right lower leg: No edema.      Left lower leg: No edema.   Skin:     General: Skin is warm and dry.   Neurological:      Mental Status: He is alert and oriented to person, place, and time.         Lab Results    Lab Results   Component Value Date    WBC 5.49 11/26/2024    HGB 12.4 (L) 11/26/2024    HCT 37.8 (L) 11/26/2024    MCV 87 11/26/2024       Lab Results   Component Value Date     11/26/2024    INR 1.0 08/05/2022       Lab Results   Component Value Date    K 4.2 11/26/2024    MG 2.0 09/23/2022    BUN 12 11/26/2024    CREATININE 1.19 11/26/2024       Lab Results   Component Value Date     (H) 11/26/2024    HGBA1C 6.2 (H) 12/13/2024       Lab Results   Component Value Date    AST 41 11/26/2024    ALT 32 11/26/2024    ALBUMIN 4.2 11/26/2024    PROT 7.3 11/26/2024       Lab Results   Component Value Date    CHOL 217 (H) 12/13/2024    HDL 49 12/13/2024    LDLCALC 151.2 12/13/2024    TRIG 84 12/13/2024       No results found for: "CRP", "BNP"      Assessment:     1. Primary hypertension    2. Hypertension, unspecified type    3. Hyperlipidemia, unspecified hyperlipidemia type    4. Severe obesity (BMI 35.0-39.9) with comorbidity        Plan:     Bhupinder Garces is a pleasant 64 y.o. 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      HTN  -BP remains above goal  -escalate nifedipine to 90mg daily  -goal BP < 130/80  -home monitor, log  -educated on the importance of low sodium, heart healthy diet; increase frequency of CV exercise; weight loss  -will F/U close for ongoing escalation    2. HLD  -ASCVD 23.7 as below  -rec crestor 10  -update lipid panel, LFTs ~ 3m  -target LDLc < 100    3. preDM  -A1C 6.2  -dietary modification education  -stay active  -monitor " closely      The 10-year ASCVD risk score (Roscoe BARTON, et al., 2019) is: 26.3%    Values used to calculate the score:      Age: 64 years      Sex: Male      Is Non- : Yes      Diabetic: No      Tobacco smoker: No      Systolic Blood Pressure: 168 mmHg      Is BP treated: Yes      HDL Cholesterol: 49 mg/dL      Total Cholesterol: 217 mg/dL    No orders of the defined types were placed in this encounter.      He expressed verbal understanding and agreed with the plan    Follow up in 2-3 weeks  Labs ~ 3m    Pertinent cardiac images and EKG reviewed independently.    Continue with current medical plan and lifestyle changes.  Return sooner for concerns or questions. If symptoms persist go to the ED.  I have reviewed all pertinent data including patient's medical history in detail and updated the computerized patient record.     Counseling included discussion regarding imaging findings, diagnosis, possibilities, treatment options, risks and benefits.    Thank you for the opportunity to care for this patient. Will be available for questions if needed.      Signed:  Dre Carroll DNP  01/14/2025

## 2025-01-14 ENCOUNTER — OFFICE VISIT (OUTPATIENT)
Dept: CARDIOLOGY | Facility: CLINIC | Age: 65
End: 2025-01-14
Payer: MEDICARE

## 2025-01-14 VITALS
OXYGEN SATURATION: 98 % | SYSTOLIC BLOOD PRESSURE: 168 MMHG | HEIGHT: 73 IN | HEART RATE: 74 BPM | BODY MASS INDEX: 37.28 KG/M2 | DIASTOLIC BLOOD PRESSURE: 89 MMHG | WEIGHT: 281.31 LBS

## 2025-01-14 DIAGNOSIS — E66.01 SEVERE OBESITY (BMI 35.0-39.9) WITH COMORBIDITY: ICD-10-CM

## 2025-01-14 DIAGNOSIS — I10 HYPERTENSION, UNSPECIFIED TYPE: ICD-10-CM

## 2025-01-14 DIAGNOSIS — E78.5 HYPERLIPIDEMIA, UNSPECIFIED HYPERLIPIDEMIA TYPE: Chronic | ICD-10-CM

## 2025-01-14 DIAGNOSIS — I10 PRIMARY HYPERTENSION: Primary | Chronic | ICD-10-CM

## 2025-01-14 PROCEDURE — 99213 OFFICE O/P EST LOW 20 MIN: CPT | Mod: PBBFAC,PO

## 2025-01-14 PROCEDURE — 99999 PR PBB SHADOW E&M-EST. PATIENT-LVL III: CPT | Mod: PBBFAC,,,

## 2025-01-14 PROCEDURE — 99214 OFFICE O/P EST MOD 30 MIN: CPT | Mod: S$PBB,,,

## 2025-01-14 RX ORDER — CLOTRIMAZOLE AND BETAMETHASONE DIPROPIONATE 10; .64 MG/G; MG/G
CREAM TOPICAL 2 TIMES DAILY PRN
COMMUNITY
Start: 2025-01-03

## 2025-01-14 RX ORDER — NIFEDIPINE 90 MG/1
90 TABLET, EXTENDED RELEASE ORAL DAILY
Qty: 30 TABLET | Refills: 11 | Status: SHIPPED | OUTPATIENT
Start: 2025-01-14 | End: 2026-01-14

## 2025-01-24 NOTE — PROGRESS NOTES
"   Cardiology Clinic note    Subjective:   Patient ID:  Bhupinder Garces is a 64 y.o. male who presents for follow-up of HTN, CP    HPI    1/28/2025: Here for F/U HTN, HLD, CP. Unfortunately unable to tolerate crestor with reports of itchiness and worsening back pain since initiation. Also reports intermittent palpitations/GAONA with reports that BP cuff sometimes tells him "abnormal heart rhythm detected". No recurrent CP. BP significantly improved on max dose Nifedipine - will hold off on escalation for now. Patient denies CP, SOB, orthopnea, PND, syncope, LE edema. Reports compliance  with all medications.    1/14/2025: Here for F/U HTN, HLD. Seen in clinic unaccompanied reports overall doing well. Staying active, no recurrent CP. Remains HTN. Patient denies CP, SOB/GAONA, orthopnea, PND, syncope, palpitations, LE edema. Reports compliance and tolerance with all medications.    12/31/2024: Previous patient of Dr. Diane as below, initial visit for me. Here for F/U HTN, CP. Seen in clinic with spouse reports overall doing well. Staying active, goes to gym regularly, no recurrent CP/SOB. CV w/u unremarkable for cardiac etiology of CP, reviewed with patient as below. Remains HTN. Patient denies CP, SOB/GAONA, orthopnea, PND, syncope, palpitations, LE edema. Reports compliance and tolerance with all medications.    12/5/2024  Bhupinder Garces is a pleasant 64 y.o. 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 Patient reports he has had a few episodes of elevated HR subsequently having CP described as a pinch. Associated with SOB. Self resolved. He stop taking Diovan-HCTZ 2/2 increase urinary frequency and was started on diovan alone and developed a rash. Only taking amlodipine 5 mg qd. Denies cp, sob, palpitations, presyncope/dizziness, syncope, orthopnea, PND, bendopnea, decrease ET, NVDC, fever, chills.       CV Hx  ----------------------------------     ECG NSR with 1st AVB   "   ER note 11/26  he presents to the ED due to 2 weeks of intermittent left-sided chest pain that has become constant in the last 48 hours.  Notes nothing provokes or palliate the pain.  Notes it is sharp in nature.  Nonradiating.  Has intermittent dyspnea on exertion.  No cough or congestion.  No other symptoms reported.  Has a history of hypertension but denies hyperlipidemia or diabetes.  No cardiac history.  Does not smoke.         Stress Echo 12/27/2024    Post-stress Conclusion: The study is negative with no echocardiographic evidence of stress induced ischemia.    ECG Conclusion: The ECG portion of the study is negative for ischemia.    Stress ECG: There is no ST segment deviation identified during the protocol. During stress, rare PVCs are noted.    Echo 12/2024     Left Ventricle: The left ventricle is normal in size. There is concentric remodeling. There is normal systolic function. Quantitated ejection fraction is 65%. There is normal diastolic function.    Right Ventricle: Normal right ventricular cavity size. Systolic function is normal.    Aorta: Aortic root is normal in size measuring 3.19 cm. Ascending aorta is the upper limit of normal in size.    IVC/SVC: Normal venous pressure at 3 mmHg.    Echo 8/16/23    Left Ventricle: The left ventricle is normal in size. Normal wall thickness. Normal wall motion. There is normal systolic function. Ejection fraction by visual approximation is 65%. There is normal diastolic function.    Right Ventricle: Normal right ventricular cavity size. Wall thickness is normal. Right ventricle wall motion  is normal. Systolic function is normal.    Aortic Valve: The aortic valve is possibly a bicuspid valve.    Tricuspid Valve: There is mild regurgitation.    Pulmonic Valve: There is mild regurgitation.    IVC/SVC: Normal venous pressure at 3 mmHg.     History obtained by patient interview and supplemented by nursing documentation and chart review.   PMHx:  has a past  "medical history of Prostatitis.   SurgHx:  has a past surgical history that includes Colonoscopy (N/A, 5/7/2019).   FamHx: family history is not on file.   SocialHx:  reports that he has never smoked. He has never used smokeless tobacco. He reports that he does not drink alcohol and does not use drugs.    Past Medical History:   Diagnosis Date    Prostatitis           Patient Active Problem List    Diagnosis Date Noted    Abnormal heart rhythm 01/28/2025     Reports home BP monitor reports "abnormal heart rhythm detected"      Palpitations 01/28/2025    Severe obesity (BMI 35.0-39.9) with comorbidity 01/13/2025    Hyperlipemia 12/31/2024    Primary hypertension 12/31/2024    Pre-diabetes 12/31/2024     A1C 6.2      Primary osteoarthritis of right knee 10/08/2022    Screening for colon cancer 05/07/2019    BPH with urinary obstruction 06/05/2018       Patient's Medications   New Prescriptions    No medications on file   Previous Medications    AMMONIUM LACTATE (LAC-HYDRIN) 12 % LOTION    Apply topically 2 (two) times daily. To entire body.    CETIRIZINE (ZYRTEC) 10 MG TABLET    Take 1 tablet (10 mg total) by mouth every evening.    CLOBETASOL 0.05% (TEMOVATE) 0.05 % OINT    Apply topically 2 (two) times daily as needed (thick, itchy spots). Do not apply to face, armpits, or groin. Use for 2 weeks on, 1 week off, and repeat as needed.    CLOTRIMAZOLE-BETAMETHASONE 1-0.05% (LOTRISONE) CREAM    Apply topically 2 (two) times daily as needed.    HYDROCORTISONE 2.5 % CREAM    APPLY TOPICALLY TO THE AFFECTED AREA TWICE DAILY    LATANOPROST 0.005 % OPHTHALMIC SOLUTION    Place 1 drop into both eyes every evening.    NIFEDIPINE (PROCARDIA-XL) 90 MG (OSM) 24 HR TABLET    Take 1 tablet (90 mg total) by mouth once daily.    TAMSULOSIN (FLOMAX) 0.4 MG CAP    Take 1 capsule (0.4 mg total) by mouth once daily. 30 minutes after dinner    TIMOLOL MALEATE 0.5% (TIMOPTIC) 0.5 % DROP    Place 1 drop into both eyes every morning.    " VALACYCLOVIR (VALTREX) 1000 MG TABLET    Take 1 tablet (1,000 mg total) by mouth once daily.   Modified Medications    No medications on file   Discontinued Medications    ROSUVASTATIN (CRESTOR) 10 MG TABLET    Take 1 tablet (10 mg total) by mouth once daily.        Review of Systems   Constitutional: Negative for chills, decreased appetite, diaphoresis, malaise/fatigue, weight gain and weight loss.   Cardiovascular:  Positive for dyspnea on exertion and palpitations. Negative for chest pain, claudication, irregular heartbeat, leg swelling, near-syncope, orthopnea, paroxysmal nocturnal dyspnea and syncope.   Respiratory:  Negative for cough, hemoptysis, shortness of breath and snoring.    Musculoskeletal:  Positive for back pain.   Gastrointestinal:  Negative for bloating, abdominal pain, nausea and vomiting.   Neurological:  Negative for light-headedness and weakness.       No family history on file.    Social History     Socioeconomic History    Marital status:    Tobacco Use    Smoking status: Never    Smokeless tobacco: Never   Substance and Sexual Activity    Alcohol use: No    Drug use: Never     Social Drivers of Health     Financial Resource Strain: High Risk (9/24/2024)    Overall Financial Resource Strain (CARDIA)     Difficulty of Paying Living Expenses: Very hard   Food Insecurity: No Food Insecurity (9/24/2024)    Hunger Vital Sign     Worried About Running Out of Food in the Last Year: Never true     Ran Out of Food in the Last Year: Never true   Physical Activity: Sufficiently Active (9/24/2024)    Exercise Vital Sign     Days of Exercise per Week: 6 days     Minutes of Exercise per Session: 90 min   Stress: No Stress Concern Present (9/24/2024)    [employer] [place] [employer] - Occupational Stress Questionnaire     Feeling of Stress : Not at all   Housing Stability: High Risk (9/24/2024)    Housing Stability Vital Sign     Unable to Pay for Housing in the Last Year: Yes     "        Objective:   Vitals  Vitals:    01/28/25 0805 01/28/25 0806   BP: (!) 155/90 (!) 147/87   Pulse: 89    SpO2: 97%    Weight: 127.3 kg (280 lb 10.3 oz)               Physical Exam  Vitals and nursing note reviewed.   Constitutional:       Appearance: Normal appearance.   Cardiovascular:      Rate and Rhythm: Normal rate and regular rhythm.      Heart sounds: No murmur heard.     No gallop.   Pulmonary:      Effort: Pulmonary effort is normal.      Breath sounds: Normal breath sounds. No wheezing or rales.   Abdominal:      General: Bowel sounds are normal. There is no distension.      Palpations: Abdomen is soft.      Tenderness: There is no abdominal tenderness.   Musculoskeletal:      Right lower leg: No edema.      Left lower leg: No edema.   Skin:     General: Skin is warm and dry.   Neurological:      Mental Status: He is alert and oriented to person, place, and time.         Lab Results    Lab Results   Component Value Date    WBC 5.49 11/26/2024    HGB 12.4 (L) 11/26/2024    HCT 37.8 (L) 11/26/2024    MCV 87 11/26/2024       Lab Results   Component Value Date     11/26/2024    INR 1.0 08/05/2022       Lab Results   Component Value Date    K 4.2 11/26/2024    MG 2.0 09/23/2022    BUN 12 11/26/2024    CREATININE 1.19 11/26/2024       Lab Results   Component Value Date     (H) 11/26/2024    HGBA1C 6.2 (H) 12/13/2024       Lab Results   Component Value Date    AST 41 11/26/2024    ALT 32 11/26/2024    ALBUMIN 4.2 11/26/2024    PROT 7.3 11/26/2024       Lab Results   Component Value Date    CHOL 217 (H) 12/13/2024    HDL 49 12/13/2024    LDLCALC 151.2 12/13/2024    TRIG 84 12/13/2024       No results found for: "CRP", "BNP"      Assessment:     1. Primary hypertension    2. Hyperlipidemia, unspecified hyperlipidemia type    3. Severe obesity (BMI 35.0-39.9) with comorbidity    4. Cardiac arrhythmia, unspecified cardiac arrhythmia type    5. Palpitations        Plan:     Bhupinder Garces is a " pleasant 64 y.o. 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      HTN  -BP significantly improved  -continue nifedipine 90mg daily for now - will hold off on escalation for now given intermittent dizziness x2d  -goal BP < 130/80  -home monitor, log  -educated on the importance of low sodium, heart healthy diet; increase frequency of CV exercise; weight loss  -will F/U close for ongoing escalation    2. HLD  -unable to tolerate crestor with r/o worsening back pain, itchiness  -D/C for now - will likely rec initiation of Zetia once BP controlled     3. preDM  -A1C 6.2  -dietary modification education  -stay active  -monitor closely    4. Palpitations, abnormal heart rhythm  -48 hour holter for eval  -if unremarkable can can consider 30d JENNIFER        The 10-year ASCVD risk score (Roscoe BARTON, et al., 2019) is: 21%    Values used to calculate the score:      Age: 64 years      Sex: Male      Is Non- : Yes      Diabetic: No      Tobacco smoker: No      Systolic Blood Pressure: 147 mmHg      Is BP treated: Yes      HDL Cholesterol: 49 mg/dL      Total Cholesterol: 217 mg/dL    Orders Placed This Encounter   Procedures    Holter monitor - 48 hour     Standing Status:   Future     Standing Expiration Date:   1/28/2026     Order Specific Question:   Release to patient     Answer:   Immediate       He expressed verbal understanding and agreed with the plan    Follow up in 3 weeks  Labs ~ 3m    Pertinent cardiac images and EKG reviewed independently.    Continue with current medical plan and lifestyle changes.  Return sooner for concerns or questions. If symptoms persist go to the ED.  I have reviewed all pertinent data including patient's medical history in detail and updated the computerized patient record.     Counseling included discussion regarding imaging findings, diagnosis, possibilities, treatment options, risks and benefits.    Thank you for the  opportunity to care for this patient. Will be available for questions if needed.      Signed:  Dre Carroll DNP  01/28/2025

## 2025-01-28 ENCOUNTER — OFFICE VISIT (OUTPATIENT)
Dept: CARDIOLOGY | Facility: CLINIC | Age: 65
End: 2025-01-28
Payer: MEDICARE

## 2025-01-28 VITALS
DIASTOLIC BLOOD PRESSURE: 87 MMHG | WEIGHT: 280.63 LBS | OXYGEN SATURATION: 97 % | HEART RATE: 89 BPM | SYSTOLIC BLOOD PRESSURE: 147 MMHG | BODY MASS INDEX: 37.03 KG/M2

## 2025-01-28 DIAGNOSIS — I10 PRIMARY HYPERTENSION: Primary | Chronic | ICD-10-CM

## 2025-01-28 DIAGNOSIS — E78.5 HYPERLIPIDEMIA, UNSPECIFIED HYPERLIPIDEMIA TYPE: Chronic | ICD-10-CM

## 2025-01-28 DIAGNOSIS — E66.01 SEVERE OBESITY (BMI 35.0-39.9) WITH COMORBIDITY: ICD-10-CM

## 2025-01-28 DIAGNOSIS — R00.2 PALPITATIONS: ICD-10-CM

## 2025-01-28 DIAGNOSIS — I49.9 CARDIAC ARRHYTHMIA, UNSPECIFIED CARDIAC ARRHYTHMIA TYPE: Chronic | ICD-10-CM

## 2025-01-28 PROCEDURE — 99214 OFFICE O/P EST MOD 30 MIN: CPT | Mod: S$PBB,,,

## 2025-01-28 PROCEDURE — 99999 PR PBB SHADOW E&M-EST. PATIENT-LVL IV: CPT | Mod: PBBFAC,,,

## 2025-01-28 PROCEDURE — 99214 OFFICE O/P EST MOD 30 MIN: CPT | Mod: PBBFAC,PO

## 2025-02-06 ENCOUNTER — TELEPHONE (OUTPATIENT)
Dept: CARDIOLOGY | Facility: CLINIC | Age: 65
End: 2025-02-06
Payer: MEDICARE

## 2025-02-06 NOTE — TELEPHONE ENCOUNTER
Returned call to patient.  Assisted with rescheduling the holter monitor.  Confirmed appointment date, time and location with patient.    ----- Message from Carleen sent at 2/5/2025  1:11 PM CST -----  Type:  Sooner Apoointment Request    Caller is requesting a sooner appointment.  Caller declined first available appointment listed below.  Caller will not accept being placed on the waitlist and is requesting a message be sent to doctor.  Name of Caller: Pt  When is the first available appointment?  Symptoms: 48 hour Holter monitor appt  Would the patient rather a call back or a response via [facility]? [facility]  Best Call Back Number: [phone]  Additional Information:  Pt would like to schedule his appt.

## 2025-02-12 ENCOUNTER — HOSPITAL ENCOUNTER (OUTPATIENT)
Dept: CARDIOLOGY | Facility: HOSPITAL | Age: 65
Discharge: HOME | End: 2025-02-12
Payer: MEDICARE

## 2025-02-12 DIAGNOSIS — R00.2 PALPITATIONS: ICD-10-CM

## 2025-02-12 PROCEDURE — 93226 XTRNL ECG REC<48 HR SCAN A/R: CPT | Mod: PN

## 2025-02-17 NOTE — PROGRESS NOTES
"   Cardiology Clinic note    Subjective:   Patient ID:  Bhupinder Garces is a 64 y.o. male who presents for follow-up of HTN, CP    HPI    2/18/2025: Routine F/U. Seen in clinic unaccompanied, reports overall doing well. Staying active, working out regularly, reports has been doing more cardio on the treadmill of late. BPs much improved, compliant with home monitoring. Does endorse some ongoing LE pain since knee surgery but stable. No new CV s/s, no recurrent CP. Patient denies CP, SOB/GAONA, orthopnea, PND, syncope, palpitations, LE edema. Reports compliance and tolerance with all medications.    1/28/2025: Here for F/U HTN, HLD, CP. Unfortunately unable to tolerate crestor with reports of itchiness and worsening back pain since initiation. Also reports intermittent palpitations/GAONA with reports that BP cuff sometimes tells him "abnormal heart rhythm detected". No recurrent CP. BP significantly improved on max dose Nifedipine - will hold off on escalation for now. Patient denies CP, SOB, orthopnea, PND, syncope, LE edema. Reports compliance  with all medications.    1/14/2025: Here for F/U HTN, HLD. Seen in clinic unaccompanied reports overall doing well. Staying active, no recurrent CP. Remains HTN. Patient denies CP, SOB/GAONA, orthopnea, PND, syncope, palpitations, LE edema. Reports compliance and tolerance with all medications.    12/31/2024: Previous patient of Dr. Diane as below, initial visit for me. Here for F/U HTN, CP. Seen in clinic with spouse reports overall doing well. Staying active, goes to gym regularly, no recurrent CP/SOB. CV w/u unremarkable for cardiac etiology of CP, reviewed with patient as below. Remains HTN. Patient denies CP, SOB/GAONA, orthopnea, PND, syncope, palpitations, LE edema. Reports compliance and tolerance with all medications.    12/5/2024  Bhupinder Garces is a pleasant 64 y.o. 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 Patient reports " he has had a few episodes of elevated HR subsequently having CP described as a pinch. Associated with SOB. Self resolved. He stop taking Diovan-HCTZ 2/2 increase urinary frequency and was started on diovan alone and developed a rash. Only taking amlodipine 5 mg qd. Denies cp, sob, palpitations, presyncope/dizziness, syncope, orthopnea, PND, bendopnea, decrease ET, NVDC, fever, chills.       CV Hx  ----------------------------------     ECG NSR with 1st AVB     ER note 11/26  he presents to the ED due to 2 weeks of intermittent left-sided chest pain that has become constant in the last 48 hours.  Notes nothing provokes or palliate the pain.  Notes it is sharp in nature.  Nonradiating.  Has intermittent dyspnea on exertion.  No cough or congestion.  No other symptoms reported.  Has a history of hypertension but denies hyperlipidemia or diabetes.  No cardiac history.  Does not smoke.         Stress Echo 12/27/2024    Post-stress Conclusion: The study is negative with no echocardiographic evidence of stress induced ischemia.    ECG Conclusion: The ECG portion of the study is negative for ischemia.    Stress ECG: There is no ST segment deviation identified during the protocol. During stress, rare PVCs are noted.    Echo 12/2024     Left Ventricle: The left ventricle is normal in size. There is concentric remodeling. There is normal systolic function. Quantitated ejection fraction is 65%. There is normal diastolic function.    Right Ventricle: Normal right ventricular cavity size. Systolic function is normal.    Aorta: Aortic root is normal in size measuring 3.19 cm. Ascending aorta is the upper limit of normal in size.    IVC/SVC: Normal venous pressure at 3 mmHg.    Echo 8/16/23    Left Ventricle: The left ventricle is normal in size. Normal wall thickness. Normal wall motion. There is normal systolic function. Ejection fraction by visual approximation is 65%. There is normal diastolic function.    Right Ventricle:  "Normal right ventricular cavity size. Wall thickness is normal. Right ventricle wall motion  is normal. Systolic function is normal.    Aortic Valve: The aortic valve is possibly a bicuspid valve.    Tricuspid Valve: There is mild regurgitation.    Pulmonic Valve: There is mild regurgitation.    IVC/SVC: Normal venous pressure at 3 mmHg.     History obtained by patient interview and supplemented by nursing documentation and chart review.   PMHx:  has a past medical history of Prostatitis.   SurgHx:  has a past surgical history that includes Colonoscopy (N/A, 5/7/2019).   FamHx: family history is not on file.   SocialHx:  reports that he has never smoked. He has never used smokeless tobacco. He reports that he does not drink alcohol and does not use drugs.    Past Medical History:   Diagnosis Date    Prostatitis           Patient Active Problem List    Diagnosis Date Noted    Abnormal heart rhythm 01/28/2025     Reports home BP monitor reports "abnormal heart rhythm detected"      Palpitations 01/28/2025    Severe obesity (BMI 35.0-39.9) with comorbidity 01/13/2025    Hyperlipemia 12/31/2024    Primary hypertension 12/31/2024    Pre-diabetes 12/31/2024     A1C 6.2      Primary osteoarthritis of right knee 10/08/2022    Screening for colon cancer 05/07/2019    BPH with urinary obstruction 06/05/2018       Patient's Medications   New Prescriptions    No medications on file   Previous Medications    AMMONIUM LACTATE (LAC-HYDRIN) 12 % LOTION    Apply topically 2 (two) times daily. To entire body.    CETIRIZINE (ZYRTEC) 10 MG TABLET    Take 1 tablet (10 mg total) by mouth every evening.    CLOBETASOL 0.05% (TEMOVATE) 0.05 % OINT    Apply topically 2 (two) times daily as needed (thick, itchy spots). Do not apply to face, armpits, or groin. Use for 2 weeks on, 1 week off, and repeat as needed.    CLOTRIMAZOLE-BETAMETHASONE 1-0.05% (LOTRISONE) CREAM    Apply topically 2 (two) times daily as needed.    HYDROCORTISONE 2.5 % " CREAM    APPLY TOPICALLY TO THE AFFECTED AREA TWICE DAILY    LATANOPROST 0.005 % OPHTHALMIC SOLUTION    Place 1 drop into both eyes every evening.    NIFEDIPINE (PROCARDIA-XL) 90 MG (OSM) 24 HR TABLET    Take 1 tablet (90 mg total) by mouth once daily.    TAMSULOSIN (FLOMAX) 0.4 MG CAP    Take 1 capsule (0.4 mg total) by mouth once daily. 30 minutes after dinner    TIMOLOL MALEATE 0.5% (TIMOPTIC) 0.5 % DROP    Place 1 drop into both eyes every morning.    VALACYCLOVIR (VALTREX) 1000 MG TABLET    Take 1 tablet (1,000 mg total) by mouth once daily.   Modified Medications    No medications on file   Discontinued Medications    No medications on file        Review of Systems   Constitutional: Negative for chills, decreased appetite, diaphoresis, malaise/fatigue, weight gain and weight loss.   Cardiovascular:  Positive for palpitations. Negative for chest pain, claudication, dyspnea on exertion, irregular heartbeat, leg swelling, near-syncope, orthopnea, paroxysmal nocturnal dyspnea and syncope.   Respiratory:  Negative for cough, hemoptysis, shortness of breath and snoring.    Musculoskeletal:  Positive for back pain and joint pain.   Gastrointestinal:  Negative for bloating, abdominal pain, nausea and vomiting.   Neurological:  Negative for light-headedness and weakness.       No family history on file.    Social History     Socioeconomic History    Marital status:    Tobacco Use    Smoking status: Never    Smokeless tobacco: Never   Substance and Sexual Activity    Alcohol use: No    Drug use: Never     Social Drivers of Health     Financial Resource Strain: High Risk (9/24/2024)    Overall Financial Resource Strain (CARDIA)     Difficulty of Paying Living Expenses: Very hard   Food Insecurity: No Food Insecurity (9/24/2024)    Hunger Vital Sign     Worried About Running Out of Food in the Last Year: Never true     Ran Out of Food in the Last Year: Never true   Physical Activity: Sufficiently Active (9/24/2024)  "   Exercise Vital Sign     Days of Exercise per Week: 6 days     Minutes of Exercise per Session: 90 min   Stress: No Stress Concern Present (9/24/2024)    [employer] - Occupational Stress Questionnaire     Feeling of Stress : Not at all   Housing Stability: High Risk (9/24/2024)    Housing Stability Vital Sign     Unable to Pay for Housing in the Last Year: Yes            Objective:   Vitals  Vitals:    02/18/25 1438 02/18/25 1441   BP: 135/73 138/78   Pulse: 86    SpO2: 97%    Weight: 128 kg (282 lb 1.6 oz)    Height: 6' 1" (1.854 m)                 Physical Exam  Vitals and nursing note reviewed.   Constitutional:       Appearance: Normal appearance.   Cardiovascular:      Rate and Rhythm: Normal rate and regular rhythm.      Heart sounds: No murmur heard.     No gallop.   Pulmonary:      Effort: Pulmonary effort is normal.      Breath sounds: Normal breath sounds. No wheezing or rales.   Abdominal:      General: Bowel sounds are normal. There is no distension.      Palpations: Abdomen is soft.      Tenderness: There is no abdominal tenderness.   Musculoskeletal:      Right lower leg: No edema.      Left lower leg: No edema.   Skin:     General: Skin is warm and dry.   Neurological:      Mental Status: He is alert and oriented to person, place, and time.         Lab Results    Lab Results   Component Value Date    WBC 5.49 11/26/2024    HGB 12.4 (L) 11/26/2024    HCT 37.8 (L) 11/26/2024    MCV 87 11/26/2024       Lab Results   Component Value Date     11/26/2024    INR 1.0 08/05/2022       Lab Results   Component Value Date    K 4.2 11/26/2024    MG 2.0 09/23/2022    BUN 12 11/26/2024    CREATININE 1.19 11/26/2024       Lab Results   Component Value Date     (H) 11/26/2024    HGBA1C 6.2 (H) 12/13/2024       Lab Results   Component Value Date    AST 41 11/26/2024    ALT 32 11/26/2024    ALBUMIN 4.2 11/26/2024    PROT 7.3 11/26/2024       Lab Results   Component Value " "Date    CHOL 217 (H) 12/13/2024    HDL 49 12/13/2024    LDLCALC 151.2 12/13/2024    TRIG 84 12/13/2024       No results found for: "CRP", "BNP"      Assessment:     1. Primary hypertension    2. Palpitations    3. Hyperlipidemia, unspecified hyperlipidemia type    4. Cardiac arrhythmia, unspecified cardiac arrhythmia type    5. Severe obesity (BMI 35.0-39.9) with comorbidity    6. Pre-diabetes          Plan:     Bhupinder Garces is a pleasant 64 y.o. 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      HTN  -BP significantly improved  -continue nifedipine 90mg daily for now   -goal BP < 130/80  -home monitor, log  -educated on the importance of low sodium, heart healthy diet; increase frequency of CV exercise; weight loss    2. HLD  -unable to tolerate crestor with r/o worsening back pain, itchiness  -initiate Zetia, OTC fish oil  -dietary modifications as above; F/U labs ~ 3m as ordered    3. preDM  -A1C 6.2  -dietary modification education  -stay active  -monitor closely    4. Palpitations, abnormal heart rhythm  -48 hour holter pending        The 10-year ASCVD risk score (Roscoe DK, et al., 2019) is: 18.9%    Values used to calculate the score:      Age: 64 years      Sex: Male      Is Non- : Yes      Diabetic: No      Tobacco smoker: No      Systolic Blood Pressure: 138 mmHg      Is BP treated: Yes      HDL Cholesterol: 49 mg/dL      Total Cholesterol: 217 mg/dL    No orders of the defined types were placed in this encounter.      He expressed verbal understanding and agreed with the plan    -F/U 3m or sooner PRN    Pertinent cardiac images and EKG reviewed independently.    Continue with current medical plan and lifestyle changes.  Return sooner for concerns or questions. If symptoms persist go to the ED.  I have reviewed all pertinent data including patient's medical history in detail and updated the computerized patient record.     Counseling " included discussion regarding imaging findings, diagnosis, possibilities, treatment options, risks and benefits.    Thank you for the opportunity to care for this patient. Will be available for questions if needed.      Signed:  Dre Carroll DNP  02/18/2025

## 2025-02-18 ENCOUNTER — OFFICE VISIT (OUTPATIENT)
Dept: CARDIOLOGY | Facility: CLINIC | Age: 65
End: 2025-02-18
Payer: MEDICARE

## 2025-02-18 VITALS
OXYGEN SATURATION: 97 % | DIASTOLIC BLOOD PRESSURE: 78 MMHG | WEIGHT: 282.13 LBS | BODY MASS INDEX: 37.39 KG/M2 | HEART RATE: 86 BPM | HEIGHT: 73 IN | SYSTOLIC BLOOD PRESSURE: 138 MMHG

## 2025-02-18 DIAGNOSIS — E66.01 SEVERE OBESITY (BMI 35.0-39.9) WITH COMORBIDITY: ICD-10-CM

## 2025-02-18 DIAGNOSIS — I49.9 CARDIAC ARRHYTHMIA, UNSPECIFIED CARDIAC ARRHYTHMIA TYPE: Chronic | ICD-10-CM

## 2025-02-18 DIAGNOSIS — E78.5 HYPERLIPIDEMIA, UNSPECIFIED HYPERLIPIDEMIA TYPE: Chronic | ICD-10-CM

## 2025-02-18 DIAGNOSIS — R00.2 PALPITATIONS: ICD-10-CM

## 2025-02-18 DIAGNOSIS — R73.03 PRE-DIABETES: ICD-10-CM

## 2025-02-18 DIAGNOSIS — I10 PRIMARY HYPERTENSION: Primary | Chronic | ICD-10-CM

## 2025-02-18 PROCEDURE — 99213 OFFICE O/P EST LOW 20 MIN: CPT | Mod: PBBFAC,PO

## 2025-02-18 RX ORDER — EZETIMIBE 10 MG/1
10 TABLET ORAL DAILY
Qty: 90 TABLET | Refills: 3 | Status: SHIPPED | OUTPATIENT
Start: 2025-02-18 | End: 2026-02-18

## 2025-02-20 ENCOUNTER — RESULTS FOLLOW-UP (OUTPATIENT)
Dept: CARDIOLOGY | Facility: CLINIC | Age: 65
End: 2025-02-20

## 2025-04-03 ENCOUNTER — LAB VISIT (OUTPATIENT)
Dept: LAB | Facility: HOSPITAL | Age: 65
End: 2025-04-03
Payer: MEDICARE

## 2025-04-03 ENCOUNTER — OFFICE VISIT (OUTPATIENT)
Dept: CARDIOLOGY | Facility: CLINIC | Age: 65
End: 2025-04-03
Payer: MEDICARE

## 2025-04-03 VITALS
HEIGHT: 73 IN | BODY MASS INDEX: 36.79 KG/M2 | OXYGEN SATURATION: 96 % | HEART RATE: 100 BPM | WEIGHT: 277.56 LBS | DIASTOLIC BLOOD PRESSURE: 76 MMHG | SYSTOLIC BLOOD PRESSURE: 145 MMHG

## 2025-04-03 DIAGNOSIS — I73.9 CLAUDICATION: ICD-10-CM

## 2025-04-03 DIAGNOSIS — E78.5 HYPERLIPIDEMIA, UNSPECIFIED HYPERLIPIDEMIA TYPE: Chronic | ICD-10-CM

## 2025-04-03 DIAGNOSIS — R07.89 OTHER CHEST PAIN: ICD-10-CM

## 2025-04-03 DIAGNOSIS — R73.03 PRE-DIABETES: ICD-10-CM

## 2025-04-03 DIAGNOSIS — R07.9 CHEST PAIN, UNSPECIFIED TYPE: ICD-10-CM

## 2025-04-03 DIAGNOSIS — R06.09 DOE (DYSPNEA ON EXERTION): ICD-10-CM

## 2025-04-03 DIAGNOSIS — I10 PRIMARY HYPERTENSION: Primary | Chronic | ICD-10-CM

## 2025-04-03 DIAGNOSIS — E66.01 SEVERE OBESITY (BMI 35.0-39.9) WITH COMORBIDITY: ICD-10-CM

## 2025-04-03 LAB
ALBUMIN SERPL BCP-MCNC: 3.9 G/DL (ref 3.5–5.2)
ALP SERPL-CCNC: 57 UNIT/L (ref 40–150)
ALT SERPL W/O P-5'-P-CCNC: 21 UNIT/L (ref 10–44)
ANION GAP (OHS): 8 MMOL/L (ref 8–16)
AST SERPL-CCNC: 29 UNIT/L (ref 11–45)
BILIRUB SERPL-MCNC: 0.5 MG/DL (ref 0.1–1)
BUN SERPL-MCNC: 14 MG/DL (ref 8–23)
CALCIUM SERPL-MCNC: 9.2 MG/DL (ref 8.7–10.5)
CHLORIDE SERPL-SCNC: 106 MMOL/L (ref 95–110)
CO2 SERPL-SCNC: 21 MMOL/L (ref 23–29)
CREAT SERPL-MCNC: 1.4 MG/DL (ref 0.5–1.4)
GFR SERPLBLD CREATININE-BSD FMLA CKD-EPI: 56 ML/MIN/1.73/M2
GLUCOSE SERPL-MCNC: 131 MG/DL (ref 70–110)
MAGNESIUM SERPL-MCNC: 1.9 MG/DL (ref 1.6–2.6)
POTASSIUM SERPL-SCNC: 4 MMOL/L (ref 3.5–5.1)
PROT SERPL-MCNC: 7.6 GM/DL (ref 6–8.4)
SODIUM SERPL-SCNC: 135 MMOL/L (ref 136–145)

## 2025-04-03 PROCEDURE — 82374 ASSAY BLOOD CARBON DIOXIDE: CPT

## 2025-04-03 PROCEDURE — 36415 COLL VENOUS BLD VENIPUNCTURE: CPT

## 2025-04-03 PROCEDURE — 93005 ELECTROCARDIOGRAM TRACING: CPT | Mod: PBBFAC,PN | Performed by: INTERNAL MEDICINE

## 2025-04-03 PROCEDURE — 99999 PR PBB SHADOW E&M-EST. PATIENT-LVL IV: CPT | Mod: PBBFAC,,,

## 2025-04-03 PROCEDURE — 99214 OFFICE O/P EST MOD 30 MIN: CPT | Mod: PBBFAC,PN

## 2025-04-03 PROCEDURE — 83735 ASSAY OF MAGNESIUM: CPT

## 2025-04-03 NOTE — PROGRESS NOTES
"   Cardiology Clinic note    Subjective:   Patient ID:  Bhupinder Garces is a 64 y.o. male who presents for follow-up of HTN, CP    HPI    4/3/2025: Here for F/U. Seen in clinic unaccompanied, reports overall doing okay. Reports over the past few days has noticed some chest discomfort, nagging/sharp, more noticeable with exertion. Also with c/o worsening GAONA over the past few days. No other associated s/s, no significant resting SOB/CP. Also endorses ongoing, worsening thigh soreness/cramping. Has not been able to go to the gym as much of late. Denies resting CP, SOB, orthopnea, PND, syncope, palps, LE edema. Reports compliance with all medications.      2/18/2025: Routine F/U. Seen in clinic unaccompanied, reports overall doing well. Staying active, working out regularly, reports has been doing more cardio on the treadmill of late. BPs much improved, compliant with home monitoring. Does endorse some ongoing LE pain since knee surgery but stable. No new CV s/s, no recurrent CP. Patient denies CP, SOB/GAONA, orthopnea, PND, syncope, palpitations, LE edema. Reports compliance and tolerance with all medications.    1/28/2025: Here for F/U HTN, HLD, CP. Unfortunately unable to tolerate crestor with reports of itchiness and worsening back pain since initiation. Also reports intermittent palpitations/GAONA with reports that BP cuff sometimes tells him "abnormal heart rhythm detected". No recurrent CP. BP significantly improved on max dose Nifedipine - will hold off on escalation for now. Patient denies CP, SOB, orthopnea, PND, syncope, LE edema. Reports compliance  with all medications.    1/14/2025: Here for F/U HTN, HLD. Seen in clinic unaccompanied reports overall doing well. Staying active, no recurrent CP. Remains HTN. Patient denies CP, SOB/GAONA, orthopnea, PND, syncope, palpitations, LE edema. Reports compliance and tolerance with all medications.    12/31/2024: Previous patient of Dr. Diane as below, initial visit for " me. Here for F/U HTN, CP. Seen in clinic with spouse reports overall doing well. Staying active, goes to gym regularly, no recurrent CP/SOB. CV w/u unremarkable for cardiac etiology of CP, reviewed with patient as below. Remains HTN. Patient denies CP, SOB/GAONA, orthopnea, PND, syncope, palpitations, LE edema. Reports compliance and tolerance with all medications.    12/5/2024  Bhupinder Garces is a pleasant 64 y.o. 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 Patient reports he has had a few episodes of elevated HR subsequently having CP described as a pinch. Associated with SOB. Self resolved. He stop taking Diovan-HCTZ 2/2 increase urinary frequency and was started on diovan alone and developed a rash. Only taking amlodipine 5 mg qd. Denies cp, sob, palpitations, presyncope/dizziness, syncope, orthopnea, PND, bendopnea, decrease ET, NVDC, fever, chills.       CV Hx  ----------------------------------     ECG NSR with 1st AVB     ER note 11/26  he presents to the ED due to 2 weeks of intermittent left-sided chest pain that has become constant in the last 48 hours.  Notes nothing provokes or palliate the pain.  Notes it is sharp in nature.  Nonradiating.  Has intermittent dyspnea on exertion.  No cough or congestion.  No other symptoms reported.  Has a history of hypertension but denies hyperlipidemia or diabetes.  No cardiac history.  Does not smoke.         Stress Echo 12/27/2024    Post-stress Conclusion: The study is negative with no echocardiographic evidence of stress induced ischemia.    ECG Conclusion: The ECG portion of the study is negative for ischemia.    Stress ECG: There is no ST segment deviation identified during the protocol. During stress, rare PVCs are noted.    Echo 12/2024     Left Ventricle: The left ventricle is normal in size. There is concentric remodeling. There is normal systolic function. Quantitated ejection fraction is 65%. There is normal  "diastolic function.    Right Ventricle: Normal right ventricular cavity size. Systolic function is normal.    Aorta: Aortic root is normal in size measuring 3.19 cm. Ascending aorta is the upper limit of normal in size.    IVC/SVC: Normal venous pressure at 3 mmHg.    Echo 8/16/23    Left Ventricle: The left ventricle is normal in size. Normal wall thickness. Normal wall motion. There is normal systolic function. Ejection fraction by visual approximation is 65%. There is normal diastolic function.    Right Ventricle: Normal right ventricular cavity size. Wall thickness is normal. Right ventricle wall motion  is normal. Systolic function is normal.    Aortic Valve: The aortic valve is possibly a bicuspid valve.    Tricuspid Valve: There is mild regurgitation.    Pulmonic Valve: There is mild regurgitation.    IVC/SVC: Normal venous pressure at 3 mmHg.     History obtained by patient interview and supplemented by nursing documentation and chart review.   PMHx:  has a past medical history of Prostatitis.   SurgHx:  has a past surgical history that includes Colonoscopy (N/A, 5/7/2019).   FamHx: family history is not on file.   SocialHx:  reports that he has never smoked. He has never used smokeless tobacco. He reports that he does not drink alcohol and does not use drugs.    Past Medical History:   Diagnosis Date    Prostatitis           Patient Active Problem List    Diagnosis Date Noted    GAONA (dyspnea on exertion) 04/03/2025    Other chest pain 04/03/2025    Abnormal heart rhythm 01/28/2025     Reports home BP monitor reports "abnormal heart rhythm detected"      Palpitations 01/28/2025    Severe obesity (BMI 35.0-39.9) with comorbidity 01/13/2025    Hyperlipemia 12/31/2024    Primary hypertension 12/31/2024    Pre-diabetes 12/31/2024     A1C 6.2      Primary osteoarthritis of right knee 10/08/2022    Screening for colon cancer 05/07/2019    BPH with urinary obstruction 06/05/2018       Patient's Medications   New " Prescriptions    No medications on file   Previous Medications    AMMONIUM LACTATE (LAC-HYDRIN) 12 % LOTION    Apply topically 2 (two) times daily. To entire body.    CETIRIZINE (ZYRTEC) 10 MG TABLET    Take 1 tablet (10 mg total) by mouth every evening.    CLOBETASOL 0.05% (TEMOVATE) 0.05 % OINT    Apply topically 2 (two) times daily as needed (thick, itchy spots). Do not apply to face, armpits, or groin. Use for 2 weeks on, 1 week off, and repeat as needed.    CLOTRIMAZOLE-BETAMETHASONE 1-0.05% (LOTRISONE) CREAM    Apply topically 2 (two) times daily as needed.    HYDROCORTISONE 2.5 % CREAM    APPLY TOPICALLY TO THE AFFECTED AREA TWICE DAILY    LATANOPROST 0.005 % OPHTHALMIC SOLUTION    Place 1 drop into both eyes every evening.    NIFEDIPINE (PROCARDIA-XL) 90 MG (OSM) 24 HR TABLET    Take 1 tablet (90 mg total) by mouth once daily.    TAMSULOSIN (FLOMAX) 0.4 MG CAP    Take 1 capsule (0.4 mg total) by mouth once daily. 30 minutes after dinner    TIMOLOL MALEATE 0.5% (TIMOPTIC) 0.5 % DROP    Place 1 drop into both eyes every morning.    VALACYCLOVIR (VALTREX) 1000 MG TABLET    Take 1 tablet (1,000 mg total) by mouth once daily.   Modified Medications    No medications on file   Discontinued Medications    EZETIMIBE (ZETIA) 10 MG TABLET    Take 1 tablet (10 mg total) by mouth once daily.        Review of Systems   Constitutional: Negative for chills, decreased appetite, diaphoresis, malaise/fatigue, weight gain and weight loss.   Cardiovascular:  Positive for chest pain, claudication and dyspnea on exertion. Negative for irregular heartbeat, leg swelling, near-syncope, orthopnea, palpitations, paroxysmal nocturnal dyspnea and syncope.   Respiratory:  Negative for cough, hemoptysis, shortness of breath and snoring.    Musculoskeletal:  Positive for back pain, joint pain and myalgias.   Gastrointestinal:  Negative for bloating, abdominal pain, nausea and vomiting.   Neurological:  Negative for light-headedness and  "weakness.       No family history on file.    Social History     Socioeconomic History    Marital status:    Tobacco Use    Smoking status: Never    Smokeless tobacco: Never   Substance and Sexual Activity    Alcohol use: No    Drug use: Never     Social Drivers of Health     Financial Resource Strain: High Risk (9/24/2024)    Overall Financial Resource Strain (CARDIA)     Difficulty of Paying Living Expenses: Very hard   Food Insecurity: No Food Insecurity (9/24/2024)    Hunger Vital Sign     Worried About Running Out of Food in the Last Year: Never true     Ran Out of Food in the Last Year: Never true   Physical Activity: Sufficiently Active (9/24/2024)    Exercise Vital Sign     Days of Exercise per Week: 6 days     Minutes of Exercise per Session: 90 min   Stress: No Stress Concern Present (9/24/2024)    [employer] - Occupational Stress Questionnaire     Feeling of Stress : Not at all   Housing Stability: High Risk (9/24/2024)    Housing Stability Vital Sign     Unable to Pay for Housing in the Last Year: Yes            Objective:   Vitals  Vitals:    04/03/25 1419 04/03/25 1422   BP: (!) 159/84 (!) 145/76   Pulse: 100    SpO2: 96%    Weight: 125.9 kg (277 lb 9 oz)    Height: 6' 1" (1.854 m)                   Physical Exam  Vitals and nursing note reviewed.   Constitutional:       Appearance: Normal appearance.   Cardiovascular:      Rate and Rhythm: Normal rate and regular rhythm.      Heart sounds: No murmur heard.     No gallop.   Pulmonary:      Effort: Pulmonary effort is normal.      Breath sounds: Normal breath sounds. No wheezing or rales.   Abdominal:      General: Bowel sounds are normal. There is no distension.      Palpations: Abdomen is soft.      Tenderness: There is no abdominal tenderness.   Musculoskeletal:      Right lower leg: No edema.      Left lower leg: No edema.   Skin:     General: Skin is warm and dry.   Neurological:      Mental Status: He is alert " "and oriented to person, place, and time.         Lab Results    Lab Results   Component Value Date    WBC 5.49 11/26/2024    HGB 12.4 (L) 11/26/2024    HCT 37.8 (L) 11/26/2024    MCV 87 11/26/2024       Lab Results   Component Value Date     11/26/2024    INR 1.0 08/05/2022       Lab Results   Component Value Date    K 4.2 11/26/2024    MG 2.0 09/23/2022    BUN 12 11/26/2024    CREATININE 1.19 11/26/2024       Lab Results   Component Value Date     (H) 11/26/2024    HGBA1C 6.2 (H) 12/13/2024       Lab Results   Component Value Date    AST 41 11/26/2024    ALT 32 11/26/2024    ALBUMIN 4.2 11/26/2024    PROT 7.3 11/26/2024       Lab Results   Component Value Date    CHOL 217 (H) 12/13/2024    HDL 49 12/13/2024    LDLCALC 151.2 12/13/2024    TRIG 84 12/13/2024       No results found for: "CRP", "BNP"      Assessment:     1. Primary hypertension    2. Hyperlipidemia, unspecified hyperlipidemia type    3. GAONA (dyspnea on exertion)    4. Chest pain, unspecified type    5. Severe obesity (BMI 35.0-39.9) with comorbidity    6. Pre-diabetes    7. Other chest pain          Plan:        HTN  -BP significantly improved  -continue nifedipine 90mg daily for now   -goal BP < 130/80  -home monitor, log  -educated on the importance of low sodium, heart healthy diet; increase frequency of CV exercise; weight loss    2. HLD  -unable to tolerate crestor with c/o worsening back pain, itchiness  -now with c/o muscle pain/soreness and cramping with Zetia  -D/C Zetia for now - continue OTC fish oil - can consider initiation of fenofibrate once muscle symptoms resolve  -dietary modifications as above    3. preDM  -A1C 6.2  -dietary modification education  -stay active  -monitor closely    4. CP, GAONA  -concerning exertional s/s  -rec Nuc stress to r/o ischemic etiology    5. Claudication  -muscle cramping, worsening exertional pain as above  -D/C Zetia  -DENISE to r/o PAD        -DENISE  -Nuc Stress  -F/U after imaging        The " 10-year ASCVD risk score (Roscoe BARTON, et al., 2019) is: 20.5%    Values used to calculate the score:      Age: 64 years      Sex: Male      Is Non- : Yes      Diabetic: No      Tobacco smoker: No      Systolic Blood Pressure: 145 mmHg      Is BP treated: Yes      HDL Cholesterol: 49 mg/dL      Total Cholesterol: 217 mg/dL    Orders Placed This Encounter   Procedures    Comprehensive Metabolic Panel     Standing Status:   Future     Expected Date:   4/3/2025     Expiration Date:   7/2/2026     Send normal result to authorizing provider's In Basket if patient is active on MyChart::   Yes    Magnesium     Standing Status:   Future     Expected Date:   4/3/2025     Expiration Date:   7/2/2026     Send normal result to authorizing provider's In Basket if patient is active on MyChart::   Yes    Ankle Brachial Indices (DENISE)     Standing Status:   Future     Expiration Date:   4/3/2026     Exam Type::   Exercise with toe tracings     Release to patient:   Immediate    Nuclear Stress - Cardiology Interpreted     Standing Status:   Future     Expiration Date:   4/3/2026     Which stress agent will be used?:   Exercise     Release to patient:   Immediate    IN OFFICE EKG 12-LEAD (to [place])    IN OFFICE EKG 12-LEAD (to [place])     Standing Status:   Future     Expected Date:   4/3/2025     Expiration Date:   4/3/2026       He expressed verbal understanding and agreed with the plan      Pertinent cardiac images and EKG reviewed independently.    Continue with current medical plan and lifestyle changes.  Return sooner for concerns or questions. If symptoms persist go to the ED.  I have reviewed all pertinent data including patient's medical history in detail and updated the computerized patient record.     Counseling included discussion regarding imaging findings, diagnosis, possibilities, treatment options, risks and benefits.    Thank you for the opportunity to care for this patient. Will be available for  questions if needed.      Signed:  Dre Carroll DNP  04/03/2025

## 2025-04-04 ENCOUNTER — RESULTS FOLLOW-UP (OUTPATIENT)
Dept: CARDIOLOGY | Facility: CLINIC | Age: 65
End: 2025-04-04

## 2025-04-04 LAB
OHS QRS DURATION: 68 MS
OHS QTC CALCULATION: 389 MS

## 2025-04-22 ENCOUNTER — HOSPITAL ENCOUNTER (OUTPATIENT)
Dept: CARDIOLOGY | Facility: HOSPITAL | Age: 65
Discharge: HOME | End: 2025-04-22
Payer: MEDICARE

## 2025-04-22 DIAGNOSIS — E78.5 HYPERLIPIDEMIA, UNSPECIFIED HYPERLIPIDEMIA TYPE: Chronic | ICD-10-CM

## 2025-04-22 LAB
ABI POST MINUTES1: 2 MIN
ABI POST MINUTES2: 3 MIN
IMMEDIATE ARM BP: 160 MMHG
IMMEDIATE LEFT ABI: 1.18
IMMEDIATE LEFT TIBIAL: 189 MMHG
IMMEDIATE RIGHT ABI: 1.06
IMMEDIATE RIGHT TIBIAL: 169 MMHG
LEFT ABI: 1.16
LEFT ARM BP: 150 MMHG
LEFT DORSALIS PEDIS: 173 MMHG
LEFT POSTERIOR TIBIAL: 174 MMHG
LEFT TBI: 1.23
LEFT TOE PRESSURE: 185 MMHG
POST1 ARM BP: 148 MMHG
POST1 LEFT ABI: 1.25
POST1 LEFT TIBIAL: 185 MMHG
POST1 RIGHT ABI: 1.27
POST1 RIGHT TIBIAL: 188 MMHG
POST2 ARM BP: 132 MMHG
POST2 LEFT ABI: 1.42
POST2 LEFT TIBIAL: 188 MMHG
POST2 RIGHT ABI: 1.3
POST2 RIGHT TIBIAL: 171 MMHG
RIGHT ABI: 1.17
RIGHT ARM BP: 136 MMHG
RIGHT DORSALIS PEDIS: 176 MMHG
RIGHT POSTERIOR TIBIAL: 171 MMHG
RIGHT TBI: 1.13
RIGHT TOE PRESSURE: 170 MMHG
TREADMILL GRADE: 10 %
TREADMILL SPEED: 2 MPH
TREADMILL TIME: 5 MIN

## 2025-04-22 PROCEDURE — 93924 LWR XTR VASC STDY BILAT: CPT

## 2025-04-22 PROCEDURE — 93924 LWR XTR VASC STDY BILAT: CPT | Mod: 26,,, | Performed by: INTERNAL MEDICINE

## 2025-05-22 ENCOUNTER — OFFICE VISIT (OUTPATIENT)
Dept: UROLOGY | Facility: CLINIC | Age: 65
End: 2025-05-22
Payer: MEDICARE

## 2025-05-22 ENCOUNTER — LAB VISIT (OUTPATIENT)
Dept: LAB | Facility: HOSPITAL | Age: 65
End: 2025-05-22
Payer: MEDICARE

## 2025-05-22 VITALS
SYSTOLIC BLOOD PRESSURE: 142 MMHG | BODY MASS INDEX: 37.13 KG/M2 | HEIGHT: 73 IN | DIASTOLIC BLOOD PRESSURE: 81 MMHG | HEART RATE: 79 BPM | WEIGHT: 280.19 LBS

## 2025-05-22 DIAGNOSIS — N13.8 BPH WITH URINARY OBSTRUCTION: ICD-10-CM

## 2025-05-22 DIAGNOSIS — L29.9 ITCHING: ICD-10-CM

## 2025-05-22 DIAGNOSIS — N40.1 BPH WITH URINARY OBSTRUCTION: ICD-10-CM

## 2025-05-22 DIAGNOSIS — R10.30 INGUINAL PAIN, UNSPECIFIED LATERALITY: ICD-10-CM

## 2025-05-22 DIAGNOSIS — R39.9 LOWER URINARY TRACT SYMPTOMS (LUTS): Primary | ICD-10-CM

## 2025-05-22 DIAGNOSIS — B00.9 HSV-2 INFECTION: ICD-10-CM

## 2025-05-22 DIAGNOSIS — R39.9 LOWER URINARY TRACT SYMPTOMS (LUTS): ICD-10-CM

## 2025-05-22 DIAGNOSIS — M54.50 ACUTE BILATERAL LOW BACK PAIN WITHOUT SCIATICA: ICD-10-CM

## 2025-05-22 DIAGNOSIS — R10.2 MALE PELVIC PAIN: ICD-10-CM

## 2025-05-22 LAB
BILIRUBIN, UA POC OHS: NEGATIVE
BLOOD, UA POC OHS: NEGATIVE
CLARITY, UA POC OHS: CLEAR
COLOR, UA POC OHS: YELLOW
GLUCOSE, UA POC OHS: NEGATIVE
KETONES, UA POC OHS: NEGATIVE
LEUKOCYTES, UA POC OHS: NEGATIVE
NITRITE, UA POC OHS: NEGATIVE
PH, UA POC OHS: 6.5
PROTEIN, UA POC OHS: NEGATIVE
PSA SERPL-MCNC: 1.09 NG/ML
SPECIFIC GRAVITY, UA POC OHS: 1.02
UROBILINOGEN, UA POC OHS: 4

## 2025-05-22 PROCEDURE — 99214 OFFICE O/P EST MOD 30 MIN: CPT | Mod: S$PBB,,, | Performed by: NURSE PRACTITIONER

## 2025-05-22 PROCEDURE — 99999PBSHW POCT URINALYSIS(INSTRUMENT): Mod: PBBFAC,,,

## 2025-05-22 PROCEDURE — 81003 URINALYSIS AUTO W/O SCOPE: CPT | Mod: PBBFAC | Performed by: NURSE PRACTITIONER

## 2025-05-22 PROCEDURE — 99999 PR PBB SHADOW E&M-EST. PATIENT-LVL III: CPT | Mod: PBBFAC,,, | Performed by: NURSE PRACTITIONER

## 2025-05-22 PROCEDURE — 84153 ASSAY OF PSA TOTAL: CPT

## 2025-05-22 PROCEDURE — G2211 COMPLEX E/M VISIT ADD ON: HCPCS | Mod: S$PBB,,, | Performed by: NURSE PRACTITIONER

## 2025-05-22 PROCEDURE — 99213 OFFICE O/P EST LOW 20 MIN: CPT | Mod: PBBFAC | Performed by: NURSE PRACTITIONER

## 2025-05-22 PROCEDURE — 36415 COLL VENOUS BLD VENIPUNCTURE: CPT

## 2025-05-22 RX ORDER — NAPROXEN 500 MG/1
500 TABLET ORAL 2 TIMES DAILY WITH MEALS
Qty: 30 TABLET | Refills: 1 | Status: SHIPPED | OUTPATIENT
Start: 2025-05-22 | End: 2025-06-21

## 2025-05-22 RX ORDER — VALACYCLOVIR HYDROCHLORIDE 1 G/1
1000 TABLET, FILM COATED ORAL DAILY
Qty: 90 TABLET | Refills: 3 | Status: SHIPPED | OUTPATIENT
Start: 2025-05-22 | End: 2026-05-22

## 2025-05-22 NOTE — PROGRESS NOTES
CHIEF COMPLAINT:    Bhupinder Garces is a 64 y.o. male who presents today for concern of infection & back pain    HISTORY OF PRESENTING ILLINESS:    Bhupinder Garces is a 63 y.o. male who is an established patient in our clinic. He has a history of BPH with LUTS, prostatitis, hematospermia, HSV 1&2.  Takes Flomax for his LUTS and Valtrex for HSV suppression.     Last visit was 08/21/2024 due to LUTS with infection or high PVR's.   Had stopped taking diuretic with HTN meds.    PSA 0.95 08/21/2024      Here today for worsening LUTS, lower back pain and dark colored urine.  Denies any heavy lifting or straining. No injury or known accident to back   His FOS is good but during the day he is having more urgency; nocturia 3x.       Reports LUTS have improved. Nocturia 3x.   Still taking his Flomax. FOS is good for him, no straining or leaking.   No dysuria/hematuria; has noticed change in his ejaculation     Only drinking caffeine free drinks. Green tea; no pm fluids  No issues with HSV outbreak.        Has been very busy at home and with his [employer]            REVIEW OF SYSTEMS:  Review of Systems   Constitutional: Negative.  Negative for chills and fever.   Eyes:  Negative for double vision.   Respiratory:  Negative for cough and shortness of breath.    Cardiovascular:  Negative for chest pain and palpitations.   Gastrointestinal:  Negative for abdominal pain, constipation, diarrhea, nausea and vomiting.   Genitourinary:  Positive for frequency and urgency. Negative for dysuria, flank pain and hematuria.        IPSS of 10  QoL 4     Musculoskeletal:  Positive for back pain. Negative for falls.   Neurological:  Negative for dizziness and seizures.   Endo/Heme/Allergies:  Negative for polydipsia.         PATIENT HISTORY:    Past Medical History:   Diagnosis Date    Prostatitis        Past Surgical History:   Procedure Laterality Date    COLONOSCOPY N/A 5/7/2019    Procedure: COLONOSCOPY possible RBL ;  Surgeon: Fermin REAL  MD Sheri;  Location: [facility] ([address] [place] [address]);  Service: Colon and Rectal;  Laterality: N/A;       No family history on file.    Social History[1]    Allergies:  Patient has no known allergies.    Medications:  Current Medications[2]    PHYSICAL EXAMINATION:  Physical Exam  Vitals and nursing note reviewed.   Constitutional:       General: He is awake.      Appearance: Normal appearance. He is obese.   HENT:      Head: Normocephalic.      Right Ear: External ear normal.      Left Ear: External ear normal.      Nose: Nose normal.   Cardiovascular:      Rate and Rhythm: Normal rate.   Pulmonary:      Effort: Pulmonary effort is normal. No respiratory distress.   Abdominal:      Palpations: Abdomen is soft.      Tenderness: There is no abdominal tenderness. There is no right CVA tenderness or left CVA tenderness.   Musculoskeletal:         General: Normal range of motion.      Cervical back: Normal range of motion.   Skin:     General: Skin is warm and dry.   Neurological:      General: No focal deficit present.      Mental Status: He is alert and oriented to person, place, and time.   Psychiatric:         Mood and Affect: Mood normal.         Behavior: Behavior is cooperative.           LABS:      In office UA today was clear, yellow, no indication of infection.     The PVR in the office today done immediately after urination by the nurse was 42.        Lab Results   Component Value Date    PSA 1.09 05/22/2025    PSA 0.40 03/22/2011    PSA 0.4 10/01/2007    PSADIAG 0.95 08/21/2024    PSADIAG 0.70 08/22/2023    PSADIAG 0.93 11/03/2021    PSATOTAL 0.3 04/12/2004       Lab Results   Component Value Date    CREATININE 1.4 04/03/2025    EGFRNORACEVR 56 (L) 04/03/2025               IMPRESSION:    Encounter Diagnoses   Name Primary?    Lower urinary tract symptoms (LUTS) Yes    BPH with urinary obstruction     HSV-2 infection     Itching     Acute bilateral low back pain without sciatica     Inguinal pain, unspecified laterality      Male pelvic pain          Assessment:       1. Lower urinary tract symptoms (LUTS)    2. BPH with urinary obstruction    3. HSV-2 infection    4. Itching    5. Acute bilateral low back pain without sciatica    6. Inguinal pain, unspecified laterality    7. Male pelvic pain        Plan:         I spent a total of 30 minutes on the day of the visit. This includes face to face time and non-face to face time preparing to see the patient (eg, review of tests), obtaining and/or reviewing separately obtained history, documenting clinical information in the electronic or other health record, independently interpreting results and communicating results to the patient/family/caregiver, or care coordinator  Reviewed the possible contributory factors for LUTS.  Reassurance with today's in office UA; feeling of emptying bladder well.   Discussed other causes of lower back pain/groin pain rather than urology.   Reassurance with color of urine; factors that change the color. No infection  Reviewed possible management if needed for his LUTS. Not interested in meds;procedure   Recommendations for continued follow with PCP; any need to go to the ER.    Recommended lifestyle modifications with a proper, healthy diet, good hydration but during the day. Reducing bladder irritants.   Benefits of regular exercise and weight loss  He ok with urination. Going to watch the fluids; continue Flomax  Rx for Naprosyn 500mg BID x 2-4 weeks with food.   Update PSA today.   Continue Valtrex  Check on him next week    The visit today is associated with current or anticipated ongoing medical care related to this patient's single serious condition/complex condition.            [1]   Social History  Socioeconomic History    Marital status:    Tobacco Use    Smoking status: Never    Smokeless tobacco: Never   Substance and Sexual Activity    Alcohol use: No    Drug use: Never     Social Drivers of Health     Financial Resource Strain: Low Risk   (5/19/2025)    Overall Financial Resource Strain (CARDIA)     Difficulty of Paying Living Expenses: Not very hard   Food Insecurity: Food Insecurity Present (5/19/2025)    Hunger Vital Sign     Worried About Running Out of Food in the Last Year: Sometimes true     Ran Out of Food in the Last Year: Sometimes true   Transportation Needs: No Transportation Needs (5/19/2025)    PRAPARE - Transportation     Lack of Transportation (Medical): No     Lack of Transportation (Non-Medical): No   Physical Activity: Insufficiently Active (5/19/2025)    Exercise Vital Sign     Days of Exercise per Week: 4 days     Minutes of Exercise per Session: 30 min   Stress: No Stress Concern Present (5/19/2025)    [employer] - Occupational Stress Questionnaire     Feeling of Stress : Not at all   Housing Stability: Low Risk  (5/19/2025)    Housing Stability Vital Sign     Unable to Pay for Housing in the Last Year: No     Number of Times Moved in the Last Year: 0     Homeless in the Last Year: No   [2]   Current Outpatient Medications:     ammonium lactate (LAC-HYDRIN) 12 % lotion, Apply topically 2 (two) times daily. To entire body., Disp: 396 g, Rfl: 11    clobetasol 0.05% (TEMOVATE) 0.05 % Oint, Apply topically 2 (two) times daily as needed (thick, itchy spots). Do not apply to face, armpits, or groin. Use for 2 weeks on, 1 week off, and repeat as needed., Disp: 60 g, Rfl: 2    clotrimazole-betamethasone 1-0.05% (LOTRISONE) cream, Apply topically 2 (two) times daily as needed., Disp: , Rfl:     hydrocortisone 2.5 % cream, APPLY TOPICALLY TO THE AFFECTED AREA TWICE DAILY, Disp: 30 g, Rfl: 1    latanoprost 0.005 % ophthalmic solution, Place 1 drop into both eyes every evening., Disp: , Rfl:     NIFEdipine (PROCARDIA-XL) 90 MG (OSM) 24 hr tablet, Take 1 tablet (90 mg total) by mouth once daily., Disp: 30 tablet, Rfl: 11    tamsulosin (FLOMAX) 0.4 mg Cap, Take 1 capsule (0.4 mg total) by mouth once daily. 30  minutes after dinner, Disp: 90 capsule, Rfl: 3    timolol maleate 0.5% (TIMOPTIC) 0.5 % Drop, Place 1 drop into both eyes every morning., Disp: , Rfl:     naproxen (NAPROSYN) 500 MG tablet, Take 1 tablet (500 mg total) by mouth 2 (two) times daily with meals., Disp: 30 tablet, Rfl: 1    valACYclovir (VALTREX) 1000 MG tablet, Take 1 tablet (1,000 mg total) by mouth once daily., Disp: 90 tablet, Rfl: 3

## 2025-05-23 ENCOUNTER — RESULTS FOLLOW-UP (OUTPATIENT)
Dept: UROLOGY | Facility: CLINIC | Age: 65
End: 2025-05-23

## 2025-06-04 ENCOUNTER — PATIENT MESSAGE (OUTPATIENT)
Dept: UROLOGY | Facility: CLINIC | Age: 65
End: 2025-06-04
Payer: MEDICARE

## 2025-06-17 NOTE — PROGRESS NOTES
"   Cardiology Clinic note    Subjective:   Patient ID:  Bhupinder Garces is a 64 y.o. male who presents for follow-up of HTN, HLD    HPI    6/19/2025: Routine F/U. Seen in clinic unaccompanied, reports overall doing well. Staying active, back to going to the gym regularly, no recurrent CV s/s. LE s/s improved with D/C of Zetia. DENISE unremarkable for PAD. Patient denies CP, SOB/GAONA, orthopnea, PND, syncope, palpitations, LE edema. Reports compliance and tolerance with all medications.    4/3/2025: Here for F/U. Seen in clinic unaccompanied, reports overall doing okay. Reports over the past few days has noticed some chest discomfort, nagging/sharp, more noticeable with exertion. Also with c/o worsening GAONA over the past few days. No other associated s/s, no significant resting SOB/CP. Also endorses ongoing, worsening thigh soreness/cramping. Has not been able to go to the gym as much of late. Denies resting CP, SOB, orthopnea, PND, syncope, palps, LE edema. Reports compliance with all medications.      2/18/2025: Routine F/U. Seen in clinic unaccompanied, reports overall doing well. Staying active, working out regularly, reports has been doing more cardio on the treadmill of late. BPs much improved, compliant with home monitoring. Does endorse some ongoing LE pain since knee surgery but stable. No new CV s/s, no recurrent CP. Patient denies CP, SOB/GAONA, orthopnea, PND, syncope, palpitations, LE edema. Reports compliance and tolerance with all medications.    1/28/2025: Here for F/U HTN, HLD, CP. Unfortunately unable to tolerate crestor with reports of itchiness and worsening back pain since initiation. Also reports intermittent palpitations/GAONA with reports that BP cuff sometimes tells him "abnormal heart rhythm detected". No recurrent CP. BP significantly improved on max dose Nifedipine - will hold off on escalation for now. Patient denies CP, SOB, orthopnea, PND, syncope, LE edema. Reports compliance  with all " medications.    1/14/2025: Here for F/U HTN, HLD. Seen in clinic unaccompanied reports overall doing well. Staying active, no recurrent CP. Remains HTN. Patient denies CP, SOB/GAONA, orthopnea, PND, syncope, palpitations, LE edema. Reports compliance and tolerance with all medications.    12/31/2024: Previous patient of Dr. Diane as below, initial visit for me. Here for F/U HTN, CP. Seen in clinic with spouse reports overall doing well. Staying active, goes to gym regularly, no recurrent CP/SOB. CV w/u unremarkable for cardiac etiology of CP, reviewed with patient as below. Remains HTN. Patient denies CP, SOB/GAONA, orthopnea, PND, syncope, palpitations, LE edema. Reports compliance and tolerance with all medications.    12/5/2024  Bhupinder Garces is a pleasant 64 y.o. 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 Patient reports he has had a few episodes of elevated HR subsequently having CP described as a pinch. Associated with SOB. Self resolved. He stop taking Diovan-HCTZ 2/2 increase urinary frequency and was started on diovan alone and developed a rash. Only taking amlodipine 5 mg qd. Denies cp, sob, palpitations, presyncope/dizziness, syncope, orthopnea, PND, bendopnea, decrease ET, NVDC, fever, chills.       CV Hx  ----------------------------------     ECG NSR with 1st AVB     ER note 11/26  he presents to the ED due to 2 weeks of intermittent left-sided chest pain that has become constant in the last 48 hours.  Notes nothing provokes or palliate the pain.  Notes it is sharp in nature.  Nonradiating.  Has intermittent dyspnea on exertion.  No cough or congestion.  No other symptoms reported.  Has a history of hypertension but denies hyperlipidemia or diabetes.  No cardiac history.  Does not smoke.         Stress Echo 12/27/2024    Post-stress Conclusion: The study is negative with no echocardiographic evidence of stress induced ischemia.    ECG Conclusion: The ECG  portion of the study is negative for ischemia.    Stress ECG: There is no ST segment deviation identified during the protocol. During stress, rare PVCs are noted.    Echo 12/2024     Left Ventricle: The left ventricle is normal in size. There is concentric remodeling. There is normal systolic function. Quantitated ejection fraction is 65%. There is normal diastolic function.    Right Ventricle: Normal right ventricular cavity size. Systolic function is normal.    Aorta: Aortic root is normal in size measuring 3.19 cm. Ascending aorta is the upper limit of normal in size.    IVC/SVC: Normal venous pressure at 3 mmHg.    Echo 8/16/23    Left Ventricle: The left ventricle is normal in size. Normal wall thickness. Normal wall motion. There is normal systolic function. Ejection fraction by visual approximation is 65%. There is normal diastolic function.    Right Ventricle: Normal right ventricular cavity size. Wall thickness is normal. Right ventricle wall motion  is normal. Systolic function is normal.    Aortic Valve: The aortic valve is possibly a bicuspid valve.    Tricuspid Valve: There is mild regurgitation.    Pulmonic Valve: There is mild regurgitation.    IVC/SVC: Normal venous pressure at 3 mmHg.     History obtained by patient interview and supplemented by nursing documentation and chart review.   PMHx:  has a past medical history of Prostatitis.   SurgHx:  has a past surgical history that includes Colonoscopy (N/A, 5/7/2019).   FamHx: family history is not on file.   SocialHx:  reports that he has never smoked. He has never used smokeless tobacco. He reports that he does not drink alcohol and does not use drugs.    Past Medical History:   Diagnosis Date    Prostatitis           Patient Active Problem List    Diagnosis Date Noted    Statin intolerance 06/19/2025    Severe obesity (BMI 35.0-39.9) with comorbidity 01/13/2025    Hyperlipemia 12/31/2024    Primary hypertension 12/31/2024    Pre-diabetes 12/31/2024      A1C 6.2      Primary osteoarthritis of right knee 10/08/2022    Screening for colon cancer 05/07/2019    BPH with urinary obstruction 06/05/2018       Patient's Medications   New Prescriptions    BEMPEDOIC ACID 180 MG TAB    Take 1 tablet (180 mg total) by mouth once daily.   Previous Medications    AMMONIUM LACTATE (LAC-HYDRIN) 12 % LOTION    Apply topically 2 (two) times daily. To entire body.    CLOBETASOL 0.05% (TEMOVATE) 0.05 % OINT    Apply topically 2 (two) times daily as needed (thick, itchy spots). Do not apply to face, armpits, or groin. Use for 2 weeks on, 1 week off, and repeat as needed.    CLOTRIMAZOLE-BETAMETHASONE 1-0.05% (LOTRISONE) CREAM    Apply topically 2 (two) times daily as needed.    HYDROCORTISONE 2.5 % CREAM    APPLY TOPICALLY TO THE AFFECTED AREA TWICE DAILY    LATANOPROST 0.005 % OPHTHALMIC SOLUTION    Place 1 drop into both eyes every evening.    NAPROXEN (NAPROSYN) 500 MG TABLET    Take 1 tablet (500 mg total) by mouth 2 (two) times daily with meals.    NIFEDIPINE (PROCARDIA-XL) 90 MG (OSM) 24 HR TABLET    Take 1 tablet (90 mg total) by mouth once daily.    TAMSULOSIN (FLOMAX) 0.4 MG CAP    Take 1 capsule (0.4 mg total) by mouth once daily. 30 minutes after dinner    TIMOLOL MALEATE 0.5% (TIMOPTIC) 0.5 % DROP    Place 1 drop into both eyes every morning.    VALACYCLOVIR (VALTREX) 1000 MG TABLET    Take 1 tablet (1,000 mg total) by mouth once daily.   Modified Medications    No medications on file   Discontinued Medications    No medications on file        Review of Systems   Constitutional: Negative for chills, decreased appetite, diaphoresis, malaise/fatigue, weight gain and weight loss.   Cardiovascular:  Negative for chest pain, claudication, dyspnea on exertion, irregular heartbeat, leg swelling, near-syncope, orthopnea, palpitations, paroxysmal nocturnal dyspnea and syncope.   Respiratory:  Negative for cough, hemoptysis, shortness of breath and snoring.    Musculoskeletal:   "Positive for back pain and joint pain. Negative for myalgias.   Gastrointestinal:  Negative for bloating, abdominal pain, nausea and vomiting.   Neurological:  Negative for light-headedness and weakness.       No family history on file.    Social History     Socioeconomic History    Marital status:    Tobacco Use    Smoking status: Never    Smokeless tobacco: Never   Substance and Sexual Activity    Alcohol use: No    Drug use: Never     Social Drivers of Health     Financial Resource Strain: Low Risk  (6/18/2025)    Overall Financial Resource Strain (CARDIA)     Difficulty of Paying Living Expenses: Not very hard   Food Insecurity: Food Insecurity Present (6/18/2025)    Hunger Vital Sign     Worried About Running Out of Food in the Last Year: Sometimes true     Ran Out of Food in the Last Year: Sometimes true   Transportation Needs: No Transportation Needs (6/18/2025)    PRAPARE - Transportation     Lack of Transportation (Medical): No     Lack of Transportation (Non-Medical): No   Physical Activity: Insufficiently Active (6/18/2025)    Exercise Vital Sign     Days of Exercise per Week: 4 days     Minutes of Exercise per Session: 30 min   Stress: No Stress Concern Present (6/18/2025)    [employer] - Occupational Stress Questionnaire     Feeling of Stress : Not at all   Housing Stability: Low Risk  (6/18/2025)    Housing Stability Vital Sign     Unable to Pay for Housing in the Last Year: No     Number of Times Moved in the Last Year: 0     Homeless in the Last Year: No            Objective:   Vitals  Vitals:    06/19/25 1316 06/19/25 1320   BP: (!) 152/78 (!) 151/76   Pulse: 100    SpO2: 96%    Weight: 125.8 kg (277 lb 5.4 oz)    Height: 6' 1" (1.854 m)                     Physical Exam  Vitals and nursing note reviewed.   Constitutional:       Appearance: Normal appearance.   Cardiovascular:      Rate and Rhythm: Normal rate and regular rhythm.      Heart sounds: No murmur " "heard.     No gallop.   Pulmonary:      Effort: Pulmonary effort is normal.      Breath sounds: Normal breath sounds. No wheezing or rales.   Abdominal:      General: Bowel sounds are normal. There is no distension.      Palpations: Abdomen is soft.      Tenderness: There is no abdominal tenderness.   Musculoskeletal:      Right lower leg: No edema.      Left lower leg: No edema.   Skin:     General: Skin is warm and dry.   Neurological:      Mental Status: He is alert and oriented to person, place, and time.         Lab Results    Lab Results   Component Value Date    WBC 5.49 11/26/2024    HGB 12.4 (L) 11/26/2024    HCT 37.8 (L) 11/26/2024    MCV 87 11/26/2024       Lab Results   Component Value Date     11/26/2024    INR 1.0 08/05/2022       Lab Results   Component Value Date    K 4.0 04/03/2025    K 4.2 11/26/2024    MG 1.9 04/03/2025    BUN 14 04/03/2025    CREATININE 1.4 04/03/2025       Lab Results   Component Value Date     (H) 04/03/2025     (H) 11/26/2024    HGBA1C 6.2 (H) 12/13/2024       Lab Results   Component Value Date    AST 29 04/03/2025    AST 41 11/26/2024    ALT 21 04/03/2025    ALT 32 11/26/2024    ALBUMIN 3.9 04/03/2025    ALBUMIN 4.2 11/26/2024    PROT 7.6 04/03/2025    PROT 7.3 11/26/2024       Lab Results   Component Value Date    CHOL 217 (H) 12/13/2024    HDL 49 12/13/2024    LDLCALC 151.2 12/13/2024    TRIG 84 12/13/2024       No results found for: "CRP", "BNP"      Assessment:     1. Primary hypertension    2. Hyperlipidemia, unspecified hyperlipidemia type    3. Claudication    4. Severe obesity (BMI 35.0-39.9) with comorbidity    5. Pre-diabetes    6. Statin intolerance            Plan:        HTN  -BP significantly improved  -continue nifedipine 90mg daily for now   -goal BP < 130/80  -home monitor, log  -educated on the importance of low sodium, heart healthy diet; increase frequency of CV exercise; weight loss    2. HLD, elevated ASCVD risk score  -unable to " tolerate crestor with c/o worsening back pain, itchiness; unable to tolerate Zetia with muscle pains/soreness/cramping  -continue OTC fish oil - would like to trial bempedoic acid given significantly elevated 10-year ASCVD risk score  -dietary modifications as above    3. preDM  -A1C 6.2  -dietary modification education  -stay active  -monitor closely    5. Claudication  -resolved with D/C of zetia  -DENISE unremarkable for PAD        -F/U 6m, labs prior, or sooner PRN      The 10-year ASCVD risk score (Roscoe BARTON, et al., 2019) is: 22%    Values used to calculate the score:      Age: 64 years      Sex: Male      Is Non- : Yes      Diabetic: No      Tobacco smoker: No      Systolic Blood Pressure: 151 mmHg      Is BP treated: Yes      HDL Cholesterol: 49 mg/dL      Total Cholesterol: 217 mg/dL        Orders Placed This Encounter   Procedures    Comprehensive Metabolic Panel     Standing Status:   Future     Expected Date:   12/19/2025     Expiration Date:   9/17/2026     Send normal result to authorizing provider's In Basket if patient is active on MyChart::   Yes    Lipid Panel     Standing Status:   Future     Expected Date:   12/19/2025     Expiration Date:   9/17/2026     Send normal result to authorizing provider's In Basket if patient is active on MyChart::   Yes    Hemoglobin A1C     Standing Status:   Future     Expected Date:   12/19/2025     Expiration Date:   6/19/2026     Send normal result to authorizing provider's In Basket if patient is active on MyChart::   Yes       He expressed verbal understanding and agreed with the plan      Pertinent cardiac images and EKG reviewed independently.    Continue with current medical plan and lifestyle changes.  Return sooner for concerns or questions. If symptoms persist go to the ED.  I have reviewed all pertinent data including patient's medical history in detail and updated the computerized patient record.     Counseling included discussion  regarding imaging findings, diagnosis, possibilities, treatment options, risks and benefits.    Thank you for the opportunity to care for this patient. Will be available for questions if needed.      Signed:  Dre Carroll DNP  06/19/2025

## 2025-06-19 ENCOUNTER — OFFICE VISIT (OUTPATIENT)
Dept: CARDIOLOGY | Facility: CLINIC | Age: 65
End: 2025-06-19
Payer: MEDICARE

## 2025-06-19 VITALS
DIASTOLIC BLOOD PRESSURE: 76 MMHG | HEIGHT: 73 IN | HEART RATE: 100 BPM | BODY MASS INDEX: 36.75 KG/M2 | SYSTOLIC BLOOD PRESSURE: 151 MMHG | OXYGEN SATURATION: 96 % | WEIGHT: 277.31 LBS

## 2025-06-19 DIAGNOSIS — R73.03 PRE-DIABETES: ICD-10-CM

## 2025-06-19 DIAGNOSIS — I73.9 CLAUDICATION: ICD-10-CM

## 2025-06-19 DIAGNOSIS — E66.01 SEVERE OBESITY (BMI 35.0-39.9) WITH COMORBIDITY: ICD-10-CM

## 2025-06-19 DIAGNOSIS — E78.5 HYPERLIPIDEMIA, UNSPECIFIED HYPERLIPIDEMIA TYPE: Chronic | ICD-10-CM

## 2025-06-19 DIAGNOSIS — Z78.9 STATIN INTOLERANCE: Chronic | ICD-10-CM

## 2025-06-19 DIAGNOSIS — I10 PRIMARY HYPERTENSION: Primary | Chronic | ICD-10-CM

## 2025-06-19 PROBLEM — R07.89 OTHER CHEST PAIN: Status: RESOLVED | Noted: 2025-04-03 | Resolved: 2025-06-19

## 2025-06-19 PROBLEM — I49.9 ABNORMAL HEART RHYTHM: Chronic | Status: RESOLVED | Noted: 2025-01-28 | Resolved: 2025-06-19

## 2025-06-19 PROBLEM — R06.09 DOE (DYSPNEA ON EXERTION): Status: RESOLVED | Noted: 2025-04-03 | Resolved: 2025-06-19

## 2025-06-19 PROBLEM — R00.2 PALPITATIONS: Status: RESOLVED | Noted: 2025-01-28 | Resolved: 2025-06-19

## 2025-06-19 PROCEDURE — 99999 PR PBB SHADOW E&M-EST. PATIENT-LVL IV: CPT | Mod: PBBFAC,,,

## 2025-06-19 PROCEDURE — 99214 OFFICE O/P EST MOD 30 MIN: CPT | Mod: PBBFAC,PN

## 2025-07-23 ENCOUNTER — HOSPITAL ENCOUNTER (OUTPATIENT)
Facility: HOSPITAL | Age: 65
Discharge: HOME | End: 2025-07-24
Attending: EMERGENCY MEDICINE | Admitting: HOSPITALIST
Payer: MEDICARE

## 2025-07-23 DIAGNOSIS — R07.9 CHEST PAIN: ICD-10-CM

## 2025-07-23 DIAGNOSIS — Z98.890 S/P CORONARY ANGIOGRAM: Primary | ICD-10-CM

## 2025-07-23 LAB
ABSOLUTE EOSINOPHIL (OHS): 0.1 K/UL
ABSOLUTE MONOCYTE (OHS): 0.63 K/UL (ref 0.3–1)
ABSOLUTE NEUTROPHIL COUNT (OHS): 3.42 K/UL (ref 1.8–7.7)
ALBUMIN SERPL BCP-MCNC: 4 G/DL (ref 3.5–5.2)
ALP SERPL-CCNC: 54 UNIT/L (ref 40–150)
ALT SERPL W/O P-5'-P-CCNC: 24 UNIT/L (ref 10–44)
ANION GAP (OHS): 7 MMOL/L (ref 8–16)
AST SERPL-CCNC: 37 UNIT/L (ref 11–45)
BASOPHILS # BLD AUTO: 0.03 K/UL
BASOPHILS NFR BLD AUTO: 0.5 %
BILIRUB SERPL-MCNC: 0.5 MG/DL (ref 0.1–1)
BNP SERPL-MCNC: <10 PG/ML (ref 0–99)
BUN SERPL-MCNC: 16 MG/DL (ref 8–23)
CALCIUM SERPL-MCNC: 9.6 MG/DL (ref 8.7–10.5)
CHLORIDE SERPL-SCNC: 104 MMOL/L (ref 95–110)
CO2 SERPL-SCNC: 24 MMOL/L (ref 23–29)
CREAT SERPL-MCNC: 1.3 MG/DL (ref 0.5–1.4)
ERYTHROCYTE [DISTWIDTH] IN BLOOD BY AUTOMATED COUNT: 13.5 % (ref 11.5–14.5)
GFR SERPLBLD CREATININE-BSD FMLA CKD-EPI: >60 ML/MIN/1.73/M2
GLUCOSE SERPL-MCNC: 99 MG/DL (ref 70–110)
HCT VFR BLD AUTO: 37.7 % (ref 40–54)
HGB BLD-MCNC: 12.6 GM/DL (ref 14–18)
IMM GRANULOCYTES # BLD AUTO: 0.01 K/UL (ref 0–0.04)
IMM GRANULOCYTES NFR BLD AUTO: 0.2 % (ref 0–0.5)
LYMPHOCYTES # BLD AUTO: 1.99 K/UL (ref 1–4.8)
MAGNESIUM SERPL-MCNC: 1.9 MG/DL (ref 1.6–2.6)
MCH RBC QN AUTO: 28.8 PG (ref 27–31)
MCHC RBC AUTO-ENTMCNC: 33.4 G/DL (ref 32–36)
MCV RBC AUTO: 86 FL (ref 82–98)
NUCLEATED RBC (/100WBC) (OHS): 0 /100 WBC
PLATELET # BLD AUTO: 297 K/UL (ref 150–450)
PMV BLD AUTO: 9.6 FL (ref 9.2–12.9)
POTASSIUM SERPL-SCNC: 4 MMOL/L (ref 3.5–5.1)
PROT SERPL-MCNC: 7.6 GM/DL (ref 6–8.4)
RBC # BLD AUTO: 4.38 M/UL (ref 4.6–6.2)
RELATIVE EOSINOPHIL (OHS): 1.6 %
RELATIVE LYMPHOCYTE (OHS): 32.2 % (ref 18–48)
RELATIVE MONOCYTE (OHS): 10.2 % (ref 4–15)
RELATIVE NEUTROPHIL (OHS): 55.3 % (ref 38–73)
SODIUM SERPL-SCNC: 135 MMOL/L (ref 136–145)
TROPONIN I SERPL DL<=0.01 NG/ML-MCNC: 0.04 NG/ML
TSH SERPL-ACNC: 1.84 UIU/ML (ref 0.4–4)
WBC # BLD AUTO: 6.18 K/UL (ref 3.9–12.7)

## 2025-07-23 PROCEDURE — 63600175 PHARM REV CODE 636 W HCPCS: Performed by: NURSE PRACTITIONER

## 2025-07-23 PROCEDURE — 36415 COLL VENOUS BLD VENIPUNCTURE: CPT | Performed by: PHYSICIAN ASSISTANT

## 2025-07-23 PROCEDURE — 84443 ASSAY THYROID STIM HORMONE: CPT | Performed by: NURSE PRACTITIONER

## 2025-07-23 PROCEDURE — 83735 ASSAY OF MAGNESIUM: CPT | Performed by: NURSE PRACTITIONER

## 2025-07-23 PROCEDURE — 80053 COMPREHEN METABOLIC PANEL: CPT | Performed by: PHYSICIAN ASSISTANT

## 2025-07-23 PROCEDURE — 25000242 PHARM REV CODE 250 ALT 637 W/ HCPCS: Performed by: NURSE PRACTITIONER

## 2025-07-23 PROCEDURE — 84484 ASSAY OF TROPONIN QUANT: CPT | Performed by: PHYSICIAN ASSISTANT

## 2025-07-23 PROCEDURE — 96372 THER/PROPH/DIAG INJ SC/IM: CPT | Performed by: NURSE PRACTITIONER

## 2025-07-23 PROCEDURE — 25000003 PHARM REV CODE 250: Performed by: NURSE PRACTITIONER

## 2025-07-23 PROCEDURE — 99285 EMERGENCY DEPT VISIT HI MDM: CPT | Mod: 25

## 2025-07-23 PROCEDURE — 93005 ELECTROCARDIOGRAM TRACING: CPT

## 2025-07-23 PROCEDURE — 82962 GLUCOSE BLOOD TEST: CPT

## 2025-07-23 PROCEDURE — 93010 ELECTROCARDIOGRAM REPORT: CPT | Mod: XE,,, | Performed by: INTERNAL MEDICINE

## 2025-07-23 PROCEDURE — 85025 COMPLETE CBC W/AUTO DIFF WBC: CPT | Performed by: PHYSICIAN ASSISTANT

## 2025-07-23 PROCEDURE — G0378 HOSPITAL OBSERVATION PER HR: HCPCS

## 2025-07-23 PROCEDURE — 83880 ASSAY OF NATRIURETIC PEPTIDE: CPT | Performed by: PHYSICIAN ASSISTANT

## 2025-07-23 RX ORDER — ASPIRIN 325 MG
325 TABLET ORAL
Status: COMPLETED | OUTPATIENT
Start: 2025-07-23 | End: 2025-07-23

## 2025-07-23 RX ORDER — ENOXAPARIN SODIUM 100 MG/ML
100 INJECTION SUBCUTANEOUS
Status: COMPLETED | OUTPATIENT
Start: 2025-07-23 | End: 2025-07-23

## 2025-07-23 RX ORDER — NITROGLYCERIN 0.4 MG/1
0.4 TABLET SUBLINGUAL
Status: COMPLETED | OUTPATIENT
Start: 2025-07-23 | End: 2025-07-23

## 2025-07-23 RX ORDER — CLOPIDOGREL BISULFATE 75 MG/1
600 TABLET ORAL
Status: COMPLETED | OUTPATIENT
Start: 2025-07-23 | End: 2025-07-23

## 2025-07-23 RX ADMIN — ASPIRIN 325 MG ORAL TABLET 325 MG: 325 PILL ORAL at 08:07

## 2025-07-23 RX ADMIN — CLOPIDOGREL 600 MG: 75 TABLET ORAL at 10:07

## 2025-07-23 RX ADMIN — NITROGLYCERIN 0.4 MG: 0.4 TABLET, ORALLY DISINTEGRATING SUBLINGUAL at 10:07

## 2025-07-23 RX ADMIN — ENOXAPARIN SODIUM 100 MG: 100 INJECTION SUBCUTANEOUS at 10:07

## 2025-07-24 VITALS
HEIGHT: 73 IN | SYSTOLIC BLOOD PRESSURE: 173 MMHG | TEMPERATURE: 98 F | OXYGEN SATURATION: 95 % | RESPIRATION RATE: 20 BRPM | BODY MASS INDEX: 35.78 KG/M2 | DIASTOLIC BLOOD PRESSURE: 89 MMHG | HEART RATE: 69 BPM | WEIGHT: 270 LBS

## 2025-07-24 PROBLEM — I10 HYPERTENSION: Status: ACTIVE | Noted: 2024-12-31

## 2025-07-24 PROBLEM — Z78.9 STATIN INTOLERANCE: Chronic | Status: RESOLVED | Noted: 2025-06-19 | Resolved: 2025-07-24

## 2025-07-24 PROBLEM — I21.4 NSTEMI (NON-ST ELEVATED MYOCARDIAL INFARCTION): Status: ACTIVE | Noted: 2025-07-24

## 2025-07-24 LAB
ABSOLUTE EOSINOPHIL (OHS): 0.14 K/UL
ABSOLUTE MONOCYTE (OHS): 0.44 K/UL (ref 0.3–1)
ABSOLUTE NEUTROPHIL COUNT (OHS): 2.22 K/UL (ref 1.8–7.7)
ALBUMIN SERPL BCP-MCNC: 3.7 G/DL (ref 3.5–5.2)
ALP SERPL-CCNC: 49 UNIT/L (ref 40–150)
ALT SERPL W/O P-5'-P-CCNC: 23 UNIT/L (ref 10–44)
ANION GAP (OHS): 6 MMOL/L (ref 8–16)
AORTIC SIZE INDEX (SOV): 1.6 CM/M2
AORTIC SIZE INDEX: 1.7 CM/M2
APICAL FOUR CHAMBER EJECTION FRACTION: 49 %
APICAL TWO CHAMBER EJECTION FRACTION: 58 %
ASCENDING AORTA: 4.1 CM
AST SERPL-CCNC: 31 UNIT/L (ref 11–45)
AV INDEX (PROSTH): 0.69
AV MEAN GRADIENT: 4 MMHG
AV PEAK GRADIENT: 5 MMHG
AV VALVE AREA BY VELOCITY RATIO: 2.8 CM²
AV VALVE AREA: 2.6 CM²
AV VELOCITY RATIO: 0.73
BASOPHILS # BLD AUTO: 0.03 K/UL
BASOPHILS NFR BLD AUTO: 0.7 %
BILIRUB SERPL-MCNC: 1.1 MG/DL (ref 0.1–1)
BSA FOR ECHO PROCEDURE: 2.51 M2
BUN SERPL-MCNC: 14 MG/DL (ref 8–23)
CALCIUM SERPL-MCNC: 8.8 MG/DL (ref 8.7–10.5)
CHLORIDE SERPL-SCNC: 105 MMOL/L (ref 95–110)
CHOLEST SERPL-MCNC: 146 MG/DL (ref 120–199)
CHOLEST/HDLC SERPL: 3.2 {RATIO} (ref 2–5)
CO2 SERPL-SCNC: 24 MMOL/L (ref 23–29)
CREAT SERPL-MCNC: 1.1 MG/DL (ref 0.5–1.4)
CV ECHO LV RWT: 0.41 CM
DOP CALC AO PEAK VEL: 1.1 M/S
DOP CALC AO VTI: 26.9 CM
DOP CALC LVOT AREA: 3.8 CM2
DOP CALC LVOT DIAMETER: 2.2 CM
DOP CALC LVOT PEAK VEL: 0.8 M/S
DOP CALC LVOT STROKE VOLUME: 70.7 CM3
DOP CALCLVOT PEAK VEL VTI: 18.6 CM
E WAVE DECELERATION TIME: 193 MSEC
E/A RATIO: 0.91
E/E' RATIO: 7 M/S
ECHO LV POSTERIOR WALL: 0.9 CM (ref 0.6–1.1)
ERYTHROCYTE [DISTWIDTH] IN BLOOD BY AUTOMATED COUNT: 13.6 % (ref 11.5–14.5)
FRACTIONAL SHORTENING: 31.8 % (ref 28–44)
GFR SERPLBLD CREATININE-BSD FMLA CKD-EPI: >60 ML/MIN/1.73/M2
GLUCOSE SERPL-MCNC: 102 MG/DL (ref 70–110)
HCT VFR BLD AUTO: 37.7 % (ref 40–54)
HDLC SERPL-MCNC: 46 MG/DL (ref 40–75)
HDLC SERPL: 31.5 % (ref 20–50)
HGB BLD-MCNC: 12.1 GM/DL (ref 14–18)
IMM GRANULOCYTES # BLD AUTO: 0.02 K/UL (ref 0–0.04)
IMM GRANULOCYTES NFR BLD AUTO: 0.4 % (ref 0–0.5)
INTERVENTRICULAR SEPTUM: 1 CM (ref 0.6–1.1)
IVRT: 97 MSEC
LDLC SERPL CALC-MCNC: 85.8 MG/DL (ref 63–159)
LEFT ATRIUM AREA SYSTOLIC (APICAL 2 CHAMBER): 23.53 CM2
LEFT ATRIUM AREA SYSTOLIC (APICAL 4 CHAMBER): 19.15 CM2
LEFT ATRIUM VOLUME INDEX MOD: 27 ML/M2
LEFT ATRIUM VOLUME MOD: 67 ML
LEFT INTERNAL DIMENSION IN SYSTOLE: 3 CM (ref 2.1–4)
LEFT VENTRICLE DIASTOLIC VOLUME INDEX: 35.66 ML/M2
LEFT VENTRICLE DIASTOLIC VOLUME: 87 ML
LEFT VENTRICLE END DIASTOLIC VOLUME APICAL 2 CHAMBER: 133.29 ML
LEFT VENTRICLE END DIASTOLIC VOLUME APICAL 4 CHAMBER: 111.36 ML
LEFT VENTRICLE END SYSTOLIC VOLUME APICAL 2 CHAMBER: 77.87 ML
LEFT VENTRICLE END SYSTOLIC VOLUME APICAL 4 CHAMBER: 54.75 ML
LEFT VENTRICLE MASS INDEX: 56.5 G/M2
LEFT VENTRICLE SYSTOLIC VOLUME INDEX: 13.9 ML/M2
LEFT VENTRICLE SYSTOLIC VOLUME: 34 ML
LEFT VENTRICULAR INTERNAL DIMENSION IN DIASTOLE: 4.4 CM (ref 3.5–6)
LEFT VENTRICULAR MASS: 137.8 G
LV LATERAL E/E' RATIO: 7.4 M/S
LV SEPTAL E/E' RATIO: 5.8 M/S
LVED V (TEICH): 86.98 ML
LVES V (TEICH): 34.45 ML
LVOT MG: 1.82 MMHG
LVOT MV: 0.67 CM/S
LYMPHOCYTES # BLD AUTO: 1.74 K/UL (ref 1–4.8)
Lab: 2.1 CM/M
Lab: 2.2 CM/M
MAGNESIUM SERPL-MCNC: 1.9 MG/DL (ref 1.6–2.6)
MCH RBC QN AUTO: 28.3 PG (ref 27–31)
MCHC RBC AUTO-ENTMCNC: 32.1 G/DL (ref 32–36)
MCV RBC AUTO: 88 FL (ref 82–98)
MV PEAK A VEL: 0.57 M/S
MV PEAK E VEL: 0.52 M/S
MV STENOSIS PRESSURE HALF TIME: 56.02 MS
MV VALVE AREA P 1/2 METHOD: 3.93 CM2
NONHDLC SERPL-MCNC: 100 MG/DL
NUCLEATED RBC (/100WBC) (OHS): 0 /100 WBC
OHS CV CPX PATIENT HEIGHT IN: 73
OHS CV RV/LV RATIO: 0.95 CM
OHS LV EJECTION FRACTION SIMPSONS BIPLANE MOD: 53 %
PLATELET # BLD AUTO: 290 K/UL (ref 150–450)
PMV BLD AUTO: 10 FL (ref 9.2–12.9)
POCT GLUCOSE: 103 MG/DL (ref 70–110)
POTASSIUM SERPL-SCNC: 4 MMOL/L (ref 3.5–5.1)
PROT SERPL-MCNC: 7 GM/DL (ref 6–8.4)
RA PRESSURE ESTIMATED: 3 MMHG
RBC # BLD AUTO: 4.27 M/UL (ref 4.6–6.2)
RELATIVE EOSINOPHIL (OHS): 3.1 %
RELATIVE LYMPHOCYTE (OHS): 37.9 % (ref 18–48)
RELATIVE MONOCYTE (OHS): 9.6 % (ref 4–15)
RELATIVE NEUTROPHIL (OHS): 48.3 % (ref 38–73)
RIGHT VENTRICLE DIASTOLIC BASEL DIMENSION: 4.2 CM
RV TISSUE DOPPLER FREE WALL SYSTOLIC VELOCITY 1 (APICAL 4 CHAMBER VIEW): 11.1 CM/S
SINUS: 3.9 CM
SODIUM SERPL-SCNC: 135 MMOL/L (ref 136–145)
STJ: 3 CM
TDI LATERAL: 0.07 M/S
TDI SEPTAL: 0.09 M/S
TDI: 0.08 M/S
TRICUSPID ANNULAR PLANE SYSTOLIC EXCURSION: 1.5 CM
TRIGL SERPL-MCNC: 71 MG/DL (ref 30–150)
TROPONIN I SERPL DL<=0.01 NG/ML-MCNC: 0.04 NG/ML
TROPONIN I SERPL DL<=0.01 NG/ML-MCNC: 0.04 NG/ML
WBC # BLD AUTO: 4.59 K/UL (ref 3.9–12.7)
Z-SCORE OF LEFT VENTRICULAR DIMENSION IN END DIASTOLE: -10.02
Z-SCORE OF LEFT VENTRICULAR DIMENSION IN END SYSTOLE: -6.87

## 2025-07-24 PROCEDURE — 25000003 PHARM REV CODE 250: Performed by: INTERNAL MEDICINE

## 2025-07-24 PROCEDURE — 83735 ASSAY OF MAGNESIUM: CPT | Performed by: REGISTERED NURSE

## 2025-07-24 PROCEDURE — 25500020 PHARM REV CODE 255: Performed by: INTERNAL MEDICINE

## 2025-07-24 PROCEDURE — G0378 HOSPITAL OBSERVATION PER HR: HCPCS

## 2025-07-24 PROCEDURE — 63600175 PHARM REV CODE 636 W HCPCS: Performed by: INTERNAL MEDICINE

## 2025-07-24 PROCEDURE — C1887 CATHETER, GUIDING: HCPCS | Performed by: INTERNAL MEDICINE

## 2025-07-24 PROCEDURE — 93458 L HRT ARTERY/VENTRICLE ANGIO: CPT | Performed by: INTERNAL MEDICINE

## 2025-07-24 PROCEDURE — 84484 ASSAY OF TROPONIN QUANT: CPT | Performed by: REGISTERED NURSE

## 2025-07-24 PROCEDURE — 36415 COLL VENOUS BLD VENIPUNCTURE: CPT | Performed by: REGISTERED NURSE

## 2025-07-24 PROCEDURE — 83718 ASSAY OF LIPOPROTEIN: CPT | Performed by: REGISTERED NURSE

## 2025-07-24 PROCEDURE — C1769 GUIDE WIRE: HCPCS | Performed by: INTERNAL MEDICINE

## 2025-07-24 PROCEDURE — 93458 L HRT ARTERY/VENTRICLE ANGIO: CPT | Mod: 26,,, | Performed by: INTERNAL MEDICINE

## 2025-07-24 PROCEDURE — 99152 MOD SED SAME PHYS/QHP 5/>YRS: CPT | Mod: ,,, | Performed by: INTERNAL MEDICINE

## 2025-07-24 PROCEDURE — 99205 OFFICE O/P NEW HI 60 MIN: CPT | Mod: 25,,, | Performed by: INTERNAL MEDICINE

## 2025-07-24 PROCEDURE — 99152 MOD SED SAME PHYS/QHP 5/>YRS: CPT | Performed by: INTERNAL MEDICINE

## 2025-07-24 PROCEDURE — 84075 ASSAY ALKALINE PHOSPHATASE: CPT | Performed by: REGISTERED NURSE

## 2025-07-24 PROCEDURE — 85025 COMPLETE CBC W/AUTO DIFF WBC: CPT | Performed by: REGISTERED NURSE

## 2025-07-24 PROCEDURE — C1894 INTRO/SHEATH, NON-LASER: HCPCS | Performed by: INTERNAL MEDICINE

## 2025-07-24 RX ORDER — IBUPROFEN 200 MG
16 TABLET ORAL
Status: DISCONTINUED | OUTPATIENT
Start: 2025-07-24 | End: 2025-07-24 | Stop reason: HOSPADM

## 2025-07-24 RX ORDER — HEPARIN SODIUM 200 [USP'U]/100ML
INJECTION, SOLUTION INTRAVENOUS
Status: DISCONTINUED | OUTPATIENT
Start: 2025-07-24 | End: 2025-07-24 | Stop reason: HOSPADM

## 2025-07-24 RX ORDER — AMLODIPINE BESYLATE 5 MG/1
5 TABLET ORAL DAILY
Status: ON HOLD | COMMUNITY
Start: 2025-02-09 | End: 2025-07-24 | Stop reason: HOSPADM

## 2025-07-24 RX ORDER — LIDOCAINE HYDROCHLORIDE 10 MG/ML
INJECTION, SOLUTION INFILTRATION; PERINEURAL
Status: DISCONTINUED | OUTPATIENT
Start: 2025-07-24 | End: 2025-07-24 | Stop reason: HOSPADM

## 2025-07-24 RX ORDER — NALOXONE HCL 0.4 MG/ML
0.02 VIAL (ML) INJECTION
Status: DISCONTINUED | OUTPATIENT
Start: 2025-07-24 | End: 2025-07-24 | Stop reason: HOSPADM

## 2025-07-24 RX ORDER — TAMSULOSIN HYDROCHLORIDE 0.4 MG/1
1 CAPSULE ORAL DAILY
Status: CANCELLED | OUTPATIENT
Start: 2025-07-24

## 2025-07-24 RX ORDER — NAPROXEN SODIUM 220 MG/1
81 TABLET, FILM COATED ORAL DAILY
Qty: 30 TABLET | Refills: 11 | Status: SHIPPED | OUTPATIENT
Start: 2025-07-24 | End: 2025-07-29 | Stop reason: SDUPTHER

## 2025-07-24 RX ORDER — ONDANSETRON HYDROCHLORIDE 2 MG/ML
4 INJECTION, SOLUTION INTRAVENOUS EVERY 8 HOURS PRN
Status: DISCONTINUED | OUTPATIENT
Start: 2025-07-24 | End: 2025-07-24 | Stop reason: HOSPADM

## 2025-07-24 RX ORDER — ACETAMINOPHEN 325 MG/1
650 TABLET ORAL EVERY 4 HOURS PRN
Status: DISCONTINUED | OUTPATIENT
Start: 2025-07-24 | End: 2025-07-24 | Stop reason: HOSPADM

## 2025-07-24 RX ORDER — ATORVASTATIN CALCIUM 40 MG/1
40 TABLET, FILM COATED ORAL DAILY
Qty: 90 TABLET | Refills: 3 | Status: SHIPPED | OUTPATIENT
Start: 2025-07-24 | End: 2025-07-29 | Stop reason: SDUPTHER

## 2025-07-24 RX ORDER — SODIUM CHLORIDE 0.9 % (FLUSH) 0.9 %
10 SYRINGE (ML) INJECTION EVERY 12 HOURS PRN
Status: DISCONTINUED | OUTPATIENT
Start: 2025-07-24 | End: 2025-07-24 | Stop reason: HOSPADM

## 2025-07-24 RX ORDER — IODIXANOL 320 MG/ML
INJECTION, SOLUTION INTRAVASCULAR
Status: DISCONTINUED | OUTPATIENT
Start: 2025-07-24 | End: 2025-07-24 | Stop reason: HOSPADM

## 2025-07-24 RX ORDER — NIFEDIPINE 30 MG/1
90 TABLET, EXTENDED RELEASE ORAL DAILY
Status: CANCELLED | OUTPATIENT
Start: 2025-07-24

## 2025-07-24 RX ORDER — MIDAZOLAM HYDROCHLORIDE 1 MG/ML
INJECTION INTRAMUSCULAR; INTRAVENOUS
Status: DISCONTINUED | OUTPATIENT
Start: 2025-07-24 | End: 2025-07-24 | Stop reason: HOSPADM

## 2025-07-24 RX ORDER — IBUPROFEN 200 MG
24 TABLET ORAL
Status: DISCONTINUED | OUTPATIENT
Start: 2025-07-24 | End: 2025-07-24 | Stop reason: HOSPADM

## 2025-07-24 RX ORDER — GLUCAGON 1 MG
1 KIT INJECTION
Status: DISCONTINUED | OUTPATIENT
Start: 2025-07-24 | End: 2025-07-24 | Stop reason: HOSPADM

## 2025-07-24 RX ORDER — INSULIN ASPART 100 [IU]/ML
0-5 INJECTION, SOLUTION INTRAVENOUS; SUBCUTANEOUS
Status: DISCONTINUED | OUTPATIENT
Start: 2025-07-24 | End: 2025-07-24 | Stop reason: HOSPADM

## 2025-07-24 RX ORDER — HEPARIN SODIUM 1000 [USP'U]/ML
INJECTION, SOLUTION INTRAVENOUS; SUBCUTANEOUS
Status: DISCONTINUED | OUTPATIENT
Start: 2025-07-24 | End: 2025-07-24 | Stop reason: HOSPADM

## 2025-07-24 RX ORDER — FENTANYL CITRATE 50 UG/ML
INJECTION, SOLUTION INTRAMUSCULAR; INTRAVENOUS
Status: DISCONTINUED | OUTPATIENT
Start: 2025-07-24 | End: 2025-07-24 | Stop reason: HOSPADM

## 2025-07-24 RX ORDER — VERAPAMIL HYDROCHLORIDE 2.5 MG/ML
INJECTION INTRAVENOUS
Status: DISCONTINUED | OUTPATIENT
Start: 2025-07-24 | End: 2025-07-24 | Stop reason: HOSPADM

## 2025-07-24 RX ADMIN — HUMAN ALBUMIN MICROSPHERES AND PERFLUTREN 0.11 MG: 10; .22 INJECTION, SOLUTION INTRAVENOUS at 08:07

## 2025-07-24 NOTE — PLAN OF CARE
3 cc of air removed from right radial vasc band. No hematoma noted. Will continue to monitor.Reached out to admit team and possible pt will be discharged home. Awaiting orders. Patient stood to urinate; Patient tolerated po fluids and lunch tray. Patient has no complaints.

## 2025-07-24 NOTE — H&P
[facility]  [facility] & Physical    Patient Name: Bhupinder Garces  MRN: [id number]  Patient Class: OP- Observation  Admission Date: 7/23/2025  Attending Physician: Sal Lewis,*   Primary Care Provider: Tracy, Primary Doctor         Patient information was obtained from patient and ER records.     Subjective:     Principal Problem:<principal problem not specified>    Chief Complaint:   Chief Complaint   Patient presents with    Chest Pain     Pt presents to the ED chest pain- onset 2 weeks ago.         HPI: Patient is a 64-year-old male with a history of HTN, prediabetes, BPH presenting to ED with substernal chest pain on and off for the last couple of weeks however worsened over the last 2 days noticing pain without exertion.  Denied shortness a breath.  Does report decrease in activity.  In ED labwork remarkable for troponin 0.036.  EKG sinus rhythm with first-degree AV block no STEMI.  Second troponin increased to 0.044.  Patient was given full-dose aspirin and loaded with Plavix.  Given therapeutic Lovenox and sublingual nitroglycerin.  Patient will be admitted to Hospital Medicine cardiology has been consulted we will keep patient NPO after midnight for presumed cardiac testing in a.m..    Past Medical History:   Diagnosis Date    Prostatitis        Past Surgical History:   Procedure Laterality Date    COLONOSCOPY N/A 5/7/2019    Procedure: COLONOSCOPY possible RBL ;  Surgeon: Fermin Wade MD;  Location: [address];  Service: Colon and Rectal;  Laterality: N/A;       Review of patient's allergies indicates:  No Known Allergies    No current facility-administered medications on file prior to encounter.     Current Outpatient Medications on File Prior to Encounter   Medication Sig    ammonium lactate (LAC-HYDRIN) 12 % lotion Apply topically 2 (two) times daily. To entire body. (Patient not taking: Reported on 6/19/2025)    bempedoic acid 180 mg Tab Take 1 tablet (180  mg total) by mouth once daily.    clobetasol 0.05% (TEMOVATE) 0.05 % Oint Apply topically 2 (two) times daily as needed (thick, itchy spots). Do not apply to face, armpits, or groin. Use for 2 weeks on, 1 week off, and repeat as needed. (Patient not taking: Reported on 6/19/2025)    clotrimazole-betamethasone 1-0.05% (LOTRISONE) cream Apply topically 2 (two) times daily as needed.    hydrocortisone 2.5 % cream APPLY TOPICALLY TO THE AFFECTED AREA TWICE DAILY (Patient not taking: Reported on 6/19/2025)    latanoprost 0.005 % ophthalmic solution Place 1 drop into both eyes every evening. (Patient not taking: Reported on 6/19/2025)    NIFEdipine (PROCARDIA-XL) 90 MG (OSM) 24 hr tablet Take 1 tablet (90 mg total) by mouth once daily.    tamsulosin (FLOMAX) 0.4 mg Cap Take 1 capsule (0.4 mg total) by mouth once daily. 30 minutes after dinner    timolol maleate 0.5% (TIMOPTIC) 0.5 % Drop Place 1 drop into both eyes every morning. (Patient not taking: Reported on 6/19/2025)    valACYclovir (VALTREX) 1000 MG tablet Take 1 tablet (1,000 mg total) by mouth once daily.     Family History    None       Tobacco Use    Smoking status: Never    Smokeless tobacco: Never   Substance and Sexual Activity    Alcohol use: No    Drug use: Never    Sexual activity: Not on file     Review of Systems   Constitutional:  Positive for activity change and fatigue.   Cardiovascular:  Positive for chest pain.   All other systems reviewed and are negative.    Objective:     Vital Signs (Most Recent):  Temp: 98.4 °F (36.9 °C) (07/23/25 1931)  Pulse: 68 (07/24/25 0203)  Resp: 18 (07/23/25 1931)  BP: (!) 121/58 (07/24/25 0203)  SpO2: (!) 94 % (07/24/25 0203) Vital Signs (24h Range):  Temp:  [98.4 °F (36.9 °C)] 98.4 °F (36.9 °C)  Pulse:  [68-90] 68  Resp:  [18] 18  SpO2:  [94 %-99 %] 94 %  BP: (121-187)/(58-91) 121/58     Weight: 122.5 kg (270 lb)  Body mass index is 35.62 kg/m².     Physical Exam  Vitals reviewed.   Constitutional:       Appearance:  Normal appearance.   HENT:      Head: Normocephalic and atraumatic.      Mouth/Throat:      Mouth: Mucous membranes are dry.      Pharynx: Oropharynx is clear.   Eyes:      Pupils: Pupils are equal, round, and reactive to light.   Cardiovascular:      Rate and Rhythm: Normal rate and regular rhythm.      Pulses: Normal pulses.      Heart sounds: Normal heart sounds.   Pulmonary:      Effort: Pulmonary effort is normal.      Breath sounds: Normal breath sounds.   Abdominal:      General: Bowel sounds are normal.      Palpations: Abdomen is soft.   Musculoskeletal:         General: Normal range of motion.      Cervical back: Normal range of motion.   Skin:     General: Skin is warm and dry.      Capillary Refill: Capillary refill takes less than 2 seconds.   Neurological:      General: No focal deficit present.      Mental Status: He is alert and oriented to person, place, and time. Mental status is at baseline.   Psychiatric:         Mood and Affect: Mood normal.         Behavior: Behavior normal.         Thought Content: Thought content normal.              CRANIAL NERVES     CN III, IV, VI   Pupils are equal, round, and reactive to light.       Significant Labs: All pertinent labs within the past 24 hours have been reviewed.  Recent Lab Results         07/23/25  2338   07/23/25 2018        Albumin   4.0       ALP   54       ALT   24       Anion Gap   7       AST   37       Baso #   0.03       Basophil %   0.5       BILIRUBIN TOTAL   0.5  Comment: For infants and newborns, interpretation of results should be based   on gestational age, weight and in agreement with clinical   observations.    Premature Infant recommended reference ranges:   0-24 hours:  <8.0 mg/dL   24-48 hours: <12.0 mg/dL   3-5 days:    <15.0 mg/dL   6-29 days:   <15.0 mg/dL       BNP   <10  Comment: Values of less than 100 pg/ml are consistent with non-CHF populations.        BUN   16       Calcium   9.6       Chloride   104       CO2   24        Creatinine   1.3       eGFR   >60  Comment: Estimated GFR calculated using the CKD-EPI creatinine (2021) equation.       Eos #   0.10       Eos %   1.6       Glucose   99       Gran # (ANC)   3.42       Hematocrit   37.7       Hemoglobin   12.6       Immature Grans (Abs)   0.01  Comment: Mild elevation in immature granulocytes is non specific and can be seen in a variety of conditions including stress response, acute inflammation, trauma and pregnancy. Correlation with other laboratory and clinical findings is essential.       Immature Granulocytes   0.2       Lymph #   1.99       Lymph %   32.2       Magnesium    1.9       MCH   28.8       MCHC   33.4       MCV   86       Mono #   0.63       Mono %   10.2       MPV   9.6       Neut %   55.3       nRBC   0       Platelet Count   297       Potassium   4.0       PROTEIN TOTAL   7.6       RBC   4.38       RDW   13.5       Sodium   135       Troponin I 0.044  Comment: The reference interval for Troponin I represents the 99th percentile cutoff for our facility and is consistent with 3rd generation assay performance.   0.036  Comment: The reference interval for Troponin I represents the 99th percentile cutoff for our facility and is consistent with 3rd generation assay performance.       TSH   1.838       WBC   6.18               Significant Imaging: I have reviewed all pertinent imaging results/findings within the past 24 hours.  I have reviewed and interpreted all pertinent imaging results/findings within the past 24 hours.  Assessment/Plan:     Assessment & Plan  BPH with urinary obstruction  Continue home Flomax    Statin intolerance  Inability to take statins  Patient on bempedoic acid at home  Check lipid panel this a.m.    NSTEMI (non-ST elevated myocardial infarction)  On and off chest pain past couple of weeks worsened over the past days.  Mildly elevated troponin  EKG with first-degree AV block, no ST-elevation.  Loaded with aspirin Plavix, Lovenox.  Sublingual  nitro  NPO after midnight  Consult Cardiology  Lipid panel in a.m.  VTE Risk Mitigation (From admission, onward)           Ordered     IP VTE HIGH RISK PATIENT  Once         07/24/25 0309     Place sequential compression device  Until discontinued         07/24/25 0309                                     On 07/24/2025, patient should be placed in hospital observation services under my care in collaboration with Dr. Lewis.           Freddie Cabrera NP  Department of Hospital Medicine  [facility] - Emergency Dept

## 2025-07-24 NOTE — DISCHARGE SUMMARY
[place] - Cath Lab ([facility])  [facility] Medicine  Discharge Summary      Patient Name: Bhupinder Garces  MRN: [id number]  WILLIE: [id number]  Patient Class: OP- Observation  Admission Date: 7/23/2025  Hospital Length of Stay: 0 days  Discharge Date and Time: 07/24/2025 2:33 PM  Attending Physician: Yesica Leigh MD   Discharging Provider: Yesica Leigh MD  Primary Care Provider: Tracy, Primary Doctor    Primary Care Team: Networked reference to record PCT     HPI:   Patient is a 64-year-old male with a history of HTN, prediabetes, BPH presenting to ED with substernal chest pain on and off for the last couple of weeks however worsened over the last 2 days noticing pain without exertion.  Denied shortness a breath.  Does report decrease in activity.  In ED labwork remarkable for troponin 0.036.  EKG sinus rhythm with first-degree AV block no STEMI.  Second troponin increased to 0.044.  Patient was given full-dose aspirin and loaded with Plavix.  Given therapeutic Lovenox and sublingual nitroglycerin.  Patient will be admitted to Hospital Medicine cardiology has been consulted we will keep patient NPO after midnight for presumed cardiac testing in a.m..    Procedure(s) (LRB):  Left heart cath (Left)      Hospital Course:   64-year-old male with a history of HTN, prediabetes, BPH presenting to ED with substernal chest pain on and off for the last couple of weeks however worsened over the last 2 days noticing pain without exertion. He had NSTEMI with mild elevated in troponin 0.044 -> 0.036. EKG without significant ST changes. Cardiology consulted who performed LHC which showed Nonobstructive coronary artery disease. He was discharged with ASA and high dose statin, without outpatient follow-up with cardiology and PCP.     Goals of Care Treatment Preferences:  Code Status: Full Code         Consults:   Consults (From admission, onward)          Status Ordering Provider     Inpatient consult to Cardiology  Once        Provider:   Humble De La Torre MD    Completed KAREL YANCEY            Assessment & Plan  BPH with urinary obstruction  Continue home Flomax    Statin intolerance (Resolved: 7/24/2025)      NSTEMI (non-ST elevated myocardial infarction)  On and off chest pain past couple of weeks worsened over the past days.  Mildly elevated troponin  EKG with first-degree AV block, no ST-elevation.  Loaded with aspirin Plavix, Lovenox in ED  - LHC showed nonobstructive coronary artery disease   - discharged with asa and atorvatatin 40 mg daily  Hypertension  Patient's blood pressure range in the last 24 hours was: BP  Min: 121/58  Max: 187/88.The patient's inpatient anti-hypertensive regimen is listed below    - resume home antihypertensives  Final Active Diagnoses:    Diagnosis Date Noted POA    NSTEMI (non-ST elevated myocardial infarction) [I21.4] 07/24/2025 Yes    Hypertension [I10] 12/31/2024 Yes    BPH with urinary obstruction [N40.1, N13.8] 06/05/2018 Yes      Problems Resolved During this Admission:    Diagnosis Date Noted Date Resolved POA    Statin intolerance [Z78.9] 06/19/2025 07/24/2025 Yes     Chronic       Discharged Condition: fair    Disposition:     Follow Up:   Follow-up Information       Humble De La Torre MD Follow up.    Specialties: Interventional Cardiology, Cardiovascular Disease, Cardiology, Internal Medicine  Contact information:  [address]  [address] [place] [id number] [phone]                           Patient Instructions:   No discharge procedures on file.    Significant Diagnostic Studies: Labs: BMP:   Recent Labs   Lab 07/23/25  2018 07/24/25  0643   GLU 99 102   * 135*   K 4.0 4.0    105   CO2 24 24   BUN 16 14   CREATININE 1.3 1.1   CALCIUM 9.6 8.8   MG 1.9 1.9    and CMP   Recent Labs   Lab 07/23/25  2018 07/24/25  0643   * 135*   K 4.0 4.0    105   CO2 24 24   GLU 99 102   BUN 16 14   CREATININE 1.3 1.1   CALCIUM 9.6 8.8   PROT 7.6 7.0   ALBUMIN 4.0 3.7   BILITOT  0.5 1.1*   ALKPHOS 54 49   AST 37 31   ALT 24 23   ANIONGAP 7* 6*     LHC:  Findings:   LM: Large, long caliber vessel.  No obstructive coronary artery disease. Trifurcates into LAD, Ramus and LCx   LAD: Large caliber vessel. mLAD with a 30% lesion and minimal luminal irregularities throughout the vessel. No obstructive coronary artery disease. LAD wraps around the apex. MISAEL 2 flow  Ramus:  Large caliber vessel. No obstructive disease.  MISAEL 2 flow  LCx: Large caliber, tortuous vessel.  Gives rise to a medium caliber OM1 vessel. No obstructive disease. MISAEL 2 flow  RCA:  Dominant, large caliber vessel.  Gives rise to PDA and PLV branches.  No obstructive disease. MISAEL 2 flow  LVEDP:  8 mmHg    Pending Diagnostic Studies:       None           Medications:  Reconciled Home Medications:      Medication List        START taking these medications      aspirin 81 MG Chew  Take 1 tablet (81 mg total) by mouth once daily.     atorvastatin 40 MG tablet  Commonly known as: LIPITOR  Take 1 tablet (40 mg total) by mouth once daily.            CONTINUE taking these medications      clotrimazole-betamethasone 1-0.05% cream  Commonly known as: LOTRISONE  Apply topically 2 (two) times daily as needed.     NEXLETOL 180 mg Tab  Generic drug: bempedoic acid  Take 1 tablet (180 mg total) by mouth once daily.     NIFEdipine 90 MG (OSM) 24 hr tablet  Commonly known as: PROCARDIA-XL  Take 1 tablet (90 mg total) by mouth once daily.     valACYclovir 1000 MG tablet  Commonly known as: VALTREX  Take 1 tablet (1,000 mg total) by mouth once daily.            STOP taking these medications      amLODIPine 5 MG tablet  Commonly known as: NORVASC     latanoprost 0.005 % ophthalmic solution     tamsulosin 0.4 mg Cap  Commonly known as: FLOMAX              Indwelling Lines/Drains at time of discharge:   Lines/Drains/Airways       None                       Time spent on the discharge of patient: 40 minutes         Yesica Leigh MD  Department of  [facility] Medicine  [place] [facility] ([facility])

## 2025-07-24 NOTE — NURSING
VN cued into pt's room for introduction with pt's permission.  VN role explained and informed pt that VN would be working with bedside nurse and the rest of the care team.  Fall risk and bed alarm protocol education provided.  Instructed pt to call for assistance and agreeable.  Allowed time for questions. NAD noted.        07/24/25 1035   Admission   Communication Issues? None   Shift   Virtual Nurse - Patient Verbalized Approval Of Camera Use;VN Rounding   Safety/Activity   Patient Rounds call light in patient/parent reach;visualized patient   Safety Promotion/Fall Prevention Fall Risk reviewed with patient/family;instructed to call staff for mobility   Pain/Comfort/Sleep   Preferred Pain Scale number (Numeric Rating Pain Scale)   Pain Rating (0-10): Rest 0     .

## 2025-07-24 NOTE — ASSESSMENT & PLAN NOTE
On and off chest pain past couple of weeks worsened over the past days.  Mildly elevated troponin  EKG with first-degree AV block, no ST-elevation.  Loaded with aspirin Plavix, Lovenox in ED  - C showed nonobstructive coronary artery disease   - discharged with asa and atorvatatin 40 mg daily

## 2025-07-24 NOTE — ED PROVIDER NOTES
Encounter Date: 7/23/2025       History     Chief Complaint   Patient presents with    Chest Pain     Pt presents to the ED chest pain- onset 2 weeks ago.      64 yr old male presents to the ER with reports of chest tightness that has been ongoing for the past few weeks. Pt states the pain has caused him to not want to work out which he states he is typically very active. Denies sob. No coughing or uri symptoms. NO nausea, vomiting or diarrhea. Pt states no recent traveling or periods of immobility. PMH of prostatitis, HTN, BPH and hyperlipidemia.     The history is provided by the patient. No  was used.     Review of patient's allergies indicates:  No Known Allergies  Past Medical History:   Diagnosis Date    Prostatitis      Past Surgical History:   Procedure Laterality Date    COLONOSCOPY N/A 5/7/2019    Procedure: COLONOSCOPY possible RBL ;  Surgeon: Fermin Wade MD;  Location: [address];  Service: Colon and Rectal;  Laterality: N/A;     No family history on file.  Social History[1]  Review of Systems   Respiratory:  Positive for chest tightness.    Cardiovascular:  Positive for chest pain.       Physical Exam     Initial Vitals [07/23/25 1931]   BP Pulse Resp Temp SpO2   (!) 179/91 90 18 98.4 °F (36.9 °C) 98 %      MAP       --         Physical Exam    Constitutional: He appears well-developed and well-nourished. He is not diaphoretic. No distress.   HENT:   Head: Normocephalic and atraumatic.   Right Ear: Hearing and tympanic membrane normal.   Left Ear: Hearing and tympanic membrane normal.   Nose: Nose normal. Mouth/Throat: Uvula is midline, oropharynx is clear and moist and mucous membranes are normal.   Eyes: Lids are normal. Pupils are equal, round, and reactive to light.   Cardiovascular:  Normal rate.           Pulmonary/Chest: Effort normal and breath sounds normal. No respiratory distress. He has no wheezes. He has no rhonchi.   Abdominal: Abdomen is soft. There is no  abdominal tenderness.   Musculoskeletal:         General: Normal range of motion.      Cervical back: No rigidity.     Neurological: He is oriented to person, place, and time.   Skin: Skin is warm and dry. No rash noted.   Psychiatric: He has a normal mood and affect. His behavior is normal. Judgment and thought content normal.         ED Course   Procedures  Labs Reviewed   COMPREHENSIVE METABOLIC PANEL - Abnormal       Result Value    Sodium 135 (*)     Potassium 4.0      Chloride 104      CO2 24      Glucose 99      BUN 16      Creatinine 1.3      Calcium 9.6      Protein Total 7.6      Albumin 4.0      Bilirubin Total 0.5      ALP 54      AST 37      ALT 24      Anion Gap 7 (*)     eGFR >60     TROPONIN I - Abnormal    Troponin-I 0.036 (*)    CBC WITH DIFFERENTIAL - Abnormal    WBC 6.18      RBC 4.38 (*)     HGB 12.6 (*)     HCT 37.7 (*)     MCV 86      MCH 28.8      MCHC 33.4      RDW 13.5      Platelet Count 297      MPV 9.6      Nucleated RBC 0      Neut % 55.3      Lymph % 32.2      Mono % 10.2      Eos % 1.6      Basophil % 0.5      Imm Grans % 0.2      Neut # 3.42      Lymph # 1.99      Mono # 0.63      Eos # 0.10      Baso # 0.03      Imm Grans # 0.01     B-TYPE NATRIURETIC PEPTIDE - Normal    BNP <10     MAGNESIUM - Normal    Magnesium  1.9     TSH - Normal    TSH 1.838     CBC W/ AUTO DIFFERENTIAL    Narrative:     The following orders were created for panel order CBC auto differential.  Procedure                               Abnormality         Status                     ---------                               -----------         ------                     CBC with Differential[[id number]]       Abnormal            Final result                 Please view results for these tests on the individual orders.   TROPONIN I          Imaging Results              X-Ray Chest AP Portable (Final result)  Result time 07/23/25 21:19:44      Final result by Krian Simon DO (07/23/25 21:19:44)                    Impression:      No acute abnormality.      Electronically signed by: Kiran Simon  Date:    07/23/2025  Time:    21:19               Narrative:    EXAMINATION:  XR CHEST AP PORTABLE    CLINICAL HISTORY:  Chest Pain;    TECHNIQUE:  Single frontal view of the chest was performed.    COMPARISON:  11/26/2024.    FINDINGS:  The lungs are well expanded and clear. No focal opacities are seen. The pleural spaces are clear. The cardiac silhouette is unremarkable. The visualized osseous structures are unremarkable.                                       Medications   clopidogreL tablet 600 mg (has no administration in time range)   enoxaparin injection 100 mg (has no administration in time range)   nitroGLYCERIN SL tablet 0.4 mg (has no administration in time range)   aspirin tablet 325 mg (325 mg Oral Given 7/23/25 2036)     Medical Decision Making  Differential Diagnosis includes, but is not limited to:  ACS/MI, PE, aortic dissection, pneumothorax, cardiac tamponade, pericarditis/myocarditis, pneumonia, infection/abscess, lung mass, trauma/fracture, costochondritis/pleurisy, MSK pain/contusion, GERD, biliary disease, pancreatitis, anemia     Amount and/or Complexity of Data Reviewed  Labs: ordered. Decision-making details documented in ED Course.    Risk  OTC drugs.  Prescription drug management.  Decision regarding hospitalization.  Risk Details: Plan for admission for ACS work up and rule out. Cards consulted. Admitted to [facility].       Additional MDM:   Heart Score:    History:          Moderately suspicious.  ECG:             Normal  Age:               >65 years  Risk factors: 1-2 risk factors  Troponin:       1-2x normal limit  Heart Score = 5                ED Course as of 07/23/25 2225 Wed Jul 23, 2025 2030 CBC auto differential(!) [DT]   2055 Comprehensive metabolic panel(!) [DT]   2058 Troponin I #1(!) [DT]   2106 BNP [DT]   2129 EKG with rate of 82, NSR with 1st degree AV block, no stemi noted.  [DT]    2129 Pt did have some elevation in the troponin of .036. Plan for admission for repeat cardiac enzymes and cards consult.  [DT]   2131 Magnesium [DT]   2135 On the line with Dr. De La Torre. Plan for Nitro Sl, Load with Plavix and lovenox dose. Will add serial troponin and placed consult. Plan for admission to [facility].  [DT]   2225 TSH [DT]      ED Course User Index  [DT] Kassy Lunsford NP                               Clinical Impression:  Final diagnoses:  [R07.9] Chest pain          ED Disposition Condition    Observation                       Kassy Lunsford NP  07/23/25 2143       Kassy Lunsford NP  07/23/25 2143       [1]   Social History  Tobacco Use    Smoking status: Never    Smokeless tobacco: Never   Substance Use Topics    Alcohol use: No    Drug use: Never        Kassy Lunsford NP  07/23/25 2225

## 2025-07-24 NOTE — CONSULTS
"    [facility] Note     HPI/Interval:       Bhupinder Garces is a pleasant 64 y.o. male with PMH of prostatitis, HTN, BPH and hyperlipidemia presented to the ER with reports of substernal chest pain and tightness that has been ongoing for the past few weeks. Pt states the pain worsened over the last 2 days, sharp in nature. Denies SOB, palpitation and edema.     Troponin elevated at 0.036. Second troponin increased to 0.044. . EKG sinus rhythm with first-degree AV block no STEMI.     History obtained by patient interview and supplemented by nursing documentation and chart review.   Objective     PMHx:  has a past medical history of Prostatitis.   SurgHx:  has a past surgical history that includes Colonoscopy (N/A, 2019).   FamHx: family history is not on file.   SocialHx:  reports that he has never smoked. He has never used smokeless tobacco. He reports that he does not drink alcohol and does not use drugs.  Home Meds:  Current Outpatient Medications   Medication Instructions    amLODIPine (NORVASC) 5 mg, Daily    clotrimazole-betamethasone 1-0.05% (LOTRISONE) cream 2 times daily PRN    latanoprost 0.005 % ophthalmic solution 1 drop, Nightly    NEXLETOL 180 mg, Oral, Daily    NIFEdipine (PROCARDIA-XL) 90 mg, Oral, Daily    tamsulosin (FLOMAX) 0.4 mg, Oral, Daily, 30 minutes after dinner    valACYclovir (VALTREX) 1,000 mg, Oral, Daily     Review of Systems: Comprehensive ROS was performed and is negative unless otherwise noted in HPI.     Objective:   Last 24 Hour Vital Signs:  BP  Min: 121/58  Max: 187/88  Temp  Av.2 °F (36.8 °C)  Min: 98 °F (36.7 °C)  Max: 98.4 °F (36.9 °C)  Pulse  Av  Min: 65  Max: 90  Resp  Av.3  Min: 15  Max: 24  SpO2  Av.9 %  Min: 94 %  Max: 99 %  Height  Av' 1" (185.4 cm)  Min: 6' 1" (185.4 cm)  Max: 6' 1" (185.4 cm)  Weight  Av.5 kg (270 lb)  Min: 122.5 kg (270 lb)  Max: 122.5 kg (270 lb)  No intake/output data recorded.    Physical " Examination:  Constitutional: NAD, Atraumatic   HEENT: MMM  Cardiovascular: RRR, no murmurs noted, no chest tenderness to palpation, 2+ radial pulses b/l  Pulmonary: normal respiratory effort, CTAB, no crackles, wheezes  Abdominal: S/NT/ND  Musculoskeletal: No lower extremity edema noted  Neurological: Alert & oriented to self, location, time and situation. No gross neurological deficits  Skin: W/D/I  Psych: Appropriate affect, normal mood    Laboratory:  Personally reviewed    Other Results:  TTE:  Results for orders placed during the hospital encounter of 07/23/25    Echo    Interpretation Summary    Left Ventricle: The left ventricle is normal in size. Normal wall thickness. Regional wall motion abnormalities present. See diagram for wall motion findings. There is low normal systolic function with a visually estimated ejection fraction of 50 - 55%. Quantitated ejection fraction is 53%. Grade I diastolic dysfunction.    Right Ventricle: The right ventricle is mildly dilated measuring 4.2 cm. Systolic function is normal.    Aorta: The aortic root is normal in size measuring 3.9 cm. The proximal ascending aorta is mildly dilated measuring 4.1 cm.    Pulmonary Artery: Pulmonary artery pressure could not be estimated.    Normal valvular structure and function.    Stress Testing:   No results found for this or any previous visit.     Coronary Angiogram:  No results found for this or any previous visit.      Time spent on this visit included personally:  -Reviewing Medical records & lab results  -Independently reviewing and interpreting (if not documented by myself) EKGs, echocardiograms, catherizations   -Obtaining a history, performing a relevant exam, counseling/educating the patient/family  -Documenting clinical information in the EMR including ordering of tests  -Care coordination and communicating with other health care providers         Assessment/Plan:     Active Hospital Problems    Diagnosis    NSTEMI (non-ST  elevated myocardial infarction)    Statin intolerance    BPH with urinary obstruction       Bhupinder Garces is a 64 y.o. male with  with PMH of prostatitis, HTN, BPH and hyperlipidemia presented to the ER with reports of substernal chest pain and tightness that has been ongoing for the past few weeks. Pt states the pain worsened over the last 2 days, sharp in nature and aggravated by exertion. Denies SOB, palpitation and edema.     - Chest pain, likely cardiac in origin.  - Elevated troponin trending up from 0.03 to 0.044 concerning for possible NSTEMI  - Echo today  - Coronary Angiography today  - Keep patient NPO  - Continue cardiac monitoring      Thank you for the opportunity to care for this patient. Please don't hesitate to reach out with any questions/concerns.     _________________________________________  Iman Currie MD, PGY-1  [facility] - [place]

## 2025-07-24 NOTE — CONSULTS
[place] - Emergency Dept  Cardiology  Consult Note    Patient Name: Bhupinder Garces  MRN: [id number]  Admission Date: 7/23/2025  Hospital Length of Stay: 0 days  Code Status: Full Code   Attending Provider: Yesica Leigh MD   Consulting Provider: Sohan Kincaid NP  Primary Care Physician: Tracy, Primary Doctor  Principal Problem:<principal problem not specified>    Patient information was obtained from patient and ER records.     Inpatient consult to Cardiology  Consult performed by: Sohan Kincaid NP  Consult ordered by: Kassy Lunsford NP        Subjective:           See consult from Dr Currie from this AM      No new subjective & objective note has been filed under this hospital service since the last note was generated.    Assessment and Plan:     No notes have been filed under this hospital service.  Service: Cardiology      VTE Risk Mitigation (From admission, onward)           Ordered     IP VTE HIGH RISK PATIENT  Once         07/24/25 0309     Place sequential compression device  Until discontinued         07/24/25 0309                    Thank you for your consult. I will follow-up with patient. Please contact us if you have any additional questions.    Sohan Kincaid NP  Cardiology   [place] - Emergency Dept

## 2025-07-24 NOTE — ED TRIAGE NOTES
Patient states he is healthy, goes to the gym regularly, eats healthy, and avoids ETOH. Over past 2 weeks, he endorses generalized body aches, intermittent chest pain, low back pain, and concentrated urine. Also reports itchy skin. Takes medication as directed with no recent changes. Takes Tylenol prn pain. Denies N/V, fever. Presents awake, alert. Also reports intermittent irregular heart rate.

## 2025-07-24 NOTE — SUBJECTIVE & OBJECTIVE
Past Medical History:   Diagnosis Date    Prostatitis        Past Surgical History:   Procedure Laterality Date    COLONOSCOPY N/A 5/7/2019    Procedure: COLONOSCOPY possible RBL ;  Surgeon: Fermin Wade MD;  Location: [address]);  Service: Colon and Rectal;  Laterality: N/A;       Review of patient's allergies indicates:  No Known Allergies    No current facility-administered medications on file prior to encounter.     Current Outpatient Medications on File Prior to Encounter   Medication Sig    ammonium lactate (LAC-HYDRIN) 12 % lotion Apply topically 2 (two) times daily. To entire body. (Patient not taking: Reported on 6/19/2025)    bempedoic acid 180 mg Tab Take 1 tablet (180 mg total) by mouth once daily.    clobetasol 0.05% (TEMOVATE) 0.05 % Oint Apply topically 2 (two) times daily as needed (thick, itchy spots). Do not apply to face, armpits, or groin. Use for 2 weeks on, 1 week off, and repeat as needed. (Patient not taking: Reported on 6/19/2025)    clotrimazole-betamethasone 1-0.05% (LOTRISONE) cream Apply topically 2 (two) times daily as needed.    hydrocortisone 2.5 % cream APPLY TOPICALLY TO THE AFFECTED AREA TWICE DAILY (Patient not taking: Reported on 6/19/2025)    latanoprost 0.005 % ophthalmic solution Place 1 drop into both eyes every evening. (Patient not taking: Reported on 6/19/2025)    NIFEdipine (PROCARDIA-XL) 90 MG (OSM) 24 hr tablet Take 1 tablet (90 mg total) by mouth once daily.    tamsulosin (FLOMAX) 0.4 mg Cap Take 1 capsule (0.4 mg total) by mouth once daily. 30 minutes after dinner    timolol maleate 0.5% (TIMOPTIC) 0.5 % Drop Place 1 drop into both eyes every morning. (Patient not taking: Reported on 6/19/2025)    valACYclovir (VALTREX) 1000 MG tablet Take 1 tablet (1,000 mg total) by mouth once daily.     Family History    None       Tobacco Use    Smoking status: Never    Smokeless tobacco: Never   Substance and Sexual Activity    Alcohol use: No    Drug use: Never     Sexual activity: Not on file     Review of Systems   Constitutional:  Positive for activity change and fatigue.   Cardiovascular:  Positive for chest pain.   All other systems reviewed and are negative.    Objective:     Vital Signs (Most Recent):  Temp: 98.4 °F (36.9 °C) (07/23/25 1931)  Pulse: 68 (07/24/25 0203)  Resp: 18 (07/23/25 1931)  BP: (!) 121/58 (07/24/25 0203)  SpO2: (!) 94 % (07/24/25 0203) Vital Signs (24h Range):  Temp:  [98.4 °F (36.9 °C)] 98.4 °F (36.9 °C)  Pulse:  [68-90] 68  Resp:  [18] 18  SpO2:  [94 %-99 %] 94 %  BP: (121-187)/(58-91) 121/58     Weight: 122.5 kg (270 lb)  Body mass index is 35.62 kg/m².     Physical Exam  Vitals reviewed.   Constitutional:       Appearance: Normal appearance.   HENT:      Head: Normocephalic and atraumatic.      Mouth/Throat:      Mouth: Mucous membranes are dry.      Pharynx: Oropharynx is clear.   Eyes:      Pupils: Pupils are equal, round, and reactive to light.   Cardiovascular:      Rate and Rhythm: Normal rate and regular rhythm.      Pulses: Normal pulses.      Heart sounds: Normal heart sounds.   Pulmonary:      Effort: Pulmonary effort is normal.      Breath sounds: Normal breath sounds.   Abdominal:      General: Bowel sounds are normal.      Palpations: Abdomen is soft.   Musculoskeletal:         General: Normal range of motion.      Cervical back: Normal range of motion.   Skin:     General: Skin is warm and dry.      Capillary Refill: Capillary refill takes less than 2 seconds.   Neurological:      General: No focal deficit present.      Mental Status: He is alert and oriented to person, place, and time. Mental status is at baseline.   Psychiatric:         Mood and Affect: Mood normal.         Behavior: Behavior normal.         Thought Content: Thought content normal.              CRANIAL NERVES     CN III, IV, VI   Pupils are equal, round, and reactive to light.       Significant Labs: All pertinent labs within the past 24 hours have been  reviewed.  Recent Lab Results         07/23/25  2338   07/23/25 2018        Albumin   4.0       ALP   54       ALT   24       Anion Gap   7       AST   37       Baso #   0.03       Basophil %   0.5       BILIRUBIN TOTAL   0.5  Comment: For infants and newborns, interpretation of results should be based   on gestational age, weight and in agreement with clinical   observations.    Premature Infant recommended reference ranges:   0-24 hours:  <8.0 mg/dL   24-48 hours: <12.0 mg/dL   3-5 days:    <15.0 mg/dL   6-29 days:   <15.0 mg/dL       BNP   <10  Comment: Values of less than 100 pg/ml are consistent with non-CHF populations.        BUN   16       Calcium   9.6       Chloride   104       CO2   24       Creatinine   1.3       eGFR   >60  Comment: Estimated GFR calculated using the CKD-EPI creatinine (2021) equation.       Eos #   0.10       Eos %   1.6       Glucose   99       Gran # (ANC)   3.42       Hematocrit   37.7       Hemoglobin   12.6       Immature Grans (Abs)   0.01  Comment: Mild elevation in immature granulocytes is non specific and can be seen in a variety of conditions including stress response, acute inflammation, trauma and pregnancy. Correlation with other laboratory and clinical findings is essential.       Immature Granulocytes   0.2       Lymph #   1.99       Lymph %   32.2       Magnesium    1.9       MCH   28.8       MCHC   33.4       MCV   86       Mono #   0.63       Mono %   10.2       MPV   9.6       Neut %   55.3       nRBC   0       Platelet Count   297       Potassium   4.0       PROTEIN TOTAL   7.6       RBC   4.38       RDW   13.5       Sodium   135       Troponin I 0.044  Comment: The reference interval for Troponin I represents the 99th percentile cutoff for our facility and is consistent with 3rd generation assay performance.   0.036  Comment: The reference interval for Troponin I represents the 99th percentile cutoff for our facility and is consistent with 3rd generation assay  performance.       TSH   1.838       WBC   6.18               Significant Imaging: I have reviewed all pertinent imaging results/findings within the past 24 hours.  I have reviewed and interpreted all pertinent imaging results/findings within the past 24 hours.

## 2025-07-24 NOTE — ASSESSMENT & PLAN NOTE
Patient's blood pressure range in the last 24 hours was: BP  Min: 121/58  Max: 187/88.The patient's inpatient anti-hypertensive regimen is listed below    - resume home antihypertensives

## 2025-07-24 NOTE — HOSPITAL COURSE
64-year-old male with a history of HTN, prediabetes, BPH presenting to ED with substernal chest pain on and off for the last couple of weeks however worsened over the last 2 days noticing pain without exertion. He had NSTEMI with mild elevated in troponin 0.044 -> 0.036. EKG without significant ST changes. Cardiology consulted who performed LHC which showed Nonobstructive coronary artery disease. He was discharged with ASA and high dose statin, without outpatient follow-up with cardiology and PCP.

## 2025-07-24 NOTE — PLAN OF CARE
Patient ambulatory to bathroom, family in lobby and updated. Patient aao, vss and has no complaints.

## 2025-07-24 NOTE — PLAN OF CARE
Patient transferred to recovery cath lab via stretcher with side rails up x2 and bedside report received from nurse Robin . Pt is alert and able to follow commands. Pt is stable when connecting to cardiac monitors--- sinus on monitor.  VSS. Right radial vasc band in place with 12ml of air in band c.d.i. no drainage noted,no redness, bruising, or hematoma noted around site. +2 bilateral radial pulses palpated. bilateral DP pedal pulses palpable.  Skin normal in color and warm to touch, <3 sec cap refill.  Fall risk precautions  and post procedure teaching begun and patient acknowledges. NS infusing in hand off report. Will continue to monitor. Awaiting a ready tele admit bed and pt is aware.

## 2025-07-24 NOTE — HPI
Patient is a 64-year-old male with a history of HTN, prediabetes, BPH presenting to ED with substernal chest pain on and off for the last couple of weeks however worsened over the last 2 days noticing pain without exertion.  Denied shortness a breath.  Does report decrease in activity.  In ED labwork remarkable for troponin 0.036.  EKG sinus rhythm with first-degree AV block no STEMI.  Second troponin increased to 0.044.  Patient was given full-dose aspirin and loaded with Plavix.  Given therapeutic Lovenox and sublingual nitroglycerin.  Patient will be admitted to Hospital Medicine cardiology has been consulted we will keep patient NPO after midnight for presumed cardiac testing in a.m..

## 2025-07-24 NOTE — ED NOTES
Bed: EXAM 16  Expected date:   Expected time:   Means of arrival:   Comments:  Dez  
EKG done at 1933  
MD notified of Patient's elevated RR. Pt is lying in bed RR are even and unlabored   
Patient seen in room and daughter at bedside. Patient explained plan for the day to go to Cath Lab. Patient verbalized understanding. Call made from Cath lab and report given. Patient in no distress at this time.   
Patient seen in room with wife at bedside. Patient in no distress at this time and reports no chest pain at all. Patient alert, orientated and in no distress at this time. Patient asked for plan for the day. Explanation on necessity of cardiologist and possible angiogram explained. Patient verbalized understanding.   
Pt seen and examined. 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 Stable mental status, awake and able to communicate using eye reader. Resp status stable on mech ventilation, FIO2 at 30%, PEEP at 6. CTPA negative for PE/ pneumonia, few R sided indeterminate nodules. Remains on empiric ABx coverage with Zosyn and received a dose of Vanc. Prior UCxs showing sensitive Klebsiella, Citrobacter and Enterococcus. Ongoing sinus tachycardia to 130s, BP improving, off pressors, IVC < 1 cm on POCUS, bolus with LR. Cont close monitoring of hemodynamics, keep MAP > 65. Bedside echo showing reduced LV function with possible segmental wall motion abnormalities. Troponin trending down, EKG with non specific T wave changes. Check formal TTE, cards evalv. .Cont pulm toilet to mobilize secretions. Cr stable, cont to follow UO/ electrolytes. Hb 6.7, no obvious source of bleeding, received 1 unit PRBC, FUP post transfusion CBC. Follow serial CBCs and transfuse to keep Hb > 7. Ct abd/pelvis showing G tube balloon inflated in duodenum. IR consulted for adjustment. Remains full code. Overall prognosis guarded.
Pt seen and examined. 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 Stable mental status, awake and able to communicate using eye reader. Resp status stable on mech ventilation, FIO2 at 30%, PEEP at 6. CTPA negative for PE/ pneumonia, few R sided indeterminate nodules. Cont pulm toilet to mobilize secretions. Spiked to 100.6 overnight, progressive leukocytosis. MRSA PCR negative, Cxs so far NGTD, remains on empiric ABx coverage with Zosyn. Per son outpatient urine Cx was pansensitive E coli. Shock sate overnight requiring pressor support with Levophed gtt. Now titrated off, remains on midodrine and Droxidopa, keep MAP >65. Sinus tachycardia improved to early 100s. TTE confirming severe reduction in LV function with Ef of 33 % and multiple segmental WM abnormalities. No ischemic work up advised by Cardiology. Cont to optimize medical management. Cr stable, noted to have anasarca, trial of gentle diuresis with IV Lasix 20 mg, cont to follow UO/ electrolytes. Hb stable post transfusion, no evidence of bleeding, cont to follow serial CBCs and transfuse to keep Hb > 7. G-tube replaced by IR yesterday, now noted to have significant abdominal distension. Abdominal x-ray with distended bowel loops. Cont close monitoring with serial abdominal exams. Tolerating feeds and having BMs. If has diarrhea check c diff. Remains full code. Overall prognosis extremely guarded, remains at high risk for sudden deterioration and hemodynamic compromise. Son updated in detail at bedside.

## 2025-07-24 NOTE — PHARMACY MED REC
"        [facility]           Pharmacy  Admission Medication History     The home medication history was taken by Porsha Ryder.      Medication history obtained from Medications listed below were obtained from: [employer] software- [employer]    Based on information gathered for medication list, you may go to "Admission" then "Reconcile Home Medications" tabs to review and/or act upon those items.     The home medication list has been updated by the Pharmacy department.   Please read ALL comments highlighted in yellow.   Please address this information as you see fit.    Feel free to contact us if you have any questions or require assistance.    The medications listed below were removed from the home medication list.  Please reorder if appropriate:    Patient reports NOT TAKING the following medication(s):  Lac hydrin lotion  Temovate oint  Hydrocortisone cream  Timoptic drop      No current facility-administered medications on file prior to encounter.     Current Outpatient Medications on File Prior to Encounter   Medication Sig Dispense Refill    bempedoic acid 180 mg Tab Take 1 tablet (180 mg total) by mouth once daily. 30 tablet 11    NIFEdipine (PROCARDIA-XL) 90 MG (OSM) 24 hr tablet Take 1 tablet (90 mg total) by mouth once daily. 30 tablet 11    valACYclovir (VALTREX) 1000 MG tablet Take 1 tablet (1,000 mg total) by mouth once daily. 90 tablet 3    clotrimazole-betamethasone 1-0.05% (LOTRISONE) cream Apply topically 2 (two) times daily as needed.         Please address this information as you see fit.  Feel free to contact us if you have any questions or require assistance.    Porsha Ryder  [phone]          .          "

## 2025-07-24 NOTE — ASSESSMENT & PLAN NOTE
On and off chest pain past couple of weeks worsened over the past days.  Mildly elevated troponin  EKG with first-degree AV block, no ST-elevation.  Loaded with aspirin Plavix, Lovenox.  Sublingual nitro  NPO after midnight  Consult Cardiology  Lipid panel in a.m.

## 2025-07-24 NOTE — BRIEF OP NOTE
"Date of Procedure: 07/24/2025    Interventional Cardiologist: Humble De La Torre MD  General Cardiology Fellow: Wilma Ronquillo MD    Pre-Operative Diagnosis: Progressive angina  Post-Operative Diagnosis: Non obstructive CAD    Procedures Performed:   Right radial artery ultrasound guided access.   Left heart catheterization.   Coronary angiography.     Diagnostic Catheter: 5 Fr Woodgate catheter.   Contrast: 70 cc  Hemostasis: Vascband 15 cc of air.     Description:   The patient was brought to the cath lab in a fasting state. The patient was prepped and draped in the usual sterile fashion. Time out was performed. Moderate sedation was provided prior to the start of the case. The access site was anesthetized locally with use of lidocaine using 2 cc. Access to the right artery was obtained using a radial artery access kit. A 6 Azeri sheath was placed into the right radial artery and a radial cocktail was administered. An 0.035" J wire was advanced to the level of the aortic valve. Heparin was administered. A 5 Fr Woodgate catheter was then subsequently advanced to the level of the aortic valve. We then crossed the aortic valve with the 0.035" J wire and advanced the 5 Fr Woodgate catheter into the left ventricle. The 0.035" J wire was subsequently removed and the catheter was aspirated and flushed. The LVEDP was obtained and measured 8 mmHg. A pullback was performed and demonstrated no significant gradient. We then turned our attention to performing coronary angiography. The catheter was directed to the ostium of the left main coronary artery and successfully engaged. Coronary angiography was performed of the left system and multiple orthogonal views were obtained. We then subsequently disengaged from the left coronary artery and engaged the ostium of the right coronary artery. Coronary angiography of the right coronary artery was performed and multiple orthogonal views were obtained. The catheter was subsequently " "removed over the 0.035" J wire and the radial sheath was flushed. The procedure was deemed completed and the patient left the catheterization lab in stable condition without noted complications.       Findings:   LM: Large, long caliber vessel.  No obstructive coronary artery disease. Trifurcates into LAD, Ramus and LCx   LAD: Large caliber vessel. mLAD with a 30% lesion and minimal luminal irregularities throughout the vessel. No obstructive coronary artery disease. LAD wraps around the apex. MISAEL 2 flow  Ramus:  Large caliber vessel. No obstructive disease.  MISAEL 2 flow  LCx: Large caliber, tortuous vessel.  Gives rise to a medium caliber OM1 vessel. No obstructive disease. MISAEL 2 flow  RCA:  Dominant, large caliber vessel.  Gives rise to PDA and PLV branches.  No obstructive disease. MISAEL 2 flow  LVEDP:  8 mmHg    Recommendations:   Post procedural protocol.   Nonobstructive coronary artery disease  Continue aspirin, high-intensity statin  Aggressive cardiovascular risk factor modification with weight loss, blood pressure control, dietary modifications  Ok to discharge from a cardiac standpoint with outpatient follow up  "

## 2025-07-24 NOTE — FIRST PROVIDER EVALUATION
Emergency Department TeleTriage Encounter Note      CHIEF COMPLAINT    Chief Complaint   Patient presents with    Chest Pain     Pt presents to the ED chest pain- onset 2 weeks ago.        VITAL SIGNS   Initial Vitals [07/23/25 1931]   BP Pulse Resp Temp SpO2   (!) 179/91 90 18 98.4 °F (36.9 °C) 98 %      MAP       --            ALLERGIES    Review of patient's allergies indicates:  No Known Allergies    PROVIDER TRIAGE NOTE  Patient presents with chest pain, shortness of breath worse with exertion and edema to lower extremities.       ORDERS  Labs Reviewed - No data to display    ED Orders (720h ago, onward)      None              Virtual Visit Note: The provider triage portion of this emergency department evaluation and documentation was performed via [employer], a HIPAA-compliant telemedicine application, in concert with a tele-presenter in the room. A face to face patient evaluation with one of my colleagues will occur once the patient is placed in an emergency department room.      DISCLAIMER: This note was prepared with [employer] voice recognition transcription software. Garbled syntax, mangled pronouns, and other bizarre constructions may be attributed to that software system.

## 2025-07-24 NOTE — PLAN OF CARE
Patient cleared for discharge and has belongings and dressed self and ambulated to [facility]. Patient has discharge instructions and family has the rx meds from pharmacy to start at home. Iv drsg cdi rt a/c. Right radial drsg cdi, no bleeding, no hematoma. Patient escorted to pov to family per [facility] with nurse Lloyd.

## 2025-07-25 ENCOUNTER — PATIENT MESSAGE (OUTPATIENT)
Dept: ADMINISTRATIVE | Facility: CLINIC | Age: 65
End: 2025-07-25
Payer: MEDICARE

## 2025-07-25 ENCOUNTER — PATIENT OUTREACH (OUTPATIENT)
Dept: ADMINISTRATIVE | Facility: CLINIC | Age: 65
End: 2025-07-25
Payer: MEDICARE

## 2025-07-25 LAB
OHS CV CPX PATIENT HEIGHT IN: 73
OHS QRS DURATION: 76 MS
OHS QTC CALCULATION: 383 MS

## 2025-07-25 NOTE — PROGRESS NOTES
C3 nurse attempted to contact Bhupinder Garces for a TCC post hospital discharge follow up call. No answer. Left voicemail with callback information. The patient has a scheduled HOSFU appointment with Elzbieta Guevara on 07/29/2025 @ 7019.

## 2025-07-28 NOTE — PROGRESS NOTES
[facility]   New Visit Progress Note   Recent Hospital Discharge     PRESENTING HISTORY     Chief Complaint/Reason for Admission:  Follow up Hospital Discharge   PCP: Tracy, Primary Doctor    History of Present Illness:  Mr. Bhupinder Garces is a 64 y.o. male who was recently admitted to the hospital.    [facility] Medicine  Discharge Summary        Patient Name: Bhupinder Garces  MRN: [id number]  WILLIE: [id number]  Patient Class: OP- Observation  Admission Date: 7/23/2025  Hospital Length of Stay: 0 days  Discharge Date and Time: 07/24/2025 2:33 PM  Attending Physician: Yesica Leigh MD   Discharging Provider: Yesica Leigh MD  Primary Care Provider: Tracy Primary Doctor  ___________________________________________________________________    Today:  [facility] for [facility] follow up.  Recently hospitalized for management of NSTEMI.  Admitted to [facility] Medicine service with Cardiology consultation.  ACS protocol initiated.   [facility] 7/24/25-> non obstructive CAD.   Medical management maximized.   Patient discharged to home.     Patient accompanied today by family.  Ambulatory and independent with ADL's.   Brought medication bottles for review.   Misunderstood hospital discharge instructions regarding medications.  Only taking Aspirin and Atorvastatin- he self discontinued all other medications.     Review of Systems  General ROS: negative for chills, fever or weight loss  Psychological ROS: negative for hallucination, depression or suicidal ideation  Ophthalmic ROS: negative for blurry vision, photophobia or eye pain  ENT ROS: negative for epistaxis, sore throat or rhinorrhea  + snoring   Respiratory ROS: no cough, shortness of breath, or wheezing  Cardiovascular ROS: no chest pain or dyspnea on exertion  Gastrointestinal ROS: no abdominal pain, change in bowel habits, or black/ bloody stools  Genito-Urinary ROS: no dysuria, trouble voiding, or  "hematuria  Musculoskeletal ROS: negative for gait disturbance or muscular weakness  Neurological ROS: no syncope or seizures; no ataxia  Dermatological ROS: negative for pruritis, rash and jaundice      PAST HISTORY:     Past Medical History:   Diagnosis Date    Prostatitis        Past Surgical History:   Procedure Laterality Date    COLONOSCOPY N/A 5/7/2019    Procedure: COLONOSCOPY possible RBL ;  Surgeon: Fermin Wade MD;  Location: [address];  Service: Colon and Rectal;  Laterality: N/A;    LEFT HEART CATHETERIZATION Left 7/24/2025    Procedure: Left heart cath;  Surgeon: Humble De La Torre MD;  Location: [facility] CATH LAB/EP;  Service: Cardiology;  Laterality: Left;       No family history on file.      MEDICATIONS & ALLERGIES:     Medications Ordered Prior to Encounter[1]     Review of patient's allergies indicates:  No Known Allergies    OBJECTIVE:     Vital Signs:  BP (!) 159/98 (BP Location: Left arm, Patient Position: Sitting)   Pulse 72   Temp 97.8 °F (36.6 °C) (Oral)   Ht 6' 1" (1.854 m)   Wt 125.4 kg (276 lb 7.3 oz)   SpO2 97%   BMI 36.47 kg/m²   Wt Readings from Last 3 Encounters:   07/29/25 0831 125.4 kg (276 lb 7.3 oz)   07/24/25 0800 122.5 kg (270 lb)   07/23/25 1931 122.5 kg (270 lb)   06/19/25 1316 125.8 kg (277 lb 5.4 oz)     Body mass index is 36.47 kg/m².        Physical Exam:  BP (!) 159/98 (BP Location: Left arm, Patient Position: Sitting)   Pulse 72   Temp 97.8 °F (36.6 °C) (Oral)   Ht 6' 1" (1.854 m)   Wt 125.4 kg (276 lb 7.3 oz)   SpO2 97%   BMI 36.47 kg/m²   General appearance: alert, cooperative, no distress  Constitutional:Oriented to person, place, and time  + obese    HEENT: Normocephalic, atraumatic, neck symmetrical, no nasal discharge   Eyes: conjunctivae/corneas clear, PERRL, EOM's intact  Lungs: clear to auscultation bilaterally, no dullness to percussion bilaterally  Heart: regular rate and rhythm without rub; no displacement of the PMI   Abdomen: soft, " non-tender; bowel sounds normoactive; no organomegaly  Extremities: extremities symmetric; no clubbing, cyanosis, or edema  Integument: Skin color, texture, turgor normal; no rashes; hair distrubution normal  Neurologic: Alert and oriented X 3, normal strength, normal coordination and gait  Psychiatric: no pressured speech; normal affect; no evidence of impaired cognition     Laboratory  Lab Results   Component Value Date    WBC 4.59 07/24/2025    HGB 12.1 (L) 07/24/2025    HCT 37.7 (L) 07/24/2025    MCV 88 07/24/2025     07/24/2025     BMP  Lab Results   Component Value Date     (L) 07/24/2025    K 4.0 07/24/2025     07/24/2025    CO2 24 07/24/2025    BUN 14 07/24/2025    CREATININE 1.1 07/24/2025    CALCIUM 8.8 07/24/2025    ANIONGAP 6 (L) 07/24/2025    EGFRNORACEVR >60 07/24/2025     Lab Results   Component Value Date    ALT 23 07/24/2025    AST 31 07/24/2025    ALKPHOS 49 07/24/2025    BILITOT 1.1 (H) 07/24/2025     Lab Results   Component Value Date    INR 1.0 08/05/2022     Lab Results   Component Value Date    HGBA1C 6.2 (H) 12/13/2024       Diagnostic Results:    [facility] 7/24/25:  Findings:   LM: Large, long caliber vessel.  No obstructive coronary artery disease. Trifurcates into LAD, Ramus and LCx   LAD: Large caliber vessel. mLAD with a 30% lesion and minimal luminal irregularities throughout the vessel. No obstructive coronary artery disease. LAD wraps around the apex. MISAEL 2 flow  Ramus:  Large caliber vessel. No obstructive disease.  MISAEL 2 flow  LCx: Large caliber, tortuous vessel.  Gives rise to a medium caliber OM1 vessel. No obstructive disease. MISAEL 2 flow  RCA:  Dominant, large caliber vessel.  Gives rise to PDA and PLV branches.  No obstructive disease. MISAEL 2 flow  LVEDP:  8 mmHg     Recommendations:   Post procedural protocol.   Nonobstructive coronary artery disease  Continue aspirin, high-intensity statin  Aggressive cardiovascular risk factor modification with weight loss,  blood pressure control, dietary modifications    TTE 7/24/25:     Left Ventricle: The left ventricle is normal in size. Normal wall thickness. Regional wall motion abnormalities present. See diagram for wall motion findings. There is low normal systolic function with a visually estimated ejection fraction of 50 - 55%. Quantitated ejection fraction is 53%. Grade I diastolic dysfunction.    Right Ventricle: The right ventricle is mildly dilated measuring 4.2 cm. Systolic function is normal.    Aorta: The aortic root is normal in size measuring 3.9 cm. The proximal ascending aorta is mildly dilated measuring 4.1 cm.    Pulmonary Artery: Pulmonary artery pressure could not be estimated.    Normal valvular structure and function.      TRANSITION OF CARE:     [facility] Contact Note: 7/28/25    Family and/or Caretaker present at visit?  Yes.  Diagnostic tests reviewed/disposition: I have reviewed all completed as well as pending diagnostic tests at the time of discharge.  Disease/illness education: Yes  Home health/community services discussion/referrals: Patient does not have home health established from hospital visit.  They do not need home health.  If needed, we will set up home health for the patient.   Establishment or re-establishment of referral orders for community resources: No other necessary community resources.   Discussion with other health care providers: No discussion with other health care providers necessary.     ASSESSMENT & PLAN:     NSTEMI (non-ST elevated myocardial infarction)  - recent hospitalization as above  - [facility] 7/24/25-> non obstructive CAD; risk factor modification recommended   -     aspirin 81 MG Chew; Take 1 tablet (81 mg total) by mouth once daily.  Dispense: 30 tablet; Refill: 11  -     atorvastatin (LIPITOR) 40 MG tablet; Take 1 tablet (40 mg total) by mouth once daily.  Dispense: 90 tablet; Refill: 3    Primary hypertension  - restart Nifedipine     Elevated glucose  -      Hemoglobin A1C; Future; Expected date: 07/29/2025    Benign prostatic hyperplasia with urinary frequency  - previous provider no longer available, needs to establish new Urology care- see Urology 8/5/25   - restart Flomax   -     Ambulatory referral/consult to Urology; Future; Expected date: 08/05/2025  -     tamsulosin (FLOMAX) 0.4 mg Cap; Take 1 capsule (0.4 mg total) by mouth every evening.  Dispense: 90 capsule; Refill: 3    Sleep apnea  - snores, concern for sleep apnea  - see sleep medicine team (virtual 8/1/25)     Patient will be released from hospital follow up clinic.  See Dr Curtis 9/4/25 to establish primary care.  Additional follow up as detailed below.     Instructions for the patient:      Scheduled Follow-up :  Future Appointments   Date Time Provider Department Center   7/29/2025  9:30 AM APPOINTMENT LAB, MIHAI LERNER [facility] LAB [facility]   8/1/2025 10:00 AM Omar Alvarez, [facility] SLEEPC [facility]   8/5/2025  9:00 AM Kassie Virgen FNP [facility] UROLOGY [facility]   9/4/2025  9:00 AM Kamran Curtis MD [facility] MED [facility]       Post Visit Medication List:     Medication List            Accurate as of July 29, 2025  9:10 AM. If you have any questions, ask your nurse or doctor.                START taking these medications      tamsulosin 0.4 mg Cap  Commonly known as: FLOMAX  Take 1 capsule (0.4 mg total) by mouth every evening.  Started by: Elzbieta Guevara MD            CONTINUE taking these medications      aspirin 81 MG Chew  Take 1 tablet (81 mg total) by mouth once daily.     atorvastatin 40 MG tablet  Commonly known as: LIPITOR  Take 1 tablet (40 mg total) by mouth once daily.     clotrimazole-betamethasone 1-0.05% cream  Commonly known as: LOTRISONE     NIFEdipine 90 MG (OSM) 24 hr tablet  Commonly known as: PROCARDIA-XL  Take 1 tablet (90 mg total) by mouth once daily.     valACYclovir 1000 MG tablet  Commonly known as: VALTREX  Take 1 tablet (1,000 mg total) by mouth  once daily.            STOP taking these medications      NEXLETOL 180 mg Tab  Generic drug: bempedoic acid  Stopped by: Elzbieta Guevara MD               Where to Get Your Medications        These medications were sent to [id number] - [place], [place] - [address] [facility] AT [facility] [address]  [address] [facility], [facility] [address]      Phone: [phone]   aspirin 81 MG Chew  atorvastatin 40 MG tablet  tamsulosin 0.4 mg Cap         Signing Physician:  Elzbieta Guevara MD         [1]   Current Outpatient Medications on File Prior to Visit   Medication Sig Dispense Refill    aspirin 81 MG Chew Take 1 tablet (81 mg total) by mouth once daily. 30 tablet 11    atorvastatin (LIPITOR) 40 MG tablet Take 1 tablet (40 mg total) by mouth once daily. 90 tablet 3    bempedoic acid 180 mg Tab Take 1 tablet (180 mg total) by mouth once daily. (Patient not taking: Reported on 7/28/2025) 30 tablet 11    clotrimazole-betamethasone 1-0.05% (LOTRISONE) cream Apply topically 2 (two) times daily as needed.      NIFEdipine (PROCARDIA-XL) 90 MG (OSM) 24 hr tablet Take 1 tablet (90 mg total) by mouth once daily. 30 tablet 11    valACYclovir (VALTREX) 1000 MG tablet Take 1 tablet (1,000 mg total) by mouth once daily. (Patient taking differently: Take 1,000 mg by mouth as needed.) 90 tablet 3     No current facility-administered medications on file prior to visit.

## 2025-07-29 ENCOUNTER — LAB VISIT (OUTPATIENT)
Dept: LAB | Facility: HOSPITAL | Age: 65
End: 2025-07-29
Attending: INTERNAL MEDICINE
Payer: MEDICARE

## 2025-07-29 ENCOUNTER — OFFICE VISIT (OUTPATIENT)
Dept: PRIMARY CARE CLINIC | Facility: CLINIC | Age: 65
End: 2025-07-29
Payer: MEDICARE

## 2025-07-29 VITALS
SYSTOLIC BLOOD PRESSURE: 159 MMHG | HEIGHT: 73 IN | TEMPERATURE: 98 F | BODY MASS INDEX: 36.64 KG/M2 | DIASTOLIC BLOOD PRESSURE: 98 MMHG | WEIGHT: 276.44 LBS | HEART RATE: 72 BPM | OXYGEN SATURATION: 97 %

## 2025-07-29 DIAGNOSIS — R73.09 ELEVATED GLUCOSE: ICD-10-CM

## 2025-07-29 DIAGNOSIS — I10 PRIMARY HYPERTENSION: ICD-10-CM

## 2025-07-29 DIAGNOSIS — N40.1 BENIGN PROSTATIC HYPERPLASIA WITH URINARY FREQUENCY: ICD-10-CM

## 2025-07-29 DIAGNOSIS — I21.4 NSTEMI (NON-ST ELEVATED MYOCARDIAL INFARCTION): Primary | ICD-10-CM

## 2025-07-29 DIAGNOSIS — R35.0 BENIGN PROSTATIC HYPERPLASIA WITH URINARY FREQUENCY: ICD-10-CM

## 2025-07-29 DIAGNOSIS — G47.33 OBSTRUCTIVE SLEEP APNEA SYNDROME: ICD-10-CM

## 2025-07-29 LAB
EAG (OHS): 134 MG/DL (ref 68–131)
HBA1C MFR BLD: 6.3 % (ref 4–5.6)

## 2025-07-29 PROCEDURE — 83036 HEMOGLOBIN GLYCOSYLATED A1C: CPT

## 2025-07-29 PROCEDURE — 36415 COLL VENOUS BLD VENIPUNCTURE: CPT

## 2025-07-29 PROCEDURE — 99213 OFFICE O/P EST LOW 20 MIN: CPT | Mod: PBBFAC,PO | Performed by: INTERNAL MEDICINE

## 2025-07-29 PROCEDURE — 99999 PR PBB SHADOW E&M-EST. PATIENT-LVL III: CPT | Mod: PBBFAC,,, | Performed by: INTERNAL MEDICINE

## 2025-07-29 RX ORDER — ATORVASTATIN CALCIUM 40 MG/1
40 TABLET, FILM COATED ORAL DAILY
Qty: 90 TABLET | Refills: 3 | Status: SHIPPED | OUTPATIENT
Start: 2025-07-29 | End: 2026-07-29

## 2025-07-29 RX ORDER — TAMSULOSIN HYDROCHLORIDE 0.4 MG/1
0.4 CAPSULE ORAL NIGHTLY
Qty: 90 CAPSULE | Refills: 3 | Status: SHIPPED | OUTPATIENT
Start: 2025-07-29 | End: 2026-07-29

## 2025-07-29 RX ORDER — NAPROXEN SODIUM 220 MG/1
81 TABLET, FILM COATED ORAL DAILY
Qty: 30 TABLET | Refills: 11 | Status: SHIPPED | OUTPATIENT
Start: 2025-07-29 | End: 2026-07-29

## 2025-08-01 ENCOUNTER — OFFICE VISIT (OUTPATIENT)
Facility: CLINIC | Age: 65
End: 2025-08-01
Payer: MEDICARE

## 2025-08-01 DIAGNOSIS — G47.00 INSOMNIA, UNSPECIFIED TYPE: ICD-10-CM

## 2025-08-01 DIAGNOSIS — G47.30 SLEEP APNEA, UNSPECIFIED TYPE: Primary | ICD-10-CM

## 2025-08-01 DIAGNOSIS — E66.812 CLASS 2 SEVERE OBESITY WITH SERIOUS COMORBIDITY AND BODY MASS INDEX (BMI) OF 36.0 TO 36.9 IN ADULT, UNSPECIFIED OBESITY TYPE: ICD-10-CM

## 2025-08-01 DIAGNOSIS — E66.01 CLASS 2 SEVERE OBESITY WITH SERIOUS COMORBIDITY AND BODY MASS INDEX (BMI) OF 36.0 TO 36.9 IN ADULT, UNSPECIFIED OBESITY TYPE: ICD-10-CM

## 2025-08-01 NOTE — PATIENT INSTRUCTIONS
SLEEP LAB (Mary or Ivan) will contact you to schedule the sleep study. Their number is [phone] (ext [phone]). Please call them if you do not hear from them in 2 weeks from now.  The [facility], [place], operated by [facility] is located on 7th floor of the [facility]; [facility] is located in [facility] ( 3rd floor [facility]).    SLEEP CLINIC (my assistant) will call you when the sleep study results are ready or I will message you through the portal with the results as we have discussed - if you have not heard from us by 2 weeks from the date of the study, or you can use My [facility] to contact me.    Our clinic phone number is [phone] (ext 1)       You are advised to abstain from driving should you feel sleepy or drowsy.      Sleep Hygiene Tips for Insomnia  If you're struggling with falling asleep or staying asleep, the following habits can help retrain your brain and body for better rest. These tips are especially important for managing insomnia:    1. Wake up at the same time every day  Even if you didnt sleep well the night before, set your alarm and get up at the same time every morning. This strengthens your bodys internal sleep rhythm.    2. Go to bed only when sleepy  Dont force yourself to sleep. If youre not sleepy, do a quiet activity outside of bed (like reading) until you feel drowsy, then try again.    3. Get out of bed if you cant sleep  If youre awake in bed for more than 20 minutes, get up and do a calming, non-stimulating activity until you feel sleepy. Avoid screens during this time.    4. Reserve your bed for sleep only  Dont read, watch TV, eat, or use your phone in bed. This helps your brain link your bed with sleep--not with staying awake.    5. Limit naps during the day  If you must nap, keep it under 30 minutes and avoid napping after 2 PM. Long or late naps can make it harder to fall asleep at night.    6. Reduce screen time before bed  Blue light from phones,  tablets, and TVs can interfere with melatonin, your bodys natural sleep hormone. Aim to turn off screens 60 minutes before bed.    7. Create a calming bedtime routine  Start winding down at least 30 minutes before sleep. Activities like gentle stretching, reading, or listening to calm music can help your body transition to sleep.    8. Avoid caffeine after noon  Caffeine (found in coffee, tea, soda, and chocolate) can stay in your system for hours and worsen insomnia.    9. Avoid alcohol close to bedtime  Alcohol may help you fall asleep initially but disrupts deep sleep and causes nighttime awakenings.    10. Keep your bedroom cool, dark, and quiet  Use blackout curtains, white noise machines, or fans as needed. A comfortable sleep environment can reduce nighttime awakenings.    If insomnia symptoms continue despite these efforts, we can explore additional treatment options, such as Cognitive Behavioral Therapy for Insomnia (CBT-I) or medication if appropriate.     Patient Education: Zepbound (tirzepatide)  For Weight Management in Adults  ?? What is Zepbound?  Zepbound is a once-weekly injectable medication used to help adults with obesity or overweight with weight-related medical problems lose weight.  It works by mimicking two hormones (GLP-1 and GIP) that help regulate appetite, blood sugar, and metabolism.    ?? How to Use It  Inject once weekly, on the same day each week, at any time of day.  Rotate injection sites (abdomen, thigh, or upper arm).  Can be taken with or without food.    ?? Dosing  Typically starts low and increases gradually to reduce side effects:  Starting dose: 2.5 mg weekly  May increase every 4 weeks as tolerated, up to 15 mg  Your provider will adjust your dose based on how your body responds.    ?? Common Side Effects  Nausea (most common)  Vomiting, diarrhea, constipation  Fatigue or decreased appetite  Indigestion or burping  ?? Tip: Eat smaller meals, avoid greasy or rich foods, and  stay hydrated.    ?? Serious (but rare) Side Effects  Call your provider if you notice:  Severe stomach pain (may indicate pancreatitis)  Signs of an allergic reaction (swelling, rash, difficulty breathing)  Vision changes (especially if you have diabetes)  Lump in the neck (thyroid nodule - report any new symptoms)    ?? Important Warnings  Do not use if you or a family member has a history of medullary thyroid cancer or MEN2 syndrome  Not for use in pregnancy or while breastfeeding  May cause low blood sugar if used with insulin or sulfonylureas    ? Lifestyle Matters  Zepbound works best when combined with:  A reduced-calorie, nutrient-dense diet  Regular physical activity  think of it as a tool to support lifestyle changes

## 2025-08-01 NOTE — PROGRESS NOTES
The patient location is: [place]  The chief complaint leading to consultation is: Snoring, Insomnia, and Apnea    Visit type: audiovisual    Face to Face time with patient: 16  25 minutes of total time spent on the encounter, which includes face to face time and non-face to face time preparing to see the patient (eg, review of tests), Obtaining and/or reviewing separately obtained history, Documenting clinical information in the electronic or other health record, Independently interpreting results (not separately reported) and communicating results to the patient/family/caregiver, or Care coordination (not separately reported).     Each patient to whom he or she provides medical services by telemedicine is:  (1) informed of the relationship between the physician and patient and the respective role of any other health care provider with respect to management of the patient; and (2) notified that he or she may decline to receive medical services by telemedicine and may withdraw from such care at any time.    Notes:     CC: Snoring, Insomnia, and Apnea     Referred by No ref. provider found for a sleep evaluation.     HPI     Subjective    HPI:  Sleep Apnea:  Symptoms:    [x] Loud snoring[x] Witnessed apneas [x] Gasping [x] Choking[x] Interrupted Sleep [x]  Nocturia [x]  Non refreshing sleep [x] Excessive daytime sleepiness   [x]  Morning headaches [x] Nasal Congestion [x] Dry Mouth [] Excessive Daytime Fatigue [] None  Duration:   [] Days  [] Months  [x] Years  Comments: Patient presents for possible sleep apnea and insomnia, previously diagnosed with Mild SHRAVAN in the 80s, pursued weight loss as a treatment option, however presents with worsening symptoms seen above. PMH signifcant for HTN, HLD< NSTEMI, Obesity.     Wake up Feeling: [] Refreshed  [x] Non refreshed [] Occasionally Tired  Do You Take Naps: [x] Yes [] No Number of Naps:  occasional      Insomnia  Symptoms: [] Difficulty Falling Asleep [x] Difficulty  Staying Asleep [x] Frequent Nocturnal Awakenings [x] Poor Sleep Quality [x] Non-Restorative Sleep [] Early Morning Awakening  Current Episode Severity: [] Mild [x] Moderate [] Severe  Treatments: [] Medications []  Sleep Hygiene & Stimulus Control []  Sleep Restriction []  CBTI [] Relaxation Techniques [x]  None  Treatment Effects: [] Decreased Sleep Latency [] Sleep Consolidation [] Improved Sleep Quality []  Medication Side Effects [x] No Improvement  Insomnia: [] Improving [] Worsening [x] Unchanged [] Stable  Comments: reports difficulty staying asleep. Contributing factors include possible untreated sleep apnea, BPH contributing to nocturia, and poor sleep hygiene. Sleeps with the tv on. Takes naps occasionally. No current treatments. No medications tried in the past.     Bedtime: 9-10 pm  Sleep Latency: minutes  Nightime Awakenings: 5-6  Nocturia: 5-6  Mornin:30 am          2025    10:24 AM   EPWORTH SLEEPINESS SCALE TOTAL SCORE    Total score 19        Patient-reported     (Validated Sleepiness Questionnaire with higher score indicating greater sleepiness (0-24)    STOP BANG Questionnaire  Patient diagnosed with Obstructive Sleep Apnea?: No  Has loud snoring: Yes  Disturbed sleep, daytime fatigue, daytime somnolence: Yes  Observed to have interrupted breathing during sleep: Yes  Takes medication for high blood pressure: Yes  Not taking BP medication but supposed to be: No  Recent BMI (Calculated): 36.5  Is BMI greater than 35 kg/m2?: 1=Yes  Age older than 50 years old?: 1=Yes  Has large neck size >40cm (15.7in., large male shirt size, large male collar size >16): No  Gender - Male: 1=Yes  STOP-Bang Total Score: 7  (Validated Stop Bang Questionnaire with higher score indicating greater risk of SHRAVAN (0-2, 3-4, 5-8)) Risk: High         No data to display                Current Sleep Medication(s): None     Previous Sleep Medication(s): None     Sleep Factors:  Sleep Environment: Cool, Dark, Comfortable,  and TV on while asleep  Caffeine: 1 beverages, last beverage at  am  Bed Partner: spouse, living together  Alcohol: none  Sleep Disorder Family History: None          Objective    Records Reviewed:   Lab Results   Component Value Date    TSH 1.838 07/23/2025    CO2 24 07/24/2025    HGBA1C 6.3 (H) 07/29/2025      Echo  Result Date: 7/24/2025    Left Ventricle: The left ventricle is normal in size. Normal wall   thickness. Regional wall motion abnormalities present. See diagram for   wall motion findings. There is low normal systolic function with a   visually estimated ejection fraction of 50 - 55%. Quantitated ejection   fraction is 53%. Grade I diastolic dysfunction.    Right Ventricle: The right ventricle is mildly dilated measuring 4.2   cm. Systolic function is normal.    Aorta: The aortic root is normal in size measuring 3.9 cm. The proximal   ascending aorta is mildly dilated measuring 4.1 cm.    Pulmonary Artery: Pulmonary artery pressure could not be estimated.    Normal valvular structure and function.      Sleep Studies : None    Objective:  Vitals: There were no vitals taken for this visit.   Exam:  Gen: Well Appearing, demonstrates insight  Skin: No rash or lesions on bridge of nose or mouth          Assessment & Plan  Sleep apnea, unspecified type  Diagnostic Testing:  Polysomnogram (PSG) is indicated due to comorbid conditions: CHF EF (50-55%), CAD, Hypertension, and Obesity with symptoms: snoring, gasping for air, choking , witnessed apneas, interrupted sleep, nocturia, restless sleep, non refreshing sleep, and excessive daytime sleepiness, ESS: 19/24  Education & Treatment Options:  Discussed the etiology and consequences of untreated SHRAVAN, including risks of cardiovascular disease, hypertension, stroke, metabolic dysfunction, and excessive daytime sleepiness.  Reviewed treatment options:  Positive Airway Pressure (PAP) therapy, Weight loss Positional therapy, Oral appliance therapy,  "Inspire  Lifestyle Modifications: Advised weight management, alcohol reduction, and optimizing sleep hygiene.  Safety Precautions: Recommended avoiding driving if experiencing excessive daytime sleepiness.  Next Steps:  If SHRAVAN is confirmed, patient agrees to trial APAP  If PAP therapy is initiated, follow-up within 31-90 days to assess compliance and effectiveness.  Results will be communicated by Mychart   Orders:    Polysomnogram (CPAP will be added if patient meets diagnostic criteria.); Future    Class 2 severe obesity with serious comorbidity and body mass index (BMI) of 36.0 to 36.9 in adult, unspecified obesity type  Discussed Zepbound as a treatment option for SHRAVAN and Obesity.   Discussed dosing, administration, common side effects, follow up.  Recommended small meals and staying hydrated with zepbound use  Educated on signs and symptoms of pancreatitis      Contraindications Screened:   [x]  No personal or family history of medullary thyroid carcinoma (MTC) or MEN2   [x]  No history of pancreatitis   [x]  No significant gastroparesis or GI motility disorders    [x]  No known hypersensitivity to tirzepatide or its excipients      Estimated body mass index is 36.47 kg/m² as calculated from the following:    Height as of 7/29/25: 6' 1" (1.854 m).    Weight as of 7/29/25: 125.4 kg (276 lb 7.3 oz).    For Adults 20 Years and Older:    BMI Weight Status   Below 18.5 Underweight   18.6-24.9 Normal/Healthy   25.0-29.9 Overweight   30.0 & Above Obese     Ideal Weight Range for Your Height: 6'1" = 144 - 188 lbs    Wt Readings from Last 3 Encounters:   07/29/25 125.4 kg (276 lb 7.3 oz)   07/24/25 122.5 kg (270 lb)   06/19/25 125.8 kg (277 lb 5.4 oz)                Insomnia, unspecified type  Discussed sleep hygiene measures including regular sleep schedule, optimal sleep environment, and relaxing presleep rituals.  Avoid daytime naps.  Recommended daily exercise.  Educational materials distributed.        "

## 2025-08-04 ENCOUNTER — TELEPHONE (OUTPATIENT)
Dept: SLEEP MEDICINE | Facility: OTHER | Age: 65
End: 2025-08-04
Payer: MEDICARE

## 2025-08-05 ENCOUNTER — OFFICE VISIT (OUTPATIENT)
Dept: UROLOGY | Facility: CLINIC | Age: 65
End: 2025-08-05
Payer: MEDICARE

## 2025-08-05 VITALS
SYSTOLIC BLOOD PRESSURE: 128 MMHG | HEIGHT: 73 IN | BODY MASS INDEX: 36.8 KG/M2 | WEIGHT: 277.69 LBS | HEART RATE: 98 BPM | DIASTOLIC BLOOD PRESSURE: 77 MMHG

## 2025-08-05 DIAGNOSIS — N40.1 BENIGN PROSTATIC HYPERPLASIA WITH URINARY FREQUENCY: ICD-10-CM

## 2025-08-05 DIAGNOSIS — G89.29 CHRONIC BACK PAIN, UNSPECIFIED BACK LOCATION, UNSPECIFIED BACK PAIN LATERALITY: Primary | ICD-10-CM

## 2025-08-05 DIAGNOSIS — M54.9 CHRONIC BACK PAIN, UNSPECIFIED BACK LOCATION, UNSPECIFIED BACK PAIN LATERALITY: Primary | ICD-10-CM

## 2025-08-05 DIAGNOSIS — R35.0 BENIGN PROSTATIC HYPERPLASIA WITH URINARY FREQUENCY: ICD-10-CM

## 2025-08-05 DIAGNOSIS — R35.1 NOCTURIA: ICD-10-CM

## 2025-08-05 LAB — POC RESIDUAL URINE VOLUME: 24 ML (ref 0–100)

## 2025-08-05 PROCEDURE — 99999 PR PBB SHADOW E&M-EST. PATIENT-LVL IV: CPT | Mod: PBBFAC,,,

## 2025-08-05 PROCEDURE — 99999PBSHW POCT BLADDER SCAN: Mod: PBBFAC,,,

## 2025-08-05 PROCEDURE — 99214 OFFICE O/P EST MOD 30 MIN: CPT | Mod: S$PBB,,,

## 2025-08-05 PROCEDURE — 51798 US URINE CAPACITY MEASURE: CPT | Mod: PBBFAC,PO

## 2025-08-05 PROCEDURE — 99214 OFFICE O/P EST MOD 30 MIN: CPT | Mod: PBBFAC,PO,25

## 2025-08-05 NOTE — PATIENT INSTRUCTIONS
Continue to take flomax 0.4mg daily  Avoid bladder irritants including but not limited to caffeine, alcohol, smoking, spicy foods, acidic foods, tomato-based products, citrus, artificial sweeteners, chocolate, coffee or tea.    Drink 1-2 liters of water daily.  Elevate legs night for an hour prior to bed, void prior to going to bed.  Stop drinking fluids 2 hours before bedtime.

## 2025-08-05 NOTE — PROGRESS NOTES
Subjective:       Patient ID: Bhupinder Garces is a 64 y.o. male.    Chief Complaint: Benign Prostatic Hypertrophy and Urinary Frequency     This is a 64 y.o.  male patient that is new to me but not new to the system.  The patient was referred to me by Dr. Guevara for BPH w/ urinary frequency.  This patient has seen Poly King NP in the past at [facility]. He has history of BPH w/ LUTS, taking flomax.  Patient was recently hospitalized for NSTEMI 7/23/25. Here today to establish care with new urologist. Reports urinary frequency, nocturia x2-5. Denies straining or hesitancy since starting flomax. Denies incontinence. Daily fluids consist of water, sometimes tea. He stops fluids 2 hours before bed. He does snore and has a sleep study coming up.   Denies ever having an elevated PSA, prostate work-up or outlet procedure.  Reports lower back pain that has been present for years. The pain is worse with activity, and sometimes radiates to groin area.  PVR 24ml immediately after urinating in clinic  Urine dipstick- negative for Leukocytes, RBC, and Nitrites.     LAST PSA  Lab Results   Component Value Date    PSA 1.09 05/22/2025    PSA 0.40 03/22/2011    PSA 0.4 10/01/2007    PSA 0.4 12/06/2005    PSADIAG 0.95 08/21/2024    PSADIAG 0.70 08/22/2023    PSADIAG 0.93 11/03/2021    PSADIAG 0.83 03/23/2021    PSADIAG 0.59 01/08/2020    PSADIAG 0.72 06/05/2018    PSADIAG 0.56 03/03/2017    PSADIAG 0.62 02/05/2015    PSATOTAL 0.3 04/12/2004    PSAFREE 0.18 04/12/2004       Lab Results   Component Value Date    CREATININE 1.1 07/24/2025       ---  PMH/PSH/Medications/Allergies/Social history reviewed and as in chart.    Review of Systems   Constitutional:  Negative for activity change, chills and fever.   Respiratory:  Negative for shortness of breath.    Cardiovascular:  Negative for chest pain and palpitations.   Gastrointestinal:  Negative for abdominal pain and constipation.   Genitourinary:  Positive for frequency. Negative  for difficulty urinating, dysuria, flank pain, hematuria and urgency.   Musculoskeletal:  Positive for back pain.   Neurological:  Negative for dizziness and light-headedness.     Objective:      Physical Exam  HENT:      Head: Normocephalic.   Pulmonary:      Effort: Pulmonary effort is normal.   Musculoskeletal:         General: Normal range of motion.      Cervical back: Normal range of motion.   Skin:     General: Skin is warm and dry.   Neurological:      Mental Status: He is alert and oriented to person, place, and time.       Assessment:     Problem Noted   Chronic Back Pain 8/5/2025   Nocturia 8/5/2025   Benign Prostatic Hyperplasia With Urinary Frequency 6/5/2018       Plan:     Discussed adding a beta 3 agonist to help with frequency and nocturia, patient deferred for now.  Advised to get sleep study as this could help with nocturia  Continue flomax daily  Avoid bladder irritants including but not limited to caffeine, alcohol, smoking, spicy foods, acidic foods, tomato-based products, citrus, artificial sweeteners, chocolate, coffee or tea.  Drink 1-2 liters of water daily  Advised patient that back pain is unlikely to be urology related and to discuss this with PCP. Intermittent groin pain is likely from the chronic back pain.  Follow-up 6 months or sooner if needed      JACOBO Villanueva    I spent a total of 30 minutes on the day of the visit.This includes face to face time and non-face to face time preparing to see the patient (eg, review of tests), obtaining and/or reviewing separately obtained history, documenting clinical information in the electronic or other health record, independently interpreting results and communicating results to the patient/family/caregiver, or care coordinator.

## 2025-08-27 ENCOUNTER — HOSPITAL ENCOUNTER (OUTPATIENT)
Dept: SLEEP MEDICINE | Facility: HOSPITAL | Age: 65
Discharge: HOME | End: 2025-08-27
Payer: MEDICARE

## 2025-08-27 DIAGNOSIS — G47.30 SLEEP APNEA, UNSPECIFIED TYPE: ICD-10-CM

## 2025-08-27 PROCEDURE — 95811 POLYSOM 6/>YRS CPAP 4/> PARM: CPT

## 2025-08-28 PROBLEM — G47.30 SLEEP APNEA: Status: ACTIVE | Noted: 2025-07-29

## (undated) DEVICE — PAD DEFIB CADENCE ADULT R2

## (undated) DEVICE — PADS RADI PERIPHERAL SHIELD

## (undated) DEVICE — COVER PROBE US 5.5X58L NON LTX

## (undated) DEVICE — CATH OPTITORQUE TIGER 5F 100CM

## (undated) DEVICE — KIT LEFT HEART MANIFOLD CUSTOM

## (undated) DEVICE — GUIDEWIRE EMERALD .035IN 260CM

## (undated) DEVICE — KIT GLIDESHEATH SLEND 6FR 10CM

## (undated) DEVICE — HEMOSTAT VASC BAND REG 24CM

## (undated) DEVICE — SPIKE SHORT LG BORE 1-WAY 2IN

## (undated) DEVICE — DRAPE ANGIO BRACH 38X44IN

## (undated) DEVICE — CONTRAST VISIPAQUE 150ML

## (undated) DEVICE — Device